# Patient Record
Sex: MALE | Race: WHITE | Employment: OTHER | URBAN - METROPOLITAN AREA
[De-identification: names, ages, dates, MRNs, and addresses within clinical notes are randomized per-mention and may not be internally consistent; named-entity substitution may affect disease eponyms.]

---

## 2017-01-23 RX ORDER — PEN NEEDLE, DIABETIC 31 GX3/16"
1 NEEDLE, DISPOSABLE MISCELLANEOUS DAILY
Qty: 100 PEN NEEDLE | Refills: 3 | Status: SHIPPED | OUTPATIENT
Start: 2017-01-23 | End: 2017-05-18 | Stop reason: SDUPTHER

## 2017-01-24 ENCOUNTER — TELEPHONE (OUTPATIENT)
Dept: ENDOCRINOLOGY | Age: 82
End: 2017-01-24

## 2017-01-24 RX ORDER — WARFARIN SODIUM 4 MG/1
4 TABLET ORAL 2 TIMES DAILY
Qty: 180 TAB | Refills: 0 | Status: SHIPPED | OUTPATIENT
Start: 2017-01-24 | End: 2017-03-01 | Stop reason: SDUPTHER

## 2017-01-24 NOTE — TELEPHONE ENCOUNTER
----- Message from Nayana Workman MD sent at 1/24/2017  4:52 PM EST -----  Regarding: RE: Dr. Claribel Sin / Telephone  Contact: [phone]  This is fine - 90 day supply of branded Coumadin from [facility]. Thank you  ----- Message -----     From: Neel Lozano LPN     Sent: 2/87/8892   4:44 PM       To: Nayana Workman MD  Subject: RE: Dr. Claribel Sin / Telephone                         I called patient back and spoke to him and his wife. Current Coumadin dosing is 4 mg x 2 daily (8 mg total per day). Patient stated it is very important he receive the brand Coumadin and not warfarin. He would really like to fill this through [address] mail order for a 90 day supply, is this ok? Patient states he has been on 8 mg Coumadin for a long time and his INR has been very stable. He will set up appointment with a new PCP as soon as possible.   ----- Message -----     From: Nayana Workman MD     Sent: 1/24/2017   4:27 PM       To: Neel Lozano LPN  Subject: RE: Dr. Claribel Sin / Telephone                         I will be happy to help the [facility] as best as I can, but I have not monitored INR levels or adjusted coumadin in about 10 years, so I would not feel comfortable doing that. I recommend finding a new PCP to help with this. If his INR levels have been stable on current dose of coumadin, then I could send over a 30 day supply with one refill. Please confirm current dosing with them.      ----- Message -----     From: Neel Lozano LPN     Sent: 9/90/2956   3:04 PM       To: Nayana Workman MD  Subject: FW: Dr. Claribel Sin / Telephone                          I returned Mrs. Paiz's call. Patient is changing insurance and his current PCP does not take his new insurance. His PCP has been following his INR every 6-8 weeks and adjusting his coumadin dose accordingly. Patient's wife stated patient would really like for Dr. Claribel Sin to [facility] over\" his coumadin and INR. Currently he is taking coumadin 4 mg x 2 daily.  He is due for an INR on 2/28/16. At the very least, patient would like a one time prescription for coumadin called in to last until he can establish care with a new PCP. Patient's wife states she understands that \"everyone needs a PCP\", but patient wanted her to ask if Dr. Prema Dorsey could take over as much of his care as possible.    ----- Message -----     From: Sandria Moritz     Sent: 1/23/2017   1:59 PM       To: Chai Goff LPN  Subject: FW: Dr. Prema Dorsey / Telephone                         1/23/2017  1:59 PM      I spoke with Mrs. Aura Williamson regarding insurance, please see message below. Thanks  ----- Message -----     From: Lilly Ramos     Sent: 1/23/2017  12:56 PM       To: Tanner Torres [facility]  Subject: Dr. Prema Dorsey / Telephone                             Pt's wife called practice to inform Staff that their insurance has changed as of January 1st.  Pt's wife would like to know if Dr. Prema Dorsey accepts \"[employer] Medicare Advantage PPO\". Please contact Pt. Pt also needs to speak with the nurse regarding his medicine.   Thank you

## 2017-03-01 ENCOUNTER — OFFICE VISIT (OUTPATIENT)
Dept: FAMILY MEDICINE CLINIC | Age: 82
End: 2017-03-01

## 2017-03-01 VITALS
SYSTOLIC BLOOD PRESSURE: 138 MMHG | HEIGHT: 69 IN | DIASTOLIC BLOOD PRESSURE: 68 MMHG | OXYGEN SATURATION: 97 % | WEIGHT: 280 LBS | TEMPERATURE: 96.5 F | HEART RATE: 86 BPM | RESPIRATION RATE: 18 BRPM | BODY MASS INDEX: 41.47 KG/M2

## 2017-03-01 DIAGNOSIS — I10 ESSENTIAL HYPERTENSION: ICD-10-CM

## 2017-03-01 DIAGNOSIS — M17.12 PRIMARY OSTEOARTHRITIS OF LEFT KNEE: ICD-10-CM

## 2017-03-01 DIAGNOSIS — N40.1 BENIGN PROSTATIC HYPERPLASIA WITH LOWER URINARY TRACT SYMPTOMS, UNSPECIFIED MORPHOLOGY: ICD-10-CM

## 2017-03-01 DIAGNOSIS — Z79.01 ANTICOAGULANT LONG-TERM USE: ICD-10-CM

## 2017-03-01 DIAGNOSIS — E11.9 TYPE 2 DIABETES MELLITUS WITHOUT COMPLICATION, WITH LONG-TERM CURRENT USE OF INSULIN (HCC): Primary | ICD-10-CM

## 2017-03-01 DIAGNOSIS — K21.9 GASTROESOPHAGEAL REFLUX DISEASE, ESOPHAGITIS PRESENCE NOT SPECIFIED: ICD-10-CM

## 2017-03-01 DIAGNOSIS — Z86.718 HISTORY OF RECURRENT DEEP VEIN THROMBOSIS (DVT): ICD-10-CM

## 2017-03-01 DIAGNOSIS — Z79.4 TYPE 2 DIABETES MELLITUS WITHOUT COMPLICATION, WITH LONG-TERM CURRENT USE OF INSULIN (HCC): Primary | ICD-10-CM

## 2017-03-01 LAB
INR BLD: 2.4
PT POC: NORMAL SEC
VALID INTERNAL CONTROL?: YES

## 2017-03-01 RX ORDER — TELMISARTAN AND AMLODIPINE 5; 80 MG/1; MG/1
TABLET ORAL
Qty: 90 TAB | Refills: 1 | Status: SHIPPED | OUTPATIENT
Start: 2017-03-01 | End: 2017-06-20 | Stop reason: SDUPTHER

## 2017-03-01 RX ORDER — CHOLECALCIFEROL TAB 125 MCG (5000 UNIT) 125 MCG
1000 TAB ORAL DAILY
COMMUNITY

## 2017-03-01 RX ORDER — WARFARIN SODIUM 4 MG/1
8 TABLET ORAL DAILY
Qty: 180 TAB | Refills: 0 | Status: SHIPPED | OUTPATIENT
Start: 2017-03-01 | End: 2017-06-20 | Stop reason: SDUPTHER

## 2017-03-01 RX ORDER — MELOXICAM 15 MG/1
15 TABLET ORAL
Qty: 30 TAB | Refills: 0 | Status: SHIPPED | OUTPATIENT
Start: 2017-03-01 | End: 2017-03-20 | Stop reason: ALTCHOICE

## 2017-03-01 RX ORDER — ZINC GLUCONATE 50 MG
1000 TABLET ORAL DAILY
COMMUNITY

## 2017-03-01 RX ORDER — FINASTERIDE 5 MG/1
5 TABLET, FILM COATED ORAL DAILY
Qty: 30 TAB | Refills: 5 | Status: SHIPPED | OUTPATIENT
Start: 2017-03-01 | End: 2017-08-20 | Stop reason: SDUPTHER

## 2017-03-01 RX ORDER — CALC/MAG/B COMPLEX/D3/HERB 61
15 TABLET ORAL
Qty: 90 CAP | Refills: 1 | Status: SHIPPED | OUTPATIENT
Start: 2017-03-01 | End: 2017-05-18 | Stop reason: SDUPTHER

## 2017-03-01 RX ORDER — METFORMIN HYDROCHLORIDE 1000 MG/1
1000 TABLET ORAL 2 TIMES DAILY
Qty: 180 TAB | Refills: 1 | Status: SHIPPED | OUTPATIENT
Start: 2017-03-01 | End: 2017-06-20 | Stop reason: SDUPTHER

## 2017-03-01 NOTE — PROGRESS NOTES
Chief Complaint   Patient presents with   [address] patient   Needs refill of pain medication   Regulate Coumadin  Cough since [place]  Room 2

## 2017-03-01 NOTE — MR AVS SNAPSHOT
Visit Information Date & Time Provider Department Dept. Phone Encounter #  
 3/1/2017  3:20 PM Zoe Knox MD [facility] at [address] [id number] Follow-up Instructions Return in about 4 weeks (around 3/29/2017), or if symptoms worsen or fail to improve. Your Appointments 3/15/2017  1:30 PM  
Follow Up with Nellie Rogel MD  
[facility]) Appt Note: f/u              dm                  4 month  
 [address] [facility] [address] [id number] [address]  
  
    
 3/20/2017  2:00 PM  
COMPLETE PHYSICAL with Zoe Knox MD  
[facility] Appt Note: [address] [address]  
[address] [facility] Upcoming Health Maintenance Date Due  
 EYE EXAM RETINAL OR DILATED Q1 8/15/1944 DTaP/Tdap/Td series (1 - Tdap) 8/15/1955 ZOSTER VACCINE AGE 60> 8/15/1994 GLAUCOMA SCREENING Q2Y 8/15/1999 Pneumococcal 65+ Low/Medium Risk (1 of 2 - PCV13) 8/15/1999 MEDICARE YEARLY EXAM 8/15/1999 INFLUENZA AGE 9 TO ADULT 8/1/2016 HEMOGLOBIN A1C Q6M 5/16/2017 FOOT EXAM Q1 7/13/2017 MICROALBUMIN Q1 11/16/2017 LIPID PANEL Q1 11/16/2017 Allergies as of 3/1/2017  Review Complete On: 3/1/2017 By: Zoe Knox MD  
  
 Severity Noted Reaction Type Reactions Statins-hmg-coa Reductase Inhibitors  03/23/2016   Side Effect Myalgia Tried several statins. Current Immunizations  Never Reviewed No immunizations on file. Not reviewed this visit You Were Diagnosed With   
  
 Codes Comments Anticoagulant long-term use    -  Primary ICD-10-CM: Z79.01 
ICD-9-CM: V58.61  History of recurrent deep vein thrombosis (DVT)     ICD-10-CM: E69.453 
 ICD-9-CM: V12.51 Type 2 diabetes mellitus without complication, with long-term current use of insulin ([facility])     ICD-10-CM: E11.9, Z79.4 ICD-9-CM: 250.00, V58.67 Essential hypertension     ICD-10-CM: I10 
ICD-9-CM: 401.9 Gastroesophageal reflux disease, esophagitis presence not specified     ICD-10-CM: K21.9 ICD-9-CM: 530.81 Primary osteoarthritis of left knee     ICD-10-CM: M17.12 
ICD-9-CM: 715.16 Benign prostatic hyperplasia with lower urinary tract symptoms, unspecified morphology     ICD-10-CM: N40.1 ICD-9-CM: 600.01 Vitals BP  
  
  
  
  
  
 138/68 (BP 1 Location: Left arm, BP Patient Position: Sitting) Vitals History BMI and BSA Data Body Mass Index Body Surface Area 41.95 kg/m 2 2.48 m 2 Preferred Pharmacy Pharmacy Name Phone [address], [facility] [phone] Your Updated Medication List  
  
   
This list is accurate as of: 3/1/17  4:34 PM.  Always use your most recent med list.  
  
  
  
  
 COUMADIN 4 mg tablet Generic drug:  warfarin Take 2 Tabs by mouth daily. BRAND MEDICALLY NECESSARY exenatide microspheres 2 mg/0.65 mL Pnij Commonly known as:  BYDUREON  
2 mg by SubCUTAneous route every seven (7) days. For diabetes  
  
 finasteride 5 mg tablet Commonly known as:  PROSCAR Take 1 Tab by mouth daily. Indications: SYMPTOMATIC BENIGN PROSTATIC HYPERPLASIA FLEXERIL 10 mg tablet Generic drug:  cyclobenzaprine Take  by mouth three (3) times daily as needed for Muscle Spasm(s). insulin glargine 100 unit/mL (3 mL) pen Commonly known as:  LANTUS SOLOSTAR  
55 Units by SubCUTAneous route daily. Insulin Needles (Disposable) 31 gauge x 3/16\" Ndle Commonly known as:  BD INSULIN PEN NEEDLE UF MINI  
1 each by SubCUTAneous route daily. Insulin Needles (Disposable) 32 gauge x 5/32\" Ndle Commonly known as:  Linda Pen Needle 1 Each by SubCUTAneous route daily. lansoprazole 15 mg capsule Commonly known as:  PREVACID Take 1 Cap by mouth Daily (before breakfast). Lower dose  
  
 magnesium oxide 400 mg tablet Commonly known as:  MAG-OX Take 1 Tab by mouth four (4) times daily. For low magnesium  
  
 meloxicam 15 mg tablet Commonly known as:  MOBIC Take 1 Tab by mouth daily (after breakfast). Take for 3 days during flares  
  
 metFORMIN 1,000 mg tablet Commonly known as:  GLUCOPHAGE Take 1 Tab by mouth two (2) times a day. Omega-3 Fatty Acids 60- mg Cpdr  
Take  by mouth. Gummies OTHER Take 150 mg by mouth two (2) times a day. Vitacost Benfotiamine-Take 2 tab BID  
  
 quiNINE 324 mg capsule Take 324 mg by mouth nightly as needed. tadalafil 5 mg tablet Commonly known as:  CIALIS  
TAKE 1 TABLET DAILY Telmisartan-amLODIPine 80-5 mg Tab TAKE 1 TABLET NIGHTLY FOR BLOOD PRESSURE  
  
 VITAMIN B-12 1,000 mcg tablet Generic drug:  cyanocobalamin Take 1,000 mcg by mouth daily. Nature Made VITAMIN D3 5,000 unit Tab tablet Generic drug:  cholecalciferol (VITAMIN D3) Take  by mouth daily. ImageTag Made Prescriptions Sent to Pharmacy Refills COUMADIN 4 mg tablet 0 Sig: Take 2 Tabs by mouth daily. BRAND MEDICALLY NECESSARY Class: Normal  
 Pharmacy: [address], [address] Ph #: [phone] Route: Oral  
 Telmisartan-amLODIPine 80-5 mg tab 1 Sig: TAKE 1 TABLET NIGHTLY FOR BLOOD PRESSURE Class: Normal  
 Pharmacy: [address], [address] Ph #: [phone]  
 lansoprazole (PREVACID) 15 mg capsule 1 Sig: Take 1 Cap by mouth Daily (before breakfast). Lower dose Class: Normal  
 Pharmacy: [address], [address] Ph #: [phone]  Route: Oral  
 metFORMIN (GLUCOPHAGE) 1,000 mg tablet 1  
 Sig: Take 1 Tab by mouth two (2) times a day. Class: Normal  
 Pharmacy: [address], [address] Ph #: [phone] Route: Oral  
 meloxicam (MOBIC) 15 mg tablet 0 Sig: Take 1 Tab by mouth daily (after breakfast). Take for 3 days during flares Class: Normal  
 Pharmacy: [address], [address] Ph #: [phone] Route: Oral  
 finasteride (PROSCAR) 5 mg tablet 5 Sig: Take 1 Tab by mouth daily. Indications: SYMPTOMATIC BENIGN PROSTATIC HYPERPLASIA Class: Normal  
 Pharmacy: [address], [address] Ph #: [phone] Route: Oral  
  
We Performed the Following AMB POC PT/INR [64528 CPT(R)] Follow-up Instructions Return in about 4 weeks (around 3/29/2017), or if symptoms worsen or fail to improve. Introducing [facility]! Karin Koenig introduces Zubican patient portal. Now you can access parts of your medical record, email your doctor's office, and request medication refills online. 1. In your internet browser, go to [email or web address] 2. Click on the First Time User? Click Here link in the Sign In box. You will see the New Member Sign Up page. 3. Enter your Zubican Access Code exactly as it appears below. You will not need to use this code after youve completed the sign-up process. If you do not sign up before the expiration date, you must request a new code. · Zubican Access Code: [email or web address] Expires: 5/30/2017  4:34 PM 
 
4. Enter the last four digits of your Social Security Number (xxxx) and Date of Birth (mm/dd/yyyy) as indicated and click Submit. You will be taken to the next sign-up page. 5. Create a Money Dashboardt ID. This will be your Zubican login ID and cannot be changed, so think of one that is secure and easy to remember. 6. Create a Echologics password. You can change your password at any time. 7. Enter your Password Reset Question and Answer. This can be used at a later time if you forget your password. 8. Enter your e-mail address. You will receive e-mail notification when new information is available in [address] 9. Click Sign Up. You can now view and download portions of your medical record. 10. Click the Download Summary menu link to download a portable copy of your medical information. If you have questions, please visit the Frequently Asked Questions section of the Echologics website. Remember, Echologics is NOT to be used for urgent needs. For medical emergencies, dial 911. Now available from your iPhone and Android! Please provide this summary of care documentation to your next provider. Your primary care clinician is listed as Estefany Aceves. If you have any questions after today's visit, please call [phone].

## 2017-03-01 NOTE — PROGRESS NOTES
Subjective:     Chief Complaint   Patient presents with   [address] [facility] patient      He  is a 80 y.o. male who presents for evaluation of:  New pt to est care. Prev seeing Dr. Yris Bañuelos. Pmhx sig for DM2 (seeing Dr. Lou Shine), VTE x 2 on chronic coumadin, GERD, and chronic pain syndrome from back issue many yrs ago. Rarely uses pain meds. Has been out for > 6 months. Reports having cough x 3 months. Slightly productive. Ct to improve. No longer smoking. Quit about 50 yrs ago. Has about a 10 yr pack hx. Exercises with walking, jennifer shooting, gardening. ROS  Gen - no fever/chills  Resp - no dyspnea or cough  CV - no chest pain or DIAZ  Msk - L knee giving way occ, had steroid injection for this in past and seemed to help. Rest per HPI    Past Medical History:   Diagnosis Date    Back injury 1994    Diabetes ([address]     Muscle cramps     Reflux     [facility]romboembol[address] ([facility] [address]      Past Surgical History:   Procedure Laterality Date    HX ORTHOPAEDIC Left     Crushed/left index finger amputee    HX OTHER SURGICAL      hand surgery, heal spur     Current Outpatient Prescriptions on File Prior to Visit   Medication Sig Dispense Refill    Insulin Needles, Disposable, (ALTAGRACIA PEN NEEDLE) 32 gauge x 5/32\" ndle 1 Each by SubCUTAneous route daily. 100 Pen Needle 3    tadalafil (CIALIS) 5 mg tablet TAKE 1 TABLET DAILY 90 Tab 3    exenatide microspheres (BYDUREON) 2 mg/0.65 mL pnij 2 mg by SubCUTAneous route every seven (7) days. For diabetes 8 mL 3    magnesium oxide (MAG-OX) 400 mg tablet Take 1 Tab by mouth four (4) times daily. For low magnesium (Patient taking differently: Take 1,600 mg by mouth daily. For low magnesium) 360 Tab 3    insulin glargine (LANTUS SOLOSTAR) 100 unit/mL (3 mL) pen 55 Units by SubCUTAneous route daily.  (Patient taking differently: 50 Units by SubCUTAneous route daily.) 4 Package 4    Insulin Needles, Disposable, (BD INSULIN PEN NEEDLE UF MINI) 31 x 3/16 \" ndle 1 each by SubCUTAneous route daily. 100 each 3    OTHER Take 150 mg by mouth two (2) times a day. Vitacost Benfotiamine-Take 2 tab BID      cyclobenzaprine (FLEXERIL) 10 mg tablet Take  by mouth three (3) times daily as needed for Muscle Spasm(s).  quiNINE 324 mg capsule Take 324 mg by mouth nightly as needed. No current facility-administered medications on file prior to visit. Objective:     Vitals:    03/01/17 1528   BP: 138/68   Pulse: 86   Resp: 18   Temp: 96.5 °F (35.8 °C)   TempSrc: Oral   SpO2: 97%   Weight: 280 lb (127 kg)   Height: 5' 8.5\" (1.74 m)     Physical Examination:  General appearance - alert, well appearing, and in no distress  Eyes -sclera anicteric  Neck - supple, no significant adenopathy, no thyromegaly, no bruits  Chest - clear to auscultation, no wheezes, rales or rhonchi, symmetric air entry  Heart - normal rate, regular rhythm, normal S1, S2, no murmurs, rubs, clicks or gallops  Neurological - alert, oriented, no focal findings or movement disorder noted  Extr - trace edema    Assessment/ Plan:   Hattie Croft was seen today for establish care. Diagnoses and all orders for this visit:    Type 2 diabetes mellitus without complication, with long-term current use of insulin ([address] - controlled, seeing Dr. Carie Blackburn  -     metFORMIN (GLUCOPHAGE) 1,000 mg tablet; Take 1 Tab by mouth two (2) times a day. Essential hypertension - controlled, meds are affordable  -     Telmisartan-amLODIPine 80-5 mg tab; TAKE 1 TABLET NIGHTLY FOR BLOOD PRESSURE    Anticoagulant long-term use  History of recurrent deep vein thrombosis (DVT)  -     AMB POC PT/INR  -     COUMADIN 4 mg tablet; Take 2 Tabs by mouth daily. BRAND MEDICALLY NECESSARY    Gastroesophageal reflux disease, esophagitis presence not specified - ct current meds as stable  -     lansoprazole (PREVACID) 15 mg capsule; Take 1 Cap by mouth Daily (before breakfast).  Lower dose    Primary osteoarthritis of left knee - will use NSAIDs very sparingly for acute flares, will likely switch to low dose narcotics at next visit. May be able to use steroid injections and possibly compounding cream to help.  -     meloxicam (MOBIC) 15 mg tablet; Take 1 Tab by mouth daily (after breakfast). Take for 3 days during flares    Benign prostatic hyperplasia with lower urinary tract symptoms, unspecified morphology - adding on Proscar. 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 Indications: SYMPTOMATIC BENIGN PROSTATIC HYPERPLASIA    I have discussed the diagnosis with the patient and the intended plan as seen in the above orders. The patient has received an after-visit summary and questions were answered concerning future plans. I have discussed medication side effects and warnings with the patient as well. The patient verbalizes understanding and agreement with the plan. Follow-up Disposition:  Return in about 4 weeks (around 3/29/2017), or if symptoms worsen or fail to improve.

## 2017-03-15 ENCOUNTER — OFFICE VISIT (OUTPATIENT)
Dept: ENDOCRINOLOGY | Age: 82
End: 2017-03-15

## 2017-03-15 VITALS
HEIGHT: 69 IN | DIASTOLIC BLOOD PRESSURE: 78 MMHG | WEIGHT: 278 LBS | SYSTOLIC BLOOD PRESSURE: 174 MMHG | BODY MASS INDEX: 41.18 KG/M2 | HEART RATE: 56 BPM

## 2017-03-15 DIAGNOSIS — Z79.899 CURRENT USE OF PROTON PUMP INHIBITOR: ICD-10-CM

## 2017-03-15 DIAGNOSIS — E83.42 HYPOMAGNESEMIA: ICD-10-CM

## 2017-03-15 DIAGNOSIS — I10 HTN (HYPERTENSION), BENIGN: ICD-10-CM

## 2017-03-15 DIAGNOSIS — E78.5 DYSLIPIDEMIA: ICD-10-CM

## 2017-03-15 DIAGNOSIS — Z79.4 TYPE 2 DIABETES MELLITUS WITHOUT COMPLICATION, WITH LONG-TERM CURRENT USE OF INSULIN (HCC): Primary | ICD-10-CM

## 2017-03-15 DIAGNOSIS — E11.9 TYPE 2 DIABETES MELLITUS WITHOUT COMPLICATION, WITH LONG-TERM CURRENT USE OF INSULIN (HCC): Primary | ICD-10-CM

## 2017-03-15 NOTE — MR AVS SNAPSHOT
Visit Information Date & Time Provider Department Dept. Phone Encounter #  
 3/15/2017  1:30 PM Matthias Workman, [address] [facility] [id number] Follow-up Instructions Return in about 4 months (around 7/15/2017). Your Appointments 3/20/2017  2:00 PM  
COMPLETE PHYSICAL with Salbador Urban MD  
[facility] [address] [facility]) Appt Note: [address] [address]  
[facility] [address] Upcoming Health Maintenance Date Due  
 EYE EXAM RETINAL OR DILATED Q1 8/15/1944 DTaP/Tdap/Td series (1 - Tdap) 8/15/1955 ZOSTER VACCINE AGE 60> 8/15/1994 GLAUCOMA SCREENING Q2Y 8/15/1999 Pneumococcal 65+ Low/Medium Risk (1 of 2 - PCV13) 8/15/1999 MEDICARE YEARLY EXAM 8/15/1999 INFLUENZA AGE 9 TO ADULT 8/1/2016 HEMOGLOBIN A1C Q6M 5/16/2017 FOOT EXAM Q1 7/13/2017 MICROALBUMIN Q1 11/16/2017 LIPID PANEL Q1 11/16/2017 Allergies as of 3/15/2017  Review Complete On: 3/15/2017 By: Matthias Workman MD  
  
 Severity Noted Reaction Type Reactions Statins-hmg-coa Reductase Inhibitors  03/23/2016   Side Effect Myalgia Tried several statins. Current Immunizations  Never Reviewed No immunizations on file. Not reviewed this visit You Were Diagnosed With   
  
 Codes Comments Type 2 diabetes mellitus without complication, with long-term current use of insulin ([facility])    -  Primary ICD-10-CM: E11.9, Z79.4 ICD-9-CM: 250.00, V58.67 HTN (hypertension), benign     ICD-10-CM: I10 
ICD-9-CM: 401.1 Dyslipidemia     ICD-10-CM: E78.5 ICD-9-CM: 272.4 Hypomagnesemia     ICD-10-CM: D81.51 
ICD-9-CM: 275.2 Current use of proton pump inhibitor     ICD-10-CM: N30.676 ICD-9-CM: V58.69 Vitals BP Pulse Height(growth percentile) Weight(growth percentile) BMI Smoking Status 174/78 (!) 56 5' 8.5\" (1.74 m) 278 lb (126.1 kg) 41.65 kg/m2 Former Smoker Vitals History BMI and BSA Data Body Mass Index Body Surface Area  
 41.65 kg/m 2 2.47 m 2 Preferred Pharmacy Pharmacy Name Phone Latrice Lozano [phone] Your Updated Medication List  
  
   
This list is accurate as of: 3/15/17  2:03 PM.  Always use your most recent med list.  
  
  
  
  
 COUMADIN 4 mg tablet Generic drug:  warfarin Take 2 Tabs by mouth daily. BRAND MEDICALLY NECESSARY exenatide microspheres 2 mg/0.65 mL Pnij Commonly known as:  BYDUREON  
2 mg by SubCUTAneous route every seven (7) days. For diabetes  
  
 finasteride 5 mg tablet Commonly known as:  PROSCAR Take 1 Tab by mouth daily. Indications: SYMPTOMATIC BENIGN PROSTATIC HYPERPLASIA FLEXERIL 10 mg tablet Generic drug:  cyclobenzaprine Take  by mouth three (3) times daily as needed for Muscle Spasm(s). insulin glargine 100 unit/mL (3 mL) pen Commonly known as:  LANTUS SOLOSTAR  
55 Units by SubCUTAneous route daily. Insulin Needles (Disposable) 31 gauge x 3/16\" Ndle Commonly known as:  BD INSULIN PEN NEEDLE UF MINI  
1 each by SubCUTAneous route daily. Insulin Needles (Disposable) 32 gauge x 5/32\" Ndle Commonly known as:  Linda Pen Needle 1 Each by SubCUTAneous route daily. lansoprazole 15 mg capsule Commonly known as:  PREVACID Take 1 Cap by mouth Daily (before breakfast). Lower dose  
  
 magnesium oxide 400 mg tablet Commonly known as:  MAG-OX Take 1 Tab by mouth four (4) times daily. For low magnesium  
  
 meloxicam 15 mg tablet Commonly known as:  MOBIC Take 1 Tab by mouth daily (after breakfast). Take for 3 days during flares  
  
 metFORMIN 1,000 mg tablet Commonly known as:  GLUCOPHAGE Take 1 Tab by mouth two (2) times a day.   
  
 Omega-3 Fatty Acids 60- mg Cpdr  
 Take  by mouth. Gummies OTHER Take 150 mg by mouth two (2) times a day. Vitacost Benfotiamine-Take 2 tab BID  
  
 quiNINE 324 mg capsule Take 324 mg by mouth nightly as needed. tadalafil 5 mg tablet Commonly known as:  CIALIS  
TAKE 1 TABLET DAILY Telmisartan-amLODIPine 80-5 mg Tab TAKE 1 TABLET NIGHTLY FOR BLOOD PRESSURE  
  
 VITAMIN B-12 1,000 mcg tablet Generic drug:  cyanocobalamin Take 1,000 mcg by mouth daily. Nature Made VITAMIN D3 5,000 unit Tab tablet Generic drug:  cholecalciferol (VITAMIN D3) Take  by mouth daily. Nature Made We Performed the Following HEMOGLOBIN A1C WITH EAG [72149 CPT(R)]  DIABETES FOOT EXAM [HM7 Custom] MAGNESIUM [id number] CPT(R)] METABOLIC PANEL, BASIC [47898 CPT(R)] VA COLLECTION VENOUS BLOOD,VENIPUNCTURE [id number] CPT(R)] VA HANDLG&/OR CONVEY OF SPEC FOR TR OFFICE TO LAB [30414 CPT(R)] Follow-up Instructions Return in about 4 months (around 7/15/2017). Patient Instructions Diabetes type II. Continue metformin Continue Bydureon 2 mg weekly Continue Latnus 55 units Continue dietary efforts. Blood pressure: 
Continue amlodipine + telmisartan - > take at bedtime We will follow blood presure. May be higher due to cough/cold medications. Low magnesium: 
Will repeat Continue taking 800 mg twice daily Prevacid use 
 
- recommend skipping Wednesdays and Sundays for 2 weeks, then take every other day for 2 weeks, then every 2 day for 2 weeks, then stop. - Can take famotidine 10-20 mg one to two times daily as you are tapering off Prevacid. This is easier to stop. Introducing [address]! Stanton Hoover introduces SiConnect patient portal. Now you can access parts of your medical record, email your doctor's office, and request medication refills online. 1. In your internet browser, go to [email or web address] 2. Click on the First Time User? Click Here link in the Sign In box. You will see the New Member Sign Up page. 3. Enter your [email or web address] Access Code exactly as it appears below. You will not need to use this code after youve completed the sign-up process. If you do not sign up before the expiration date, you must request a new code. · [email or web address] Access Code: [email or web address] Expires: 5/30/2017  5:34 PM 
 
4. Enter the last four digits of your Social Security Number (xxxx) and Date of Birth (mm/dd/yyyy) as indicated and click Submit. You will be taken to the next sign-up page. 5. Create a Kasidie.[email or web address] ID. This will be your [email or web address] login ID and cannot be changed, so think of one that is secure and easy to remember. 6. Create a [email or web address].com password. You can change your password at any time. 7. Enter your Password Reset Question and Answer. This can be used at a later time if you forget your password. 8. Enter your e-mail address. You will receive e-mail notification when new information is available in [address] 9. Click Sign Up. You can now view and download portions of your medical record. 10. Click the Download Summary menu link to download a portable copy of your medical information. If you have questions, please visit the Frequently Asked Questions section of the [email or web address] website. Remember, [email or web address] is NOT to be used for urgent needs. For medical emergencies, dial 911. Now available from your iPhone and Android! Please provide this summary of care documentation to your next provider. Your primary care clinician is listed as Celena Lombardo. If you have any questions after today's visit, please call [phone].

## 2017-03-15 NOTE — PROGRESS NOTES
History of Present Illness: Jose Webb is a 80 y.o. male presents for follow-up of diabetes. He has had diabetes for 20 + years. Diabetes related complications:  Neuropathy: + mild sx of neuropathy. No other known complications. Current diabetes regimen:  Lantus 55 units (50 was too little)  Metformin 1 g BID  Bydureon 2 mg weekly - added 11/2014. Tolerating well. Glucoses:  Forgot meter. Was monitoring on and off.  116 yesterday 122 today. Highest was 140s. Weight: stable to down slightly. Maintained wt loss of about 13 lbs from several years ago. Lipids - tried several statins, had very bothersome muscle cramps. Unwilling to try again    HTN : amlodipine/telmisartan - taking cough/cold medications. Better at other Dr Natalio Price office 2 weeks ago. Hypomagnesemia - 800 mg twice daily. He wonders why this remains low. Prevacid - remains on 15 mg tolerating lower dose. Was not successful tapering off this in past. Willing to try again, especially if he can decrease magnesium supplements. Past Medical History:   Diagnosis Date    Back injury 1994    Diabetes ([facility] [address]     Muscle cramps     Reflux     Thromboembolus (HCC)      Current Outpatient Prescriptions   Medication Sig    cholecalciferol, VITAMIN D3, (VITAMIN D3) 5,000 unit tab tablet Take  by mouth daily. Nature Made    cyanocobalamin (VITAMIN B-12) 1,000 mcg tablet Take 1,000 mcg by mouth daily. Nature Made    COUMADIN 4 mg tablet Take 2 Tabs by mouth daily. BRAND MEDICALLY NECESSARY    Telmisartan-amLODIPine 80-5 mg tab TAKE 1 TABLET NIGHTLY FOR BLOOD PRESSURE    lansoprazole (PREVACID) 15 mg capsule Take 1 Cap by mouth Daily (before breakfast). Lower dose    metFORMIN (GLUCOPHAGE) 1,000 mg tablet Take 1 Tab by mouth two (2) times a day.  Insulin Needles, Disposable, (ALTAGRACIA PEN NEEDLE) 32 gauge x 5/32\" ndle 1 Each by SubCUTAneous route daily.     tadalafil (CIALIS) 5 mg tablet TAKE 1 TABLET DAILY    exenatide microspheres (BYDUREON) 2 mg/0.65 mL pnij 2 mg by SubCUTAneous route every seven (7) days. For diabetes    magnesium oxide (MAG-OX) 400 mg tablet Take 1 Tab by mouth four (4) times daily. For low magnesium (Patient taking differently: Take 1,600 mg by mouth daily. For low magnesium)    insulin glargine (LANTUS SOLOSTAR) 100 unit/mL (3 mL) pen 55 Units by SubCUTAneous route daily.  Insulin Needles, Disposable, (BD INSULIN PEN NEEDLE UF MINI) 31 x 3/16 \" ndle 1 each by SubCUTAneous route daily.  OTHER Take 150 mg by mouth two (2) times a day. Vitacost Benfotiamine-Take 2 tab BID    cyclobenzaprine (FLEXERIL) 10 mg tablet Take  by mouth three (3) times daily as needed for Muscle Spasm(s).  quiNINE 324 mg capsule Take 324 mg by mouth nightly as needed.  Omega-3 Fatty Acids 60- mg cpDR Take  by mouth. Gummies    meloxicam (MOBIC) 15 mg tablet Take 1 Tab by mouth daily (after breakfast). Take for 3 days during flares    finasteride (PROSCAR) 5 mg tablet Take 1 Tab by mouth daily. Indications: SYMPTOMATIC BENIGN PROSTATIC HYPERPLASIA     No current facility-administered medications for this visit. Allergies   Allergen Reactions    Statins-Hmg-Coa Reductase Inhibitors Myalgia     Tried several statins. Review of Systems:  - Eyes: no blurry vision or double vision  - Cardiovascular: no chest pain  - Respiratory: no shortness of breath  - Musculoskeletal: has trouble bending down toes on right foot.    - Neurological: no numbness/tingling in extremities    Physical Examination:  Visit Vitals    /78    Pulse (!) 56    Ht 5' 8.5\" (1.74 m)    Wt 278 lb (126.1 kg)    BMI 41.65 kg/m2   -   - General: pleasant, no distress, normal gait   HEENT: hearing intact, EOMI, clear sclera without icterus  - Cardiovascular: regular, normal rate   - Respiratory: normal effort  - Integumentary: no edema   Diabetic foot exam:     Left: RFilament test normal sensation with micro filament   Pulse DP: 2+ (normal)   Deformities: None    - Psychiatric: normal mood and affect    Data Reviewed:   Component      Latest Ref Rng & Units 11/16/2016 11/16/2016 11/16/2016 11/16/2016           2:12 PM  2:12 PM  2:12 PM  2:12 PM   Glucose      65 - 99 mg/dL       BUN      8 - 27 mg/dL       Creatinine      0.76 - 1.27 mg/dL       GFR est non-AA      >59 mL/min/1.73       GFR est AA      >59 mL/min/1.73       BUN/Creatinine ratio      10 - 22       Sodium      136 - 144 mmol/L       Potassium      3.5 - 5.2 mmol/L       Chloride      97 - 106 mmol/L       CO2      18 - 29 mmol/L       Calcium      8.6 - 10.2 mg/dL       Protein, total      6.0 - 8.5 g/dL       Albumin      3.5 - 4.7 g/dL       GLOBULIN, TOTAL      1.5 - 4.5 g/dL       A-G Ratio      1.1 - 2.5       Bilirubin, total      0.0 - 1.2 mg/dL       Alk. phosphatase      39 - 117 IU/L       AST      0 - 40 IU/L       ALT      0 - 44 IU/L       Cholesterol, total      100 - 199 mg/dL    198   Triglyceride      0 - 149 mg/dL    152 (H)   HDL Cholesterol      >39 mg/dL    43   VLDL, calculated      5 - 40 mg/dL    30   LDL, calculated      0 - 99 mg/dL    125 (H)   Creatinine, urine      Not Estab. mg/dL  70.5     Microalbumin, urine      Not Estab. ug/mL  21.9     Microalbumin/Creat.  Ratio      0.0 - 30.0 mg/g creat  31.1 (H)     Hemoglobin A1c, (calculated)      4.8 - 5.6 %   6.1 (H)    Estimated average glucose      mg/dL   128    TSH      0.450 - 4.500 uIU/mL       T4, Free      0.82 - 1.77 ng/dL       Magnesium      1.6 - 2.3 mg/dL 1.5 (L)        Component      Latest Ref Rng & Units 11/16/2016 11/16/2016 11/16/2016           2:12 PM  2:12 PM  2:12 PM   Glucose      65 - 99 mg/dL 107 (H)     BUN      8 - 27 mg/dL 12     Creatinine      0.76 - 1.27 mg/dL 1.11     GFR est non-AA      >59 mL/min/1.73 62     GFR est AA      >59 mL/min/1.73 71     BUN/Creatinine ratio      10 - 22 11     Sodium      136 - 144 mmol/L 137     Potassium      3.5 - 5.2 mmol/L 4.5 Chloride      97 - 106 mmol/L 99     CO2      18 - 29 mmol/L 23     Calcium      8.6 - 10.2 mg/dL 9.4     Protein, total      6.0 - 8.5 g/dL 7.0     Albumin      3.5 - 4.7 g/dL 4.2     GLOBULIN, TOTAL      1.5 - 4.5 g/dL 2.8     A-G Ratio      1.1 - 2.5 1.5     Bilirubin, total      0.0 - 1.2 mg/dL 0.6     Alk. phosphatase      39 - 117 IU/L 66     AST      0 - 40 IU/L 39     ALT      0 - 44 IU/L 17     Cholesterol, total      100 - 199 mg/dL      Triglyceride      0 - 149 mg/dL      HDL Cholesterol      >39 mg/dL      VLDL, calculated      5 - 40 mg/dL      LDL, calculated      0 - 99 mg/dL      Creatinine, urine      Not Estab. mg/dL      Microalbumin, urine      Not Estab. ug/mL      Microalbumin/Creat. Ratio      0.0 - 30.0 mg/g creat      Hemoglobin A1c, (calculated)      4.8 - 5.6 %      Estimated average glucose      mg/dL      TSH      0.450 - 4.500 uIU/mL   3.400   T4, Free      0.82 - 1.77 ng/dL  1.24    Magnesium      1.6 - 2.3 mg/dL          Assessment/Plan:   1. Type 2 diabetes mellitus without complication, with long-term current use of insulin (HCC)   - glucoses sound controlled  - check A1c  Continue lantus 55 units, Bydureon, metformin. 2. HTN (hypertension), benign   - higher today. May be due to OTC cough and cold medications  - will follow. - continue amlodipine/benazepril   3. Dyslipidemia   - intolerant of statins   4. Hypomagnesemia   - suspect due to PPI  - continue mg oxide 800 mg BID. -repea   5. Current use of proton pump inhibitor   - directions given to taper off Prevacid over 6 weeks. Recommend he take famotidine while tapering if sx arise     Patient Instructions   Diabetes type II. Continue metformin   Continue Bydureon 2 mg weekly  Continue Latnus 55 units     Continue dietary efforts. Blood pressure:  Continue amlodipine + telmisartan - > take at bedtime  We will follow blood presure. May be higher due to cough/cold medications.       Low magnesium:  Will repeat  Continue taking 800 mg twice daily    Prevacid use    - recommend skipping Wednesdays and Sundays for 2 weeks, then take every other day for 2 weeks, then every 2 day for 2 weeks, then stop. - Can take famotidine 10-20 mg one to two times daily as you are tapering off Prevacid. This is easier to stop. Follow-up Disposition:  Return in about 4 months (around 7/15/2017).     Copy sent to:

## 2017-03-15 NOTE — PATIENT INSTRUCTIONS
Diabetes type II. Continue metformin   Continue Bydureon 2 mg weekly  Continue Latnus 55 units     Continue dietary efforts. Blood pressure:  Continue amlodipine + telmisartan - > take at bedtime  We will follow blood presure. May be higher due to cough/cold medications. Low magnesium:  Will repeat  Continue taking 800 mg twice daily    Prevacid use    - recommend skipping Wednesdays and Sundays for 2 weeks, then take every other day for 2 weeks, then every 2 day for 2 weeks, then stop. - Can take famotidine 10-20 mg one to two times daily as you are tapering off Prevacid. This is easier to stop.

## 2017-03-16 LAB
BUN SERPL-MCNC: 11 MG/DL (ref 8–27)
BUN/CREAT SERPL: 13 (ref 10–22)
CALCIUM SERPL-MCNC: 9.5 MG/DL (ref 8.6–10.2)
CHLORIDE SERPL-SCNC: 100 MMOL/L (ref 96–106)
CO2 SERPL-SCNC: 24 MMOL/L (ref 18–29)
CREAT SERPL-MCNC: 0.87 MG/DL (ref 0.76–1.27)
EST. AVERAGE GLUCOSE BLD GHB EST-MCNC: 131 MG/DL
GLUCOSE SERPL-MCNC: 73 MG/DL (ref 65–99)
HBA1C MFR BLD: 6.2 % (ref 4.8–5.6)
MAGNESIUM SERPL-MCNC: 1.2 MG/DL (ref 1.6–2.3)
POTASSIUM SERPL-SCNC: 5 MMOL/L (ref 3.5–5.2)
SODIUM SERPL-SCNC: 139 MMOL/L (ref 134–144)

## 2017-03-20 ENCOUNTER — OFFICE VISIT (OUTPATIENT)
Dept: FAMILY MEDICINE CLINIC | Age: 82
End: 2017-03-20

## 2017-03-20 VITALS
RESPIRATION RATE: 18 BRPM | HEIGHT: 69 IN | DIASTOLIC BLOOD PRESSURE: 68 MMHG | OXYGEN SATURATION: 98 % | BODY MASS INDEX: 41.12 KG/M2 | HEART RATE: 78 BPM | TEMPERATURE: 98 F | SYSTOLIC BLOOD PRESSURE: 139 MMHG | WEIGHT: 277.6 LBS

## 2017-03-20 DIAGNOSIS — K21.9 GASTROESOPHAGEAL REFLUX DISEASE, ESOPHAGITIS PRESENCE NOT SPECIFIED: ICD-10-CM

## 2017-03-20 DIAGNOSIS — Z13.39 SCREENING FOR ALCOHOLISM: ICD-10-CM

## 2017-03-20 DIAGNOSIS — Z51.81 ENCOUNTER FOR MONITORING COUMADIN THERAPY: ICD-10-CM

## 2017-03-20 DIAGNOSIS — Z00.00 ROUTINE GENERAL MEDICAL EXAMINATION AT A HEALTH CARE FACILITY: Primary | ICD-10-CM

## 2017-03-20 DIAGNOSIS — Z86.718 HISTORY OF VENOUS THROMBOEMBOLISM: ICD-10-CM

## 2017-03-20 DIAGNOSIS — R05.3 CHRONIC COUGH: ICD-10-CM

## 2017-03-20 DIAGNOSIS — Z79.01 ENCOUNTER FOR MONITORING COUMADIN THERAPY: ICD-10-CM

## 2017-03-20 DIAGNOSIS — L82.1 SEBORRHEIC KERATOSES: ICD-10-CM

## 2017-03-20 LAB
INR BLD: 2.1
PT POC: NORMAL SEC
VALID INTERNAL CONTROL?: YES

## 2017-03-20 RX ORDER — AZITHROMYCIN 250 MG/1
TABLET, FILM COATED ORAL
Qty: 6 TAB | Refills: 0 | Status: SHIPPED | OUTPATIENT
Start: 2017-03-20 | End: 2017-03-25

## 2017-03-20 NOTE — PROGRESS NOTES
This is a Subsequent Medicare Annual Wellness Visit providing Personalized Prevention Plan Services (PPPS) (Performed 12 months after initial AWV and PPPS )    I have reviewed the patient's medical history in detail and updated the computerized patient record. History     Pmhx sig for DM2 (seeing Dr. 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 Rarely uses pain meds. Has been out for > 6 months. Chronically on Coumadin and well controled with INR today of 2.1    Recently saw Dr. Sumit Hightower and DM well controlled on Lantus, Metformin, and Bydureon. Of note, he has ct to have issues with hypomag thought to be partly related to PPI use and is being tapered off this to an H2 blocker. Reports cough x > 3 months that seemed to start after a URI. Never has had issues with asthma or allergies in past.  Feels like he gets a cough like this every year. No f/c, dyspnea, or wheezing recently. In past years, he has done well with abx for this issue. Declines inhalers/ICS today. Doing well today with no concerns. Past Medical History:   Diagnosis Date    Back injury 1994    Diabetes ([address]     Muscle cramps     Reflux     Thromboembolus (HCC)       Past Surgical History:   Procedure Laterality Date    HX ORTHOPAEDIC Left     Crushed/left index finger amputee    HX OTHER SURGICAL      hand surgery, heal spur     Current Outpatient Prescriptions   Medication Sig Dispense Refill    azithromycin (ZITHROMAX) 250 mg tablet Take 2 tablets today, then take 1 tablet daily 6 Tab 0    cholecalciferol, VITAMIN D3, (VITAMIN D3) 5,000 unit tab tablet Take  by mouth daily. Nature Made      cyanocobalamin (VITAMIN B-12) 1,000 mcg tablet Take 1,000 mcg by mouth daily. Nature Made      COUMADIN 4 mg tablet Take 2 Tabs by mouth daily.  BRAND MEDICALLY NECESSARY 180 Tab 0    Telmisartan-amLODIPine 80-5 mg tab TAKE 1 TABLET NIGHTLY FOR BLOOD PRESSURE 90 Tab 1    lansoprazole (PREVACID) 15 mg capsule Take 1 Cap by mouth Daily (before breakfast). Lower dose (Patient taking differently: Take 15 mg by mouth Daily (before breakfast). Lower dose  Indications: Skip Wednesday and Friday) 90 Cap 1    metFORMIN (GLUCOPHAGE) 1,000 mg tablet Take 1 Tab by mouth two (2) times a day. 180 Tab 1    finasteride (PROSCAR) 5 mg tablet Take 1 Tab by mouth daily. Indications: SYMPTOMATIC BENIGN PROSTATIC HYPERPLASIA 30 Tab 5    Insulin Needles, Disposable, (ALTAGRACIA PEN NEEDLE) 32 gauge x 5/32\" ndle 1 Each by SubCUTAneous route daily. 100 Pen Needle 3    tadalafil (CIALIS) 5 mg tablet TAKE 1 TABLET DAILY 90 Tab 3    exenatide microspheres (BYDUREON) 2 mg/0.65 mL pnij 2 mg by SubCUTAneous route every seven (7) days. For diabetes 8 mL 3    magnesium oxide (MAG-OX) 400 mg tablet Take 1 Tab by mouth four (4) times daily. For low magnesium (Patient taking differently: Take 1,600 mg by mouth daily. For low magnesium) 360 Tab 3    insulin glargine (LANTUS SOLOSTAR) 100 unit/mL (3 mL) pen 55 Units by SubCUTAneous route daily. 4 Package 4    Insulin Needles, Disposable, (BD INSULIN PEN NEEDLE UF MINI) 31 x 3/16 \" ndle 1 each by SubCUTAneous route daily. 100 each 3    OTHER Take 150 mg by mouth two (2) times a day. Vitacost Benfotiamine-Take 2 tab BID      cyclobenzaprine (FLEXERIL) 10 mg tablet Take  by mouth three (3) times daily as needed for Muscle Spasm(s).  quiNINE 324 mg capsule Take 324 mg by mouth nightly as needed. Allergies   Allergen Reactions    Statins-Hmg-Coa Reductase Inhibitors Myalgia     Tried several statins.       Family History   Problem Relation Age of Onset    Heart Disease Brother     Bleeding Prob Father     Tuberculosis Sister      Social History   Substance Use Topics    Smoking status: Former Smoker     Quit date: 1/1/1975    Smokeless tobacco: Never Used    Alcohol use Yes      Comment: once in a while     Patient Active Problem List   Diagnosis Code  Diabetes mellitus, type II ([facility] [address] E11.9    Gastroesophageal reflux disease K21.9    History of venous thromboembolism Z86.718    Encounter for monitoring coumadin therapy Z51.81, Z79.01       Depression Risk Factor Screening:     PHQ 2 / 9, over the last two weeks 3/20/2017   Little interest or pleasure in doing things Not at all   Feeling down, depressed or hopeless Not at all   Total Score PHQ 2 0     Alcohol Risk Factor Screening: On any occasion during the past 3 months, have you had more than 4 drinks containing alcohol? No    Do you average more than 14 drinks per week? No      Functional Ability and Level of Safety:     Hearing Loss   normal-to-mild    Activities of Daily Living   Self-care. Requires assistance with: no ADLs    Fall Risk     Fall Risk Assessment, last 12 mths 3/20/2017   Able to walk? Yes   Fall in past 12 months? No     Abuse Screen   Patient is not abused    Review of Systems   A comprehensive review of systems was negative except for that written in the HPI.     Physical Examination     Evaluation of Cognitive Function:  Mood/affect:  neutral  Appearance: age appropriate  Family member/caregiver input: None    Physical Examination:  General appearance - alert, well appearing, and in no distress  Eyes -sclera anicteric  Neck - supple, no significant adenopathy, no thyromegaly, no bruits  Chest - clear to auscultation, no wheezes, rales or rhonchi, symmetric air entry  Heart - normal rate, regular rhythm, normal S1, S2, no murmurs, rubs, clicks or gallops  Neurological - alert, oriented, no focal findings or movement disorder noted  Extr - trace edema  Skin - SK no upper back          Patient Care Team:  Gianni Zapata MD as PCP - General (Family Practice)  Geri Ruano MD (Ophthalmology)  Krystal Knox MD as Consulting Provider (Endocrinology)  Jama Tinsley MD (Orthopedic Surgery)  Rain Pierre MD (Otolaryngology)    Advice/Referrals/Counseling   Education and counseling provided:  Are appropriate based on today's review and evaluation    Assessment/Plan       ICD-10-CM ICD-9-CM    1. Routine general medical examination at a health care facility Z00.00 V70.0    2. Screening for alcoholism Z13.89 V79.1    3. Chronic cough  - CXR to r/o masses  - will cover for atypical lilly with Z rolanda  - pt decline KERON or ICS R05 786.2 XR CHEST PA LAT      azithromycin (ZITHROMAX) 250 mg tablet   4. Seborrheic keratoses L82.1 702.19    5. Encounter for monitoring coumadin therapy Z51.81 V58.83 AMB POC PT/INR    Z79.01 V58.61    6. History of venous thromboembolism Z86.718 V12.51    7.  Gastroesophageal reflux disease, esophagitis presence not specified K21.9 530.81

## 2017-03-20 NOTE — PROGRESS NOTES
Chief Complaint   Patient presents with   [employer] Annual Wellness Visit     Medicare   Discuss Coumadin check every 6 weeks  Room 2

## 2017-03-20 NOTE — MR AVS SNAPSHOT
Visit Information Date & Time Provider Department Dept. Phone Encounter #  
 3/20/2017  2:00 PM Sherly Gómez MD [facility] at [address] [id number] Follow-up Instructions Return in about 3 months (around 6/20/2017), or if symptoms worsen or fail to improve. Your Appointments 7/12/2017 11:30 AM  
Follow Up with MD Everardo Morin Diabetes and Endocrinology [facility] Appt Note: f/u       dm        4 month  
 [address] [facility] [address] [phone] [address] [facility] Upcoming Health Maintenance Date Due  
 EYE EXAM RETINAL OR DILATED Q1 8/15/1944 DTaP/Tdap/Td series (1 - Tdap) 8/15/1955 ZOSTER VACCINE AGE 60> 8/15/1994 GLAUCOMA SCREENING Q2Y 8/15/1999 Pneumococcal 65+ Low/Medium Risk (1 of 2 - PCV13) 8/15/1999 MEDICARE YEARLY EXAM 8/15/1999 INFLUENZA AGE 9 TO ADULT 8/1/2016 HEMOGLOBIN A1C Q6M 9/15/2017 MICROALBUMIN Q1 11/16/2017 LIPID PANEL Q1 11/16/2017 FOOT EXAM Q1 3/15/2018 Allergies as of 3/20/2017  Review Complete On: 3/20/2017 By: Sherly Gómez MD  
  
 Severity Noted Reaction Type Reactions Statins-hmg-coa Reductase Inhibitors  03/23/2016   Side Effect Myalgia Tried several statins. Current Immunizations  Never Reviewed No immunizations on file. Not reviewed this visit You Were Diagnosed With   
  
 Codes Comments Routine general medical examination at a health care facility    -  Primary ICD-10-CM: Z00.00 ICD-9-CM: V70.0 Screening for alcoholism     ICD-10-CM: Z13.89 ICD-9-CM: V79.1 Chronic cough     ICD-10-CM: R05 ICD-9-CM: 445. 2 Vitals BP Pulse Temp Resp Height(growth percentile) Weight(growth percentile) 139/68 (BP 1 Location: Left arm, BP Patient Position: Sitting) 78 98 °F (36.7 °C) (Oral) 18 5' 8.5\" (1.74 m) 277 lb 9.6 oz (125.9 kg) SpO2 BMI Smoking Status 98% 41.59 kg/m2 Former Smoker Vitals History BMI and BSA Data Body Mass Index Body Surface Area 41.59 kg/m 2 2.47 m 2 Preferred Pharmacy Pharmacy Name Phone [id number] [address] [phone] Your Updated Medication List  
  
   
This list is accurate as of: 3/20/17  3:04 PM.  Always use your most recent med list.  
  
  
  
  
 azithromycin 250 mg tablet Commonly known as:  Ashley Felipe Take 2 tablets today, then take 1 tablet daily COUMADIN 4 mg tablet Generic drug:  warfarin Take 2 Tabs by mouth daily. BRAND MEDICALLY NECESSARY exenatide microspheres 2 mg/0.65 mL Pnij Commonly known as:  BYDUREON  
2 mg by SubCUTAneous route every seven (7) days. For diabetes  
  
 finasteride 5 mg tablet Commonly known as:  PROSCAR Take 1 Tab by mouth daily. Indications: SYMPTOMATIC BENIGN PROSTATIC HYPERPLASIA FLEXERIL 10 mg tablet Generic drug:  cyclobenzaprine Take  by mouth three (3) times daily as needed for Muscle Spasm(s). insulin glargine 100 unit/mL (3 mL) pen Commonly known as:  LANTUS SOLOSTAR  
55 Units by SubCUTAneous route daily. Insulin Needles (Disposable) 31 gauge x 3/16\" Ndle Commonly known as:  BD INSULIN PEN NEEDLE UF MINI  
1 each by SubCUTAneous route daily. Insulin Needles (Disposable) 32 gauge x 5/32\" Ndle Commonly known as:  Linda Pen Needle 1 Each by SubCUTAneous route daily. lansoprazole 15 mg capsule Commonly known as:  PREVACID Take 1 Cap by mouth Daily (before breakfast). Lower dose  
  
 magnesium oxide 400 mg tablet Commonly known as:  MAG-OX Take 1 Tab by mouth four (4) times daily. For low magnesium  
  
 metFORMIN 1,000 mg tablet Commonly known as:  GLUCOPHAGE Take 1 Tab by mouth two (2) times a day. OTHER Take 150 mg by mouth two (2) times a day.  Vitacost Benfotiamine-Take 2 tab BID  
 quiNINE 324 mg capsule Take 324 mg by mouth nightly as needed. tadalafil 5 mg tablet Commonly known as:  CIALIS  
TAKE 1 TABLET DAILY Telmisartan-amLODIPine 80-5 mg Tab TAKE 1 TABLET NIGHTLY FOR BLOOD PRESSURE  
  
 VITAMIN B-12 1,000 mcg tablet Generic drug:  cyanocobalamin Take 1,000 mcg by mouth daily. Nature Made VITAMIN D3 5,000 unit Tab tablet Generic drug:  cholecalciferol (VITAMIN D3) Take  by mouth daily. Nature Made Prescriptions Sent to Pharmacy Refills  
 azithromycin (ZITHROMAX) 250 mg tablet 0 Sig: Take 2 tablets today, then take 1 tablet daily Class: Normal  
 Pharmacy: [facility] [address] #: [phone] Follow-up Instructions Return in about 3 months (around 6/20/2017), or if symptoms worsen or fail to improve. To-Do List   
 03/20/2017 Imaging:  XR CHEST PA LAT Introducing \[facility]\"" & HEALTH SERVICES! [facility] introduces [employer] patient portal. Now you can access parts of your medical record, email your doctor's office, and request medication refills online. 1. In your internet browser, go to [email or web address] 2. Click on the First Time User? Click Here link in the Sign In box. You will see the New Member Sign Up page. 3. Enter your Specific [employer] Access Code exactly as it appears below. You will not need to use this code after youve completed the sign-up process. If you do not sign up before the expiration date, you must request a new code. · Specific [employer] Access Code: [email or web address] Expires: 5/30/2017  5:34 PM 
 
4. Enter the last four digits of your Social Security Number (xxxx) and Date of Birth (mm/dd/yyyy) as indicated and click Submit. You will be taken to the next sign-up page. 5. Create a [employer] ID. This will be your [employer] login ID and cannot be changed, so think of one that is secure and easy to remember. 6. Create a [employer] password. You can change your password at any time. 7. Enter your Password Reset Question and Answer. This can be used at a later time if you forget your password. 8. Enter your e-mail address. You will receive e-mail notification when new information is available in [address] 9. Click Sign Up. You can now view and download portions of your medical record. 10. Click the Download Summary menu link to download a portable copy of your medical information. If you have questions, please visit the Frequently Asked Questions section of the [employer] website. Remember, [employer] is NOT to be used for urgent needs. For medical emergencies, dial 911. Now available from your iPhone and Android! Please provide this summary of care documentation to your next provider. Your primary care clinician is listed as Jeannie Gonzalez. If you have any questions after today's visit, please call [phone].

## 2017-03-21 NOTE — ACP (ADVANCE CARE PLANNING)
Advance Care Planning    Advance Care Planning (ACP) Provider Conversation Snapshot    Date of ACP Conversation: 03/20/17  Persons included in Conversation:  patient  Length of ACP Conversation in minutes:  <16 minutes (Non-Billable)    Authorized Decision Maker (if patient is incapable of making informed decisions): This person is:   Healthcare Agent/Medical Power of  under Advance Directive          For Patients with Decision Making Capacity:   Values/Goals: Exploration of values, goals, and preferences if recovery is not expected, even with continued medical treatment in the event of:  Imminent death  Severe, permanent brain injury    Conversation Outcomes / Follow-Up Plan:   Recommended completion of Advance Directive form after review of ACP materials and conversation with prospective healthcare agent      Reviewed ACP info.

## 2017-04-05 ENCOUNTER — HOSPITAL ENCOUNTER (OUTPATIENT)
Dept: GENERAL RADIOLOGY | Age: 82
Discharge: HOME | End: 2017-04-05
Payer: MEDICARE

## 2017-04-05 DIAGNOSIS — R05.3 CHRONIC COUGH: ICD-10-CM

## 2017-04-05 PROCEDURE — 71020 XR CHEST PA LAT: CPT

## 2017-04-20 ENCOUNTER — OFFICE VISIT (OUTPATIENT)
Dept: FAMILY MEDICINE CLINIC | Age: 82
End: 2017-04-20

## 2017-04-20 VITALS
OXYGEN SATURATION: 96 % | HEART RATE: 82 BPM | RESPIRATION RATE: 18 BRPM | SYSTOLIC BLOOD PRESSURE: 133 MMHG | HEIGHT: 69 IN | BODY MASS INDEX: 41.56 KG/M2 | DIASTOLIC BLOOD PRESSURE: 69 MMHG | WEIGHT: 280.6 LBS | TEMPERATURE: 96.8 F

## 2017-04-20 DIAGNOSIS — D68.59 HYPERCOAGULABLE STATE (HCC): Primary | ICD-10-CM

## 2017-04-20 DIAGNOSIS — K21.9 GASTROESOPHAGEAL REFLUX DISEASE, ESOPHAGITIS PRESENCE NOT SPECIFIED: ICD-10-CM

## 2017-04-20 DIAGNOSIS — Z51.81 ENCOUNTER FOR MONITORING COUMADIN THERAPY: ICD-10-CM

## 2017-04-20 DIAGNOSIS — Z79.01 ENCOUNTER FOR MONITORING COUMADIN THERAPY: ICD-10-CM

## 2017-04-20 DIAGNOSIS — Z86.718 HISTORY OF VENOUS THROMBOEMBOLISM: ICD-10-CM

## 2017-04-20 LAB
INR BLD: 2.2
PT POC: NORMAL SEC
VALID INTERNAL CONTROL?: YES

## 2017-04-20 RX ORDER — MOMETASONE FUROATE 1 MG/G
OINTMENT TOPICAL DAILY
COMMUNITY
End: 2017-05-18 | Stop reason: ALTCHOICE

## 2017-04-20 NOTE — MR AVS SNAPSHOT
Visit Information Date & Time Provider Department Dept. Phone Encounter #  
 4/20/2017 10:10 AM Cecilia Delatorre MD [address] at [address] [place] [address] [id number] Follow-up Instructions Return in about 5 weeks (around 5/25/2017), or if symptoms worsen or fail to improve. Your Appointments 6/20/2017  9:50 AM  
ROUTINE CARE with Cecilia Delatorre MD  
[address] at [facility] [address]) Appt Note: 3mo fu  
 [facility] [address] [address]  
[facility]  
  
    
 7/12/2017 11:30 AM  
Follow Up with Maribel Milton MD  
[facility] [address]) Appt Note: f/u       dm        4 month  
 [address] [facility] [address] [id number] [address] [facility] Upcoming Health Maintenance Date Due  
 EYE EXAM RETINAL OR DILATED Q1 8/15/1944 DTaP/Tdap/Td series (1 - Tdap) 8/15/1955 ZOSTER VACCINE AGE 60> 8/15/1994 GLAUCOMA SCREENING Q2Y 8/15/1999 Pneumococcal 65+ Low/Medium Risk (1 of 2 - PCV13) 8/15/1999 INFLUENZA AGE 9 TO ADULT 8/1/2016 HEMOGLOBIN A1C Q6M 9/15/2017 MICROALBUMIN Q1 11/16/2017 LIPID PANEL Q1 11/16/2017 FOOT EXAM Q1 3/15/2018 MEDICARE YEARLY EXAM 3/21/2018 Allergies as of 4/20/2017  Review Complete On: 4/20/2017 By: Cecilia Delatorre MD  
  
 Severity Noted Reaction Type Reactions Statins-hmg-coa Reductase Inhibitors  03/23/2016   Side Effect Myalgia Tried several statins. Current Immunizations  Never Reviewed No immunizations on file. Not reviewed this visit You Were Diagnosed With   
  
 Codes Comments Hypercoagulable state ([facility]    -  Primary ICD-10-CM: V16.74 
ICD-9-CM: 289.81 History of venous thromboembolism     ICD-10-CM: Z86.718 ICD-9-CM: V12.51   
 Encounter for monitoring coumadin therapy     ICD-10-CM: Z51.81, Z79.01 
ICD-9-CM: V58.83, V58.61 Gastroesophageal reflux disease, esophagitis presence not specified     ICD-10-CM: K21.9 ICD-9-CM: 530.81 Vitals BP Pulse Temp Resp Height(growth percentile) Weight(growth percentile) 133/69 (BP 1 Location: Left arm, BP Patient Position: Sitting) 82 96.8 °F (36 °C) (Oral) 18 5' 8.5\" (1.74 m) 280 lb 9.6 oz (127.3 kg) SpO2 BMI Smoking Status 96% 42.04 kg/m2 Former Smoker Vitals History BMI and BSA Data Body Mass Index Body Surface Area 42.04 kg/m 2 2.48 m 2 Preferred Pharmacy Pharmacy Name Phone [id number] [address] [phone] Your Updated Medication List  
  
   
This list is accurate as of: 4/20/17 10:50 AM.  Always use your most recent med list.  
  
  
  
  
 COUMADIN 4 mg tablet Generic drug:  warfarin Take 2 Tabs by mouth daily. BRAND MEDICALLY NECESSARY exenatide microspheres 2 mg/0.65 mL Pnij Commonly known as:  BYDUREON  
2 mg by SubCUTAneous route every seven (7) days. For diabetes  
  
 finasteride 5 mg tablet Commonly known as:  PROSCAR Take 1 Tab by mouth daily. Indications: SYMPTOMATIC BENIGN PROSTATIC HYPERPLASIA FLEXERIL 10 mg tablet Generic drug:  cyclobenzaprine Take  by mouth three (3) times daily as needed for Muscle Spasm(s). insulin glargine 100 unit/mL (3 mL) pen Commonly known as:  LANTUS SOLOSTAR  
55 Units by SubCUTAneous route daily. Insulin Needles (Disposable) 31 gauge x 3/16\" Ndle Commonly known as:  BD INSULIN PEN NEEDLE UF MINI  
1 each by SubCUTAneous route daily. Insulin Needles (Disposable) 32 gauge x 5/32\" Ndle Commonly known as:  Linda Pen Needle 1 Each by SubCUTAneous route daily. lansoprazole 15 mg capsule Commonly known as:  PREVACID Take 1 Cap by mouth Daily (before breakfast). Lower dose magnesium oxide 400 mg tablet Commonly known as:  MAG-OX Take 1 Tab by mouth four (4) times daily. For low magnesium  
  
 metFORMIN 1,000 mg tablet Commonly known as:  GLUCOPHAGE Take 1 Tab by mouth two (2) times a day. mometasone 0.1 % ointment Commonly known as:  Margdeirdre [place] Apply  to affected area daily. OTHER Take 150 mg by mouth two (2) times a day. Vitacost Benfotiamine-Take 2 tab BID  
  
 quiNINE 324 mg capsule Take 324 mg by mouth nightly as needed. tadalafil 5 mg tablet Commonly known as:  CIALIS  
TAKE 1 TABLET DAILY Telmisartan-amLODIPine 80-5 mg Tab TAKE 1 TABLET NIGHTLY FOR BLOOD PRESSURE  
  
 VITAMIN B-12 1,000 mcg tablet Generic drug:  cyanocobalamin Take 1,000 mcg by mouth daily. Nature Made VITAMIN D3 5,000 unit Tab tablet Generic drug:  cholecalciferol (VITAMIN D3) Take  by mouth daily. Nature Made April 2017 Details Sun Mon Tue Wed Thu Fri Sat  
        1  
  
  
  
  
  2  
  
  
  
   3  
  
  
  
   4  
  
  
  
   5  
  
  
  
   6  
  
  
  
   7  
  
  
  
   8  
  
  
  
  
  9  
  
  
  
   10  
  
  
  
   11  
  
  
  
   12  
  
  
  
   13  
  
  
  
   14  
  
  
  
   15  
  
  
  
  
  16  
  
  
  
   17  
  
  
  
   18  
  
  
  
   19  
  
  
  
   20 2.2  
8 mg See details 21  
  
8 mg  
  
   22  
  
8 mg  
  
  
  23  
  
8 mg  
  
   24  
  
8 mg  
  
   25  
  
8 mg  
  
   26  
  
8 mg  
  
   27  
  
8 mg  
  
   28  
  
8 mg  
  
   29  
  
8 mg  
  
  
  30  
  
8 mg Date Details 04/20 This INR check INR: 2.2 How to take your warfarin dose To take:  8 mg Take 2 of the 4 mg tablets. May 2017 Details Blayne Ames Tue Wed Thu Fri Sat 1  
  
8 mg 2  
  
8 mg 3  
  
8 mg 4  
  
8 mg 5  
  
8 mg 6  
  
8 mg 7  
  
8 mg 8  
  
8 mg    9  
  
8 mg  
  
   10  
  
8 mg  
  
   11  
  
 8 mg  
  
   12  
  
8 mg  
  
   13  
  
8 mg  
  
  
  14  
  
8 mg  
  
   15  
  
8 mg  
  
   16  
  
8 mg  
  
   17  
  
8 mg  
  
   18  
  
8 mg  
  
   19  
  
8 mg  
  
   20  
  
8 mg  
  
  
  21  
  
8 mg  
  
   22  
  
8 mg  
  
   23  
  
8 mg  
  
   24  
  
8 mg  
  
   25  
  
8 mg  
  
   26  
  
  
  
   27  
  
  
  
  
  28  
  
  
  
   29  
  
  
  
   30  
  
  
  
   31  
  
  
  
     
 Date Details No additional details Date of next INR:  5/25/2017 How to take your warfarin dose To take:  8 mg Take 2 of the 4 mg tablets. We Performed the Following AMB POC PT/INR [88456 CPT(R)] Follow-up Instructions Return in about 5 weeks (around 5/25/2017), or if symptoms worsen or fail to improve. Introducing [facility]! Mile Marques introduces [employer] patient portal. Now you can access parts of your medical record, email your doctor's office, and request medication refills online. 1. In your internet browser, go to [email or web address] 2. Click on the First Time User? Click Here link in the Sign In box. You will see the New Member Sign Up page. 3. Enter your [employer] Access Code exactly as it appears below. You will not need to use this code after youve completed the sign-up process. If you do not sign up before the expiration date, you must request a new code. · [employer] Access Code: [email or web address] Expires: 5/30/2017  5:34 PM 
 
4. Enter the last four digits of your Social Security Number (xxxx) and Date of Birth (mm/dd/yyyy) as indicated and click Submit. You will be taken to the next sign-up page. 5. Create a [employer]t ID. This will be your [employer] login ID and cannot be changed, so think of one that is secure and easy to remember. 6. Create a [employer] password. You can change your password at any time. 7. Enter your Password Reset Question and Answer. This can be used at a later time if you forget your password. 8. Enter your e-mail address. You will receive e-mail notification when new information is available in [address] 9. Click Sign Up. You can now view and download portions of your medical record. 10. Click the Download Summary menu link to download a portable copy of your medical information. If you have questions, please visit the Frequently Asked Questions section of the InCorta website. Remember, InCorta is NOT to be used for urgent needs. For medical emergencies, dial 911. Now available from your iPhone and Android! Please provide this summary of care documentation to your next provider. Your primary care clinician is listed as Jaylan Cool. If you have any questions after today's visit, please call [phone].

## 2017-04-20 NOTE — PROGRESS NOTES
Chief Complaint   Patient presents with    Follow-up     PT/INR   Discuss new medication for GERD had to stop Lansoprazole  Room 2

## 2017-04-20 NOTE — PROGRESS NOTES
Subjective:     Chief Complaint   Patient presents with    Follow-up     PT/INR      He  is a 80 y.o. male who presents for evaluation of:  Here for INR check. 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 See anti-coag tab for history and dosing with plan. Pmhx sig for DM2 (seeing Dr. 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 Rarely uses pain meds. Has been out for > 6 months. ROS  Gen - no fever/chills  Resp - no dyspnea or cough  CV - no chest pain or DIAZ  Rest per HPI    Past Medical History:   Diagnosis Date    Back injury 1994    Diabetes ([address]     Muscle cramps     Reflux     Thromboembolus ([address]      Past Surgical History:   Procedure Laterality Date    HX ORTHOPAEDIC Left     Crushed/left index finger amputee    HX OTHER SURGICAL      hand surgery, heal spur     Current Outpatient Prescriptions on File Prior to Visit   Medication Sig Dispense Refill    cholecalciferol, VITAMIN D3, (VITAMIN D3) 5,000 unit tab tablet Take  by mouth daily. Nature Made      cyanocobalamin (VITAMIN B-12) 1,000 mcg tablet Take 1,000 mcg by mouth daily. Nature Made      COUMADIN 4 mg tablet Take 2 Tabs by mouth daily. BRAND MEDICALLY NECESSARY 180 Tab 0    Telmisartan-amLODIPine 80-5 mg tab TAKE 1 TABLET NIGHTLY FOR BLOOD PRESSURE 90 Tab 1    lansoprazole (PREVACID) 15 mg capsule Take 1 Cap by mouth Daily (before breakfast). Lower dose (Patient taking differently: Take 15 mg by mouth Daily (before breakfast). Lower dose  Indications: Skip Wednesday and Friday) 90 Cap 1    metFORMIN (GLUCOPHAGE) 1,000 mg tablet Take 1 Tab by mouth two (2) times a day. 180 Tab 1    finasteride (PROSCAR) 5 mg tablet Take 1 Tab by mouth daily. Indications: SYMPTOMATIC BENIGN PROSTATIC HYPERPLASIA 30 Tab 5    Insulin Needles, Disposable, (ALTAGRACIA PEN NEEDLE) 32 gauge x 5/32\" ndle 1 Each by SubCUTAneous route daily.  100 Pen Needle 3    tadalafil (CIALIS) 5 mg tablet TAKE 1 TABLET DAILY 90 Tab 3    exenatide microspheres (BYDUREON) 2 mg/0.65 mL pnij 2 mg by SubCUTAneous route every seven (7) days. For diabetes 8 mL 3    magnesium oxide (MAG-OX) 400 mg tablet Take 1 Tab by mouth four (4) times daily. For low magnesium (Patient taking differently: Take 1,600 mg by mouth daily. For low magnesium) 360 Tab 3    insulin glargine (LANTUS SOLOSTAR) 100 unit/mL (3 mL) pen 55 Units by SubCUTAneous route daily. 4 Package 4    Insulin Needles, Disposable, (BD INSULIN PEN NEEDLE UF MINI) 31 x 3/16 \" ndle 1 each by SubCUTAneous route daily. 100 each 3    OTHER Take 150 mg by mouth two (2) times a day. Vitacost Benfotiamine-Take 2 tab BID      cyclobenzaprine (FLEXERIL) 10 mg tablet Take  by mouth three (3) times daily as needed for Muscle Spasm(s).  quiNINE 324 mg capsule Take 324 mg by mouth nightly as needed. No current facility-administered medications on file prior to visit. Objective:     Vitals:    04/20/17 1024   BP: 133/69   Pulse: 82   Resp: 18   Temp: 96.8 °F (36 °C)   TempSrc: Oral   SpO2: 96%   Weight: 280 lb 9.6 oz (127.3 kg)   Height: 5' 8.5\" (1.74 m)     Physical Examination:  General appearance - alert, well appearing, and in no distress  Eyes -sclera anicteric  Neck - supple, no significant adenopathy, no thyromegaly, no bruits  Chest - clear to auscultation, no wheezes, rales or rhonchi, symmetric air entry  Heart - normal rate, regular rhythm, normal S1, S2, no murmurs, rubs, clicks or gallops  Neurological - alert, oriented, no focal findings or movement disorder noted  Extr - trace edema    Assessment/ Plan:   Wilmer Grove was seen today for follow-up.     Diagnoses and all orders for this visit:    Hypercoagulable state ([address]  History of venous thromboembolism  Encounter for monitoring coumadin therapy  -     AMB POC PT/INR    Gastroesophageal reflux disease, esophagitis presence not specified - being taken off Prevacid and has questions about other options. Discussed CKD risks and ct use of Prevacid/Zantac/Tums as needed for GERD sx instead of daily. Major focus on avoiding triggers and losing weight. I have discussed the diagnosis with the patient and the intended plan as seen in the above orders. The patient has received an after-visit summary and questions were answered concerning future plans. I have discussed medication side effects and warnings with the patient as well. The patient verbalizes understanding and agreement with the plan. Follow-up Disposition:  Return in about 5 weeks (around 5/25/2017), or if symptoms worsen or fail to improve.

## 2017-05-05 NOTE — PROGRESS NOTES
Luís Raymond,. Please mail lab letter. Labs are stable and appears to have been reviewed already with PCP, Dr Simone Cleveland.  I do not think you need to call him

## 2017-05-18 ENCOUNTER — OFFICE VISIT (OUTPATIENT)
Dept: FAMILY MEDICINE CLINIC | Age: 82
End: 2017-05-18

## 2017-05-18 VITALS
TEMPERATURE: 96.4 F | HEIGHT: 69 IN | HEART RATE: 71 BPM | WEIGHT: 279 LBS | RESPIRATION RATE: 20 BRPM | SYSTOLIC BLOOD PRESSURE: 128 MMHG | DIASTOLIC BLOOD PRESSURE: 63 MMHG | BODY MASS INDEX: 41.32 KG/M2 | OXYGEN SATURATION: 98 %

## 2017-05-18 DIAGNOSIS — Z51.81 ENCOUNTER FOR MONITORING COUMADIN THERAPY: ICD-10-CM

## 2017-05-18 DIAGNOSIS — K21.9 GASTROESOPHAGEAL REFLUX DISEASE, ESOPHAGITIS PRESENCE NOT SPECIFIED: ICD-10-CM

## 2017-05-18 DIAGNOSIS — L30.9 DERMATITIS: ICD-10-CM

## 2017-05-18 DIAGNOSIS — Z86.718 HISTORY OF VENOUS THROMBOEMBOLISM: ICD-10-CM

## 2017-05-18 DIAGNOSIS — E83.42 HYPOMAGNESEMIA: ICD-10-CM

## 2017-05-18 DIAGNOSIS — Z79.01 ENCOUNTER FOR MONITORING COUMADIN THERAPY: ICD-10-CM

## 2017-05-18 DIAGNOSIS — D68.59 HYPERCOAGULABLE STATE (HCC): Primary | ICD-10-CM

## 2017-05-18 LAB
INR BLD: 2.2
PT POC: NORMAL SECONDS
VALID INTERNAL CONTROL?: YES

## 2017-05-18 RX ORDER — CALC/MAG/B COMPLEX/D3/HERB 61
15 TABLET ORAL
Qty: 90 CAP | Refills: 0 | Status: SHIPPED | OUTPATIENT
Start: 2017-05-18 | End: 2017-07-12 | Stop reason: ALTCHOICE

## 2017-05-18 RX ORDER — TRIAMCINOLONE ACETONIDE 5 MG/G
OINTMENT TOPICAL 2 TIMES DAILY
Qty: 30 G | Refills: 0 | Status: SHIPPED | OUTPATIENT
Start: 2017-05-18 | End: 2017-10-05 | Stop reason: ALTCHOICE

## 2017-05-18 NOTE — Clinical Note
Amisha Simon, just wanted let you know this pt looks like he is failing to taper off the Prevacid with sig sx so I restarted it. I asked him to check in with you about this since it was related to his mag & K levels. Appreciate your help.   Thanks, Presley Lujan

## 2017-05-18 NOTE — PROGRESS NOTES
Chief Complaint   Patient presents with    Follow-up     PT/INR   Discuss GERD endocrinologist stopped Lansoprazole  Has tried Mylanta and Tums with out relief  Burping constantly and diarrhea   Unable to sleep last night  Room 4

## 2017-05-18 NOTE — MR AVS SNAPSHOT
Visit Information Date & Time Provider Department Dept. Phone Encounter #  
 5/18/2017 10:10 AM Daija Alexander MD [facility] at [address] [id number] Follow-up Instructions Return in about 6 weeks (around 6/29/2017), or if symptoms worsen or fail to improve. Your Appointments 6/20/2017  9:50 AM  
ROUTINE CARE with Daija Alexander MD  
[facility] at [address] [facility]) Appt Note: 3mo fu  
 [address] [address]  
[facility]  
  
    
 7/12/2017 11:30 AM  
Follow Up with MD [facility]ardo [facility]) Appt Note: f/u       dm        4 month  
 [address] [facility] [address] [phone] [address] [place] [address] Upcoming Health Maintenance Date Due  
 EYE EXAM RETINAL OR DILATED Q1 8/15/1944 GLAUCOMA SCREENING Q2Y 8/15/1999 INFLUENZA AGE 9 TO ADULT 8/1/2017 HEMOGLOBIN A1C Q6M 9/15/2017 MICROALBUMIN Q1 11/16/2017 LIPID PANEL Q1 11/16/2017 FOOT EXAM Q1 3/15/2018 MEDICARE YEARLY EXAM 3/21/2018 Pneumococcal 65+ Low/Medium Risk (2 of 2 - PPSV23) 5/18/2018 DTaP/Tdap/Td series (2 - Td) 5/18/2027 Allergies as of 5/18/2017  Review Complete On: 5/18/2017 By: Jonathan Escobar LPN Severity Noted Reaction Type Reactions Statins-hmg-coa Reductase Inhibitors  03/23/2016   Side Effect Myalgia Tried several statins. Current Immunizations  Never Reviewed No immunizations on file. Not reviewed this visit You Were Diagnosed With   
  
 Codes Comments Hypercoagulable state ([facility]    -  Primary ICD-10-CM: P29.29 
ICD-9-CM: 289.81 History of venous thromboembolism     ICD-10-CM: Z86.718 ICD-9-CM: V12.51   
 Encounter for monitoring coumadin therapy     ICD-10-CM: Z51.81, Z79.01 
ICD-9-CM: V58.83, V58.61 Gastroesophageal reflux disease, esophagitis presence not specified     ICD-10-CM: K21.9 ICD-9-CM: 530.81 Hypomagnesemia     ICD-10-CM: P74.85 
ICD-9-CM: 275.2 Dermatitis     ICD-10-CM: L30.9 ICD-9-CM: 692.9 Vitals BP Pulse Temp Resp Height(growth percentile) Weight(growth percentile) 128/63 (BP 1 Location: Left arm, BP Patient Position: Sitting) 71 96.4 °F (35.8 °C) (Oral) 20 5' 8.5\" (1.74 m) 279 lb (126.6 kg) SpO2 BMI Smoking Status 98% 41.8 kg/m2 Former Smoker BMI and BSA Data Body Mass Index Body Surface Area  
 41.8 kg/m 2 2.47 m 2 Preferred Pharmacy Pharmacy Name Phone [id number] [address] [phone] Your Updated Medication List  
  
   
This list is accurate as of: 5/18/17 11:13 AM.  Always use your most recent med list.  
  
  
  
  
 COUMADIN 4 mg tablet Generic drug:  warfarin Take 2 Tabs by mouth daily. BRAND MEDICALLY NECESSARY exenatide microspheres 2 mg/0.65 mL Pnij Commonly known as:  BYDUREON  
2 mg by SubCUTAneous route every seven (7) days. For diabetes  
  
 finasteride 5 mg tablet Commonly known as:  PROSCAR Take 1 Tab by mouth daily. Indications: SYMPTOMATIC BENIGN PROSTATIC HYPERPLASIA FLEXERIL 10 mg tablet Generic drug:  cyclobenzaprine Take  by mouth three (3) times daily as needed for Muscle Spasm(s). insulin glargine 100 unit/mL (3 mL) pen Commonly known as:  LANTUS SOLOSTAR  
55 Units by SubCUTAneous route daily. Insulin Needles (Disposable) 31 gauge x 3/16\" Ndle Commonly known as:  BD INSULIN PEN NEEDLE UF MINI  
1 each by SubCUTAneous route daily. lansoprazole 15 mg capsule Commonly known as:  PREVACID Take 1 Cap by mouth Daily (before breakfast). Lower dose  
  
 magnesium oxide 400 mg tablet Commonly known as:  MAG-OX Take 1 Tab by mouth four (4) times daily. For low magnesium  
  
 metFORMIN 1,000 mg tablet Commonly known as:  GLUCOPHAGE Take 1 Tab by mouth two (2) times a day. OTHER Take 150 mg by mouth two (2) times a day. Vitacost Benfotiamine-Take 2 tab BID  
  
 quiNINE 324 mg capsule Take 324 mg by mouth nightly as needed. tadalafil 5 mg tablet Commonly known as:  CIALIS  
TAKE 1 TABLET DAILY Telmisartan-amLODIPine 80-5 mg Tab TAKE 1 TABLET NIGHTLY FOR BLOOD PRESSURE  
  
 triamcinolone acetonide 0.5 % ointment Commonly known as:  KENALOG Apply  to affected area two (2) times a day. use thin layer VITAMIN B-12 1,000 mcg tablet Generic drug:  cyanocobalamin Take 1,000 mcg by mouth daily. Nature Made VITAMIN D3 5,000 unit Tab tablet Generic drug:  cholecalciferol (VITAMIN D3) Take  by mouth daily. PastBook Made Prescriptions Sent to Pharmacy Refills  
 lansoprazole (PREVACID) 15 mg capsule 0 Sig: Take 1 Cap by mouth Daily (before breakfast). Lower dose Class: Normal  
 Pharmacy: [address], [address] [facility] Ph #: [phone] Route: Oral  
 triamcinolone acetonide (KENALOG) 0.5 % ointment 0 Sig: Apply  to affected area two (2) times a day. use thin layer Class: Normal  
 Pharmacy: [id number] [address], [address] [place] [address] Ph #: [phone] Route: Topical  
  
May 2017 Details Sun Mon Tue Wed Thu Fri Sat  
   1  
  
  
  
   2  
  
  
  
   3  
  
  
  
   4  
  
  
  
   5  
  
  
  
   6  
  
  
  
  
  7  
  
  
  
   8  
  
  
  
   9  
  
  
  
   10  
  
  
  
   11  
  
  
  
   12  
  
  
  
   13  
  
  
  
  
  14  
  
  
  
   15  
  
  
  
   16  
  
  
  
   17  
  
  
  
   18  
  
8 mg See details 19  
  
8 mg  
  
   20  
  
8 mg  
  
  
  21  
  
8 mg  
  
   22  
  
8 mg  
  
   23  
  
8 mg 24 8 mg  
  
   25  
  
8 mg  
  
   26  
  
8 mg  
  
   27  
  
8 mg  
  
  
  28  
  
8 mg  
  
   29  
  
8 mg  
  
   30  
  
8 mg  
  
   31  
  
8 mg Date Details 05/18 This INR check INR: 2.2 How to take your warfarin dose To take:  8 mg Take 2 of the 4 mg tablets. June 2017 Details Jyoti Goncalves Wed Thu Fri Sat 1  
  
8 mg 2  
  
8 mg 3  
  
8 mg 4  
  
8 mg 5  
  
8 mg 6  
  
8 mg 7  
  
8 mg 8  
  
8 mg 9  
  
8 mg  
  
   10  
  
8 mg  
  
  
  11  
  
8 mg  
  
   12  
  
8 mg  
  
   13  
  
8 mg  
  
   14  
  
8 mg  
  
   15  
  
8 mg  
  
   16  
  
8 mg  
  
   17  
  
8 mg  
  
  
  18  
  
8 mg  
  
   19  
  
8 mg  
  
   20  
  
8 mg  
  
   21  
  
8 mg  
  
   22  
  
8 mg  
  
   23  
  
8 mg  
  
   24  
  
8 mg  
  
  
  25  
  
8 mg  
  
   26  
  
8 mg  
  
   27  
  
8 mg  
  
   28  
  
8 mg  
  
   29  
  
8 mg  
  
   30 Date Details No additional details Date of next INR:  6/29/2017 How to take your warfarin dose To take:  8 mg Take 2 of the 4 mg tablets. We Performed the Following AMB POC PT/INR [20380 CPT(R)] Follow-up Instructions Return in about 6 weeks (around 6/29/2017), or if symptoms worsen or fail to improve. Introducing [facility]! Joseluis Pimentel introduces [employer] patient portal. Now you can access parts of your medical record, email your doctor's office, and request medication refills online. 1. In your internet browser, go to [email or web address] 2. Click on the First Time User? Click Here link in the Sign In box. You will see the New Member Sign Up page. 3. Enter your [employer] Access Code exactly as it appears below. You will not need to use this code after youve completed the sign-up process.  If you do not sign up before the expiration date, you must request a new code. 
 
· boo-box Access Code: [email or web address] Expires: 5/30/2017  5:34 PM 
 
4. Enter the last four digits of your Social Security Number (xxxx) and Date of Birth (mm/dd/yyyy) as indicated and click Submit. You will be taken to the next sign-up page. 5. Create a MoveinBluet ID. This will be your boo-box login ID and cannot be changed, so think of one that is secure and easy to remember. 6. Create a boo-box password. You can change your password at any time. 7. Enter your Password Reset Question and Answer. This can be used at a later time if you forget your password. 8. Enter your e-mail address. You will receive e-mail notification when new information is available in [address] 9. Click Sign Up. You can now view and download portions of your medical record. 10. Click the Download Summary menu link to download a portable copy of your medical information. If you have questions, please visit the Frequently Asked Questions section of the boo-box website. Remember, boo-box is NOT to be used for urgent needs. For medical emergencies, dial 911. Now available from your iPhone and Android! Please provide this summary of care documentation to your next provider. Your primary care clinician is listed as Moreno Wall. If you have any questions after today's visit, please call [phone].

## 2017-05-18 NOTE — PROGRESS NOTES
Subjective:     Chief Complaint   Patient presents with    Follow-up     PT/INR      He  is a 80 y.o. male who presents for evaluation of:  Here for INR check. 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 See anti-coag tab for history and dosing with plan. Pmhx sig for DM2 (seeing Dr. 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 Rarely uses pain meds. GERD - currently being taken off Prevacid by Dr. Lisha Wright d/t hypomagnesemia. Failed this in the past.  Has not had any Prevacid x 4 weeks. Taking some Mylanta, OTC acid relief 360 supplement, and Pepcid over the past 1-2 weeks. He is having more severe GERD sx. Also having diarrhea daily. Feeling nausea but no vomiting. No hematemesis, hematochezia, or melena. Feels like he has diarrhea immediately after eating. ROS  Gen - no fever/chills  Resp - no dyspnea or cough  CV - no chest pain or DIAZ  Skin - notes rash on leg x 2 yrs after hitting it in the woods. Tried a cream in past that helped clear up most of the rash. Is itchy. No hx of psoriasis or eczema. Rest per HPI    Past Medical History:   Diagnosis Date    Back injury 1994    Diabetes ([address]     Muscle cramps     Reflux     Thromboembolus ([address]      Past Surgical History:   Procedure Laterality Date    HX ORTHOPAEDIC Left     Crushed/left index finger amputee    HX OTHER SURGICAL      hand surgery, heal spur     Current Outpatient Prescriptions on File Prior to Visit   Medication Sig Dispense Refill    cholecalciferol, VITAMIN D3, (VITAMIN D3) 5,000 unit tab tablet Take  by mouth daily. Nature Made      cyanocobalamin (VITAMIN B-12) 1,000 mcg tablet Take 1,000 mcg by mouth daily. Nature Made      COUMADIN 4 mg tablet Take 2 Tabs by mouth daily.  BRAND MEDICALLY NECESSARY 180 Tab 0    Telmisartan-amLODIPine 80-5 mg tab TAKE 1 TABLET NIGHTLY FOR BLOOD PRESSURE 90 Tab 1    metFORMIN (GLUCOPHAGE) 1,000 mg tablet Take 1 Tab by mouth two (2) times a day. 180 Tab 1    finasteride (PROSCAR) 5 mg tablet Take 1 Tab by mouth daily. Indications: SYMPTOMATIC BENIGN PROSTATIC HYPERPLASIA 30 Tab 5    tadalafil (CIALIS) 5 mg tablet TAKE 1 TABLET DAILY 90 Tab 3    exenatide microspheres (BYDUREON) 2 mg/0.65 mL pnij 2 mg by SubCUTAneous route every seven (7) days. For diabetes 8 mL 3    magnesium oxide (MAG-OX) 400 mg tablet Take 1 Tab by mouth four (4) times daily. For low magnesium (Patient taking differently: Take 1,600 mg by mouth daily. For low magnesium) 360 Tab 3    insulin glargine (LANTUS SOLOSTAR) 100 unit/mL (3 mL) pen 55 Units by SubCUTAneous route daily. 4 Package 4    Insulin Needles, Disposable, (BD INSULIN PEN NEEDLE UF MINI) 31 x 3/16 \" ndle 1 each by SubCUTAneous route daily. 100 each 3    OTHER Take 150 mg by mouth two (2) times a day. Vitacost Benfotiamine-Take 2 tab BID      cyclobenzaprine (FLEXERIL) 10 mg tablet Take  by mouth three (3) times daily as needed for Muscle Spasm(s).  quiNINE 324 mg capsule Take 324 mg by mouth nightly as needed. No current facility-administered medications on file prior to visit. Objective:     Vitals:    05/18/17 1013   BP: 128/63   Pulse: 71   Resp: 20   Temp: 96.4 °F (35.8 °C)   TempSrc: Oral   SpO2: 98%   Weight: 279 lb (126.6 kg)   Height: 5' 8.5\" (1.74 m)     Physical Examination:  General appearance - alert, well appearing, and in no distress  Eyes -sclera anicteric  Neck - supple, no significant adenopathy, no thyromegaly, no bruits  Chest - clear to auscultation, no wheezes, rales or rhonchi, symmetric air entry  Heart - normal rate, regular rhythm, normal S1, S2, no murmurs, rubs, clicks or gallops  Abd - soft, NT, ND, +BS, belching frequently  Neurological - alert, oriented, no focal findings or movement disorder noted  Extr - trace edema  Skin -          Assessment/ Plan:   Carlton Lu was seen today for follow-up.     Diagnoses and all orders for this visit:    Hypercoagulable state ([facility]  History of venous thromboembolism  Encounter for monitoring coumadin therapy  -     AMB POC PT/INR    Gastroesophageal reflux disease, esophagitis presence not specified  Hypomagnesemia  - being taken off Prevacid but has failed taper with sig sx so restarting Prevacid 30 mg daily x 1 week and then back to Prevacid 15 mg daily if improved. Discussed CKD risks. Major focus on avoiding triggers and losing weight. Asking him to check in with Dr. Mahendra Morgan about this soon. If severe sx, will consider switching to another PPI or send to GI.  -     lansoprazole (PREVACID) 15 mg capsule; Take 1 Cap by mouth Daily (before breakfast). Lower dose    Dermatitis  -     triamcinolone acetonide (KENALOG) 0.5 % ointment; Apply  to affected area two (2) times a day. use thin layer    I have discussed the diagnosis with the patient and the intended plan as seen in the above orders. The patient has received an after-visit summary and questions were answered concerning future plans. I have discussed medication side effects and warnings with the patient as well. The patient verbalizes understanding and agreement with the plan. Follow-up Disposition:  Return in about 6 weeks (around 6/29/2017), or if symptoms worsen or fail to improve.

## 2017-06-20 ENCOUNTER — OFFICE VISIT (OUTPATIENT)
Dept: FAMILY MEDICINE CLINIC | Age: 82
End: 2017-06-20

## 2017-06-20 VITALS
HEART RATE: 67 BPM | OXYGEN SATURATION: 96 % | DIASTOLIC BLOOD PRESSURE: 57 MMHG | BODY MASS INDEX: 41.77 KG/M2 | WEIGHT: 282 LBS | RESPIRATION RATE: 18 BRPM | HEIGHT: 69 IN | TEMPERATURE: 96.5 F | SYSTOLIC BLOOD PRESSURE: 130 MMHG

## 2017-06-20 DIAGNOSIS — Z86.718 HISTORY OF RECURRENT DEEP VEIN THROMBOSIS (DVT): ICD-10-CM

## 2017-06-20 DIAGNOSIS — Z51.81 ENCOUNTER FOR MONITORING COUMADIN THERAPY: ICD-10-CM

## 2017-06-20 DIAGNOSIS — Z79.4 TYPE 2 DIABETES MELLITUS WITHOUT COMPLICATION, WITH LONG-TERM CURRENT USE OF INSULIN (HCC): ICD-10-CM

## 2017-06-20 DIAGNOSIS — K21.9 GASTROESOPHAGEAL REFLUX DISEASE, ESOPHAGITIS PRESENCE NOT SPECIFIED: ICD-10-CM

## 2017-06-20 DIAGNOSIS — D68.59 HYPERCOAGULABLE STATE (HCC): Primary | ICD-10-CM

## 2017-06-20 DIAGNOSIS — Z79.01 ENCOUNTER FOR MONITORING COUMADIN THERAPY: ICD-10-CM

## 2017-06-20 DIAGNOSIS — E11.9 TYPE 2 DIABETES MELLITUS WITHOUT COMPLICATION, WITH LONG-TERM CURRENT USE OF INSULIN (HCC): ICD-10-CM

## 2017-06-20 DIAGNOSIS — I10 ESSENTIAL HYPERTENSION: ICD-10-CM

## 2017-06-20 LAB
INR BLD: 2.5
PT POC: NORMAL SECONDS
VALID INTERNAL CONTROL?: YES

## 2017-06-20 RX ORDER — METFORMIN HYDROCHLORIDE 1000 MG/1
1000 TABLET ORAL 2 TIMES DAILY
Qty: 180 TAB | Refills: 1 | Status: SHIPPED | OUTPATIENT
Start: 2017-06-20 | End: 2017-11-13 | Stop reason: SDUPTHER

## 2017-06-20 RX ORDER — PEN NEEDLE, DIABETIC 32GX 5/32"
NEEDLE, DISPOSABLE MISCELLANEOUS
COMMUNITY
Start: 2017-05-24 | End: 2018-04-18 | Stop reason: SDUPTHER

## 2017-06-20 RX ORDER — WARFARIN SODIUM 4 MG/1
8 TABLET ORAL DAILY
Qty: 180 TAB | Refills: 0 | Status: SHIPPED | OUTPATIENT
Start: 2017-06-20 | End: 2017-10-29 | Stop reason: SDUPTHER

## 2017-06-20 RX ORDER — TELMISARTAN AND AMLODIPINE 5; 80 MG/1; MG/1
TABLET ORAL
Qty: 90 TAB | Refills: 1 | Status: SHIPPED | OUTPATIENT
Start: 2017-06-20 | End: 2017-11-13 | Stop reason: SDUPTHER

## 2017-06-20 NOTE — PROGRESS NOTES
Chief Complaint   Patient presents with    Diabetes     3 month follow-up   Follow-up PT/INR  Continue to have loose stools  Room 6

## 2017-06-20 NOTE — PROGRESS NOTES
Subjective:     Chief Complaint   Patient presents with    Diabetes     3 month follow-up      He  is a 80 y.o. male who presents for evaluation of:  Here for INR check. 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 See anti-coag tab for history and dosing with plan. Pmhx sig for DM2 (seeing Dr. 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 Rarely uses pain meds. GERD - was being taken off Prevacid by Dr. Esther Horan d/t hypomagnesemia. He was unable to go off PPI d/t GERD sx flaring. He failed this in the past as well. Sx are much better back on Prevacid. ROS  Gen - no fever/chills  Resp - no dyspnea or cough  CV - no chest pain or DIAZ  Skin - notes rash on leg x 2 yrs after hitting it in the woods. Tried a cream in past that helped clear up most of the rash. Is itchy. No hx of psoriasis or eczema. Feels like this is getting better with Amlactin. Rest per HPI    Past Medical History:   Diagnosis Date    Back injury 1994    Diabetes ([address]     Muscle cramps     Reflux     Thromboembolus ([address]      Past Surgical History:   Procedure Laterality Date    HX ORTHOPAEDIC Left     Crushed/left index finger amputee    HX OTHER SURGICAL      hand surgery, heal spur     Current Outpatient Prescriptions on File Prior to Visit   Medication Sig Dispense Refill    lansoprazole (PREVACID) 15 mg capsule Take 1 Cap by mouth Daily (before breakfast). Lower dose 90 Cap 0    triamcinolone acetonide (KENALOG) 0.5 % ointment Apply  to affected area two (2) times a day. use thin layer 30 g 0    cholecalciferol, VITAMIN D3, (VITAMIN D3) 5,000 unit tab tablet Take  by mouth daily. Nature Made      cyanocobalamin (VITAMIN B-12) 1,000 mcg tablet Take 1,000 mcg by mouth daily. Nature Made      finasteride (PROSCAR) 5 mg tablet Take 1 Tab by mouth daily.  Indications: SYMPTOMATIC BENIGN PROSTATIC HYPERPLASIA 30 Tab 5    tadalafil (CIALIS) 5 mg tablet TAKE 1 TABLET DAILY 90 Tab 3    exenatide microspheres (BYDUREON) 2 mg/0.65 mL pnij 2 mg by SubCUTAneous route every seven (7) days. For diabetes 8 mL 3    magnesium oxide (MAG-OX) 400 mg tablet Take 1 Tab by mouth four (4) times daily. For low magnesium (Patient taking differently: Take 1,600 mg by mouth daily. For low magnesium) 360 Tab 3    insulin glargine (LANTUS SOLOSTAR) 100 unit/mL (3 mL) pen 55 Units by SubCUTAneous route daily. 4 Package 4    Insulin Needles, Disposable, (BD INSULIN PEN NEEDLE UF MINI) 31 x 3/16 \" ndle 1 each by SubCUTAneous route daily. 100 each 3    OTHER Take 150 mg by mouth two (2) times a day. Vitacost Benfotiamine-Take 2 tab BID      cyclobenzaprine (FLEXERIL) 10 mg tablet Take  by mouth three (3) times daily as needed for Muscle Spasm(s).  quiNINE 324 mg capsule Take 324 mg by mouth nightly as needed. No current facility-administered medications on file prior to visit. Objective:     Vitals:    06/20/17 1012   BP: 130/57   Pulse: 67   Resp: 18   Temp: 96.5 °F (35.8 °C)   TempSrc: Oral   SpO2: 96%   Weight: 282 lb (127.9 kg)   Height: 5' 8.5\" (1.74 m)     Physical Examination:  General appearance - alert, well appearing, and in no distress  Eyes -sclera anicteric  Neck - supple, no significant adenopathy, no thyromegaly, no bruits  Chest - clear to auscultation, no wheezes, rales or rhonchi, symmetric air entry  Heart - normal rate, regular rhythm, normal S1, S2, no murmurs, rubs, clicks or gallops  Abd - soft, NT, ND, +BS, belching frequently  Neurological - alert, oriented, no focal findings or movement disorder noted  Extr - trace edema  Skin - LLE rash with some surrounding erythema but improving    Assessment/ Plan:   Myles Marques was seen today for follow-up.     Diagnoses and all orders for this visit:    Hypercoagulable state ([address]  History of venous thromboembolism  Encounter for monitoring coumadin therapy  -     AMB POC PT/INR   -     COUMADIN 4 mg tablet; Take 2 Tabs by mouth daily. BRAND MEDICALLY NECESSARY    Essential hypertension - controlled  -     Telmisartan-amLODIPine 80-5 mg tab; TAKE 1 TABLET NIGHTLY FOR BLOOD PRESSURE    GERD - unable to come off Prevacid and sx resolved back on this    Type 2 diabetes mellitus without complication, with long-term current use of insulin ([address] - well controlled. Seeing Dr. Whitley Pro for this. Having some chronic diarrhea and we discussed this may be related to Metformin. He will discuss with Dr. Whitley Pro. -     metFORMIN (GLUCOPHAGE) 1,000 mg tablet; Take 1 Tab by mouth two (2) times a day. I have discussed the diagnosis with the patient and the intended plan as seen in the above orders. The patient has received an after-visit summary and questions were answered concerning future plans. I have discussed medication side effects and warnings with the patient as well. The patient verbalizes understanding and agreement with the plan. Follow-up Disposition:  Return in about 6 weeks (around 8/1/2017).

## 2017-06-20 NOTE — MR AVS SNAPSHOT
Visit Information Date & Time Provider Department Dept. Phone Encounter #  
 6/20/2017  9:50 AM Jan Cortez MD [facility] at [address] [id number] Follow-up Instructions Return in about 6 weeks (around 8/1/2017). Your Appointments 7/12/2017 11:30 AM  
Follow Up with MD Everardo Dupree [facility]) Appt Note: f/u       dm        4 month  
 [address] [id number] [address] [facility] Upcoming Health Maintenance Date Due  
 EYE EXAM RETINAL OR DILATED Q1 8/15/1944 GLAUCOMA SCREENING Q2Y 8/15/1999 INFLUENZA AGE 9 TO ADULT 8/1/2017 HEMOGLOBIN A1C Q6M 9/15/2017 MICROALBUMIN Q1 11/16/2017 LIPID PANEL Q1 11/16/2017 FOOT EXAM Q1 3/15/2018 MEDICARE YEARLY EXAM 3/21/2018 Pneumococcal 65+ Low/Medium Risk (2 of 2 - PPSV23) 5/18/2018 DTaP/Tdap/Td series (2 - Td) 5/18/2027 Allergies as of 6/20/2017  Review Complete On: 6/20/2017 By: Jan Cortez MD  
  
 Severity Noted Reaction Type Reactions Statins-hmg-coa Reductase Inhibitors  03/23/2016   Side Effect Myalgia Tried several statins. Current Immunizations  Never Reviewed No immunizations on file. Not reviewed this visit You Were Diagnosed With   
  
 Codes Comments Hypercoagulable state ([facility]    -  Primary ICD-10-CM: W55.53 
ICD-9-CM: 289.81 History of recurrent deep vein thrombosis (DVT)     ICD-10-CM: B56.005 ICD-9-CM: V12.51 Encounter for monitoring coumadin therapy     ICD-10-CM: Z51.81, Z79.01 
ICD-9-CM: V58.83, V58.61 Essential hypertension     ICD-10-CM: I10 
ICD-9-CM: 401.9 Type 2 diabetes mellitus without complication, with long-term current use of insulin (HCC)     ICD-10-CM: E11.9, Z79.4 ICD-9-CM: 250.00, V58.67 Vitals BP Pulse Temp Resp Height(growth percentile) Weight(growth percentile) 130/57 (BP 1 Location: Left arm, BP Patient Position: Sitting) 67 96.5 °F (35.8 °C) (Oral) 18 5' 8.5\" (1.74 m) 282 lb (127.9 kg) SpO2 BMI Smoking Status 96% 42.25 kg/m2 Former Smoker Vitals History BMI and BSA Data Body Mass Index Body Surface Area  
 42.25 kg/m 2 2.49 m 2 Preferred Pharmacy Pharmacy Name Phone [address] [facility] [phone] Your Updated Medication List  
  
   
This list is accurate as of: 6/20/17 11:09 AM.  Always use your most recent med list.  
  
  
  
  
 COUMADIN 4 mg tablet Generic drug:  warfarin Take 2 Tabs by mouth daily. BRAND MEDICALLY NECESSARY exenatide microspheres 2 mg/0.65 mL Pnij Commonly known as:  BYDUREON  
2 mg by SubCUTAneous route every seven (7) days. For diabetes  
  
 finasteride 5 mg tablet Commonly known as:  PROSCAR Take 1 Tab by mouth daily. Indications: SYMPTOMATIC BENIGN PROSTATIC HYPERPLASIA FLEXERIL 10 mg tablet Generic drug:  cyclobenzaprine Take  by mouth three (3) times daily as needed for Muscle Spasm(s). insulin glargine 100 unit/mL (3 mL) Inpn Commonly known as:  LANTUS SOLOSTAR  
55 Units by SubCUTAneous route daily. Insulin Needles (Disposable) 31 gauge x 3/16\" Ndle Commonly known as:  BD INSULIN PEN NEEDLE UF MINI  
1 each by SubCUTAneous route daily. Linda Pen Needle 32 gauge x 5/32\" Ndle Generic drug:  Insulin Needles (Disposable)  
  
 lansoprazole 15 mg capsule Commonly known as:  PREVACID Take 1 Cap by mouth Daily (before breakfast). Lower dose  
  
 magnesium oxide 400 mg tablet Commonly known as:  MAG-OX Take 1 Tab by mouth four (4) times daily. For low magnesium  
  
 metFORMIN 1,000 mg tablet Commonly known as:  GLUCOPHAGE Take 1 Tab by mouth two (2) times a day.   
  
 OTHER  
 Take 150 mg by mouth two (2) times a day. Vitacost Benfotiamine-Take 2 tab BID  
  
 quiNINE 324 mg capsule Take 324 mg by mouth nightly as needed. tadalafil 5 mg tablet Commonly known as:  CIALIS  
TAKE 1 TABLET DAILY Telmisartan-amLODIPine 80-5 mg Tab TAKE 1 TABLET NIGHTLY FOR BLOOD PRESSURE  
  
 triamcinolone acetonide 0.5 % ointment Commonly known as:  KENALOG Apply  to affected area two (2) times a day. use thin layer VITAMIN B-12 1,000 mcg tablet Generic drug:  cyanocobalamin Take 1,000 mcg by mouth daily. Nature Made VITAMIN D3 5,000 unit Tab tablet Generic drug:  cholecalciferol (VITAMIN D3) Take  by mouth daily. Interneer Made Prescriptions Sent to Pharmacy Refills COUMADIN 4 mg tablet 0 Sig: Take 2 Tabs by mouth daily. BRAND MEDICALLY NECESSARY Class: Normal  
 Pharmacy: [address], [address] Ph #: [phone] Route: Oral  
 Telmisartan-amLODIPine 80-5 mg tab 1 Sig: TAKE 1 TABLET NIGHTLY FOR BLOOD PRESSURE Class: Normal  
 Pharmacy: [address], [address] Ph #: [phone]  
 metFORMIN (GLUCOPHAGE) 1,000 mg tablet 1 Sig: Take 1 Tab by mouth two (2) times a day. Class: Normal  
 Pharmacy: [address], [address] Ph #: [phone] Route: Oral  
  
June 2017 Details Sun Mon Tue Wed Thu Fri Sat  
      1  
  
  
  
   2  
  
  
  
   3  
  
  
  
  
  4  
  
  
  
   5  
  
  
  
   6  
  
  
  
   7  
  
  
  
   8  
  
  
  
   9  
  
  
  
   10  
  
  
  
  
  11  
  
  
  
   12  
  
  
  
   13  
  
  
  
   14  
  
  
  
   15  
  
  
  
   16  
  
  
  
   17  
  
  
  
  
  18  
  
  
  
   19  
  
  
  
   20  
  
8 mg See details 21  
  
8 mg  
  
   22  
  
8 mg  
  
   23  
  
8 mg  
  
   24  
  
8 mg  
  
  
  25  
  
8 mg  
  
   26  
  
8 mg  
  
   27 8 mg  
  
   28  
  
8 mg  
  
   29  
  
8 mg  
  
   30  
  
8 mg Date Details 06/20 This INR check INR: 2.5 How to take your warfarin dose To take:  8 mg Take two of the 4 mg tablets. July 2017 Details Nae Records Tue Wed Thu Fri Sat 1  
  
8 mg 2  
  
8 mg 3  
  
8 mg 4  
  
8 mg 5  
  
8 mg 6  
  
8 mg 7  
  
8 mg 8  
  
8 mg 9  
  
8 mg  
  
   10  
  
8 mg  
  
   11  
  
8 mg  
  
   12  
  
8 mg  
  
   13  
  
8 mg  
  
   14  
  
8 mg  
  
   15  
  
8 mg  
  
  
  16  
  
8 mg  
  
   17  
  
8 mg  
  
   18  
  
8 mg  
  
   19  
  
8 mg  
  
   20  
  
8 mg  
  
   21  
  
8 mg  
  
   22  
  
8 mg  
  
  
  23  
  
8 mg  
  
   24  
  
8 mg  
  
   25  
  
8 mg  
  
   26  
  
8 mg  
  
   27  
  
8 mg  
  
   28  
  
8 mg  
  
   29  
  
8 mg  
  
  
  30  
  
8 mg  
  
   31  
  
8 mg Date Details No additional details How to take your warfarin dose To take:  8 mg Take two of the 4 mg tablets. August 2017 Details Nae Records Tue Wed Thu Fri Sat 1  
  
8 mg  
  
   2  
  
  
  
   3  
  
  
  
   4  
  
  
  
   5  
  
  
  
  
  6  
  
  
  
   7  
  
  
  
   8  
  
  
  
   9  
  
  
  
   10  
  
  
  
   11  
  
  
  
   12  
  
  
  
  
  13  
  
  
  
   14  
  
  
  
   15  
  
  
  
   16  
  
  
  
   17  
  
  
  
   18  
  
  
  
   19  
  
  
  
  
  20  
  
  
  
   21  
  
  
  
   22  
  
  
  
   23  
  
  
  
   24  
  
  
  
   25  
  
  
  
   26  
  
  
  
  
  27  
  
  
  
   28  
  
  
  
   29  
  
  
  
   30  
  
  
  
   31  
  
  
  
    
 Date Details No additional details Date of next INR:  8/1/2017 How to take your warfarin dose To take:  8 mg Take two of the 4 mg tablets. We Performed the Following AMB POC PT/INR [82434 CPT(R)] Follow-up Instructions Return in about 6 weeks (around 8/1/2017). Introducing [facility]! Desireekayli Osoriowillie introduces [employer] patient portal. Now you can access parts of your medical record, email your doctor's office, and request medication refills online. 1. In your internet browser, go to [email or web address] 2. Click on the First Time User? Click Here link in the Sign In box. You will see the New Member Sign Up page. 3. Enter your [employer] Access Code exactly as it appears below. You will not need to use this code after youve completed the sign-up process. If you do not sign up before the expiration date, you must request a new code. · [employer] Access Code: [email or web address] Expires: 9/18/2017 11:08 AM 
 
4. Enter the last four digits of your Social Security Number (xxxx) and Date of Birth (mm/dd/yyyy) as indicated and click Submit. You will be taken to the next sign-up page. 5. Create a D[employer]Link ID. This will be your [employer] login ID and cannot be changed, so think of one that is secure and easy to remember. 6. Create a [employer] password. You can change your password at any time. 7. Enter your Password Reset Question and Answer. This can be used at a later time if you forget your password. 8. Enter your e-mail address. You will receive e-mail notification when new information is available in [address] 9. Click Sign Up. You can now view and download portions of your medical record. 10. Click the Download Summary menu link to download a portable copy of your medical information. If you have questions, please visit the Frequently Asked Questions section of the [employer] website. Remember, [employer]Link is NOT to be used for urgent needs. For medical emergencies, dial 911. Now available from your iPhone and Android! Please provide this summary of care documentation to your next provider. Your primary care clinician is listed as Jan Cortez.  If you have any questions after today's visit, please call [phone].

## 2017-07-12 ENCOUNTER — OFFICE VISIT (OUTPATIENT)
Dept: ENDOCRINOLOGY | Age: 82
End: 2017-07-12

## 2017-07-12 VITALS
HEART RATE: 72 BPM | SYSTOLIC BLOOD PRESSURE: 132 MMHG | WEIGHT: 278.6 LBS | DIASTOLIC BLOOD PRESSURE: 65 MMHG | BODY MASS INDEX: 41.26 KG/M2 | HEIGHT: 69 IN

## 2017-07-12 DIAGNOSIS — I10 ESSENTIAL HYPERTENSION: ICD-10-CM

## 2017-07-12 DIAGNOSIS — Z79.4 TYPE 2 DIABETES MELLITUS WITHOUT COMPLICATION, WITH LONG-TERM CURRENT USE OF INSULIN (HCC): Primary | ICD-10-CM

## 2017-07-12 DIAGNOSIS — E11.9 TYPE 2 DIABETES MELLITUS WITHOUT COMPLICATION, WITH LONG-TERM CURRENT USE OF INSULIN (HCC): Primary | ICD-10-CM

## 2017-07-12 DIAGNOSIS — R35.1 NOCTURIA: ICD-10-CM

## 2017-07-12 DIAGNOSIS — K21.9 GASTROESOPHAGEAL REFLUX DISEASE, ESOPHAGITIS PRESENCE NOT SPECIFIED: ICD-10-CM

## 2017-07-12 DIAGNOSIS — E83.42 HYPOMAGNESEMIA: ICD-10-CM

## 2017-07-12 RX ORDER — FAMOTIDINE 20 MG/1
20 TABLET, FILM COATED ORAL 2 TIMES DAILY
Qty: 180 TAB | Refills: 3 | Status: SHIPPED | OUTPATIENT
Start: 2017-07-12 | End: 2017-11-13 | Stop reason: SDUPTHER

## 2017-07-12 NOTE — PROGRESS NOTES
History of Present Illness: Shimon Prieto is a 80 y.o. male presents for follow-up of diabetes. He has had diabetes for 20 + years. Diabetes related complications:  Neuropathy: + mild sx of neuropathy. No other known complications. A1c 6.2 after visit in March    Current diabetes regimen:  Lantus 55 units   Metformin 1 g BID  Bydureon 2 mg weekly - added 11/2014. Glucoses: No log  or meter to review  120 yesterday AM. Some as low as 110 +/-. Average in 120s. He denies having hypoglycemia symptoms    Weight:  stable. Maintained wt loss of about 13 lbs from several years ago. Lipids - tried several statins, had very bothersome muscle cramps. Unwilling to try again    HTN : amlodipine/telmisartan. Blood pressure well controlled    Hypomagnesemia - 800 mg twice daily. Magnesium has remained low. Prevacid - remains on 15 mg tolerating lower dose. Had bad indigestion/reflux pain last time he tried to taper off. He is willing to try again. Nocturia remains a problem - 5-6 times/night. Taking Cialis 5 mg daily without any noted improvements. He has not seen urology  Past Medical History:   Diagnosis Date    Back injury 1994    Diabetes ([address]     Muscle cramps     Reflux     Thromboembolus (HCC)      Current Outpatient Prescriptions   Medication Sig    ALTAGRACIA PEN NEEDLE 32 gauge x 5/32\" ndle     COUMADIN 4 mg tablet Take 2 Tabs by mouth daily. BRAND MEDICALLY NECESSARY    Telmisartan-amLODIPine 80-5 mg tab TAKE 1 TABLET NIGHTLY FOR BLOOD PRESSURE    metFORMIN (GLUCOPHAGE) 1,000 mg tablet Take 1 Tab by mouth two (2) times a day.  lansoprazole (PREVACID) 15 mg capsule Take 1 Cap by mouth Daily (before breakfast). Lower dose    cholecalciferol, VITAMIN D3, (VITAMIN D3) 5,000 unit tab tablet Take  by mouth daily. Nature Made    cyanocobalamin (VITAMIN B-12) 1,000 mcg tablet Take 1,000 mcg by mouth daily. Nature Made    finasteride (PROSCAR) 5 mg tablet Take 1 Tab by mouth daily. Indications: SYMPTOMATIC BENIGN PROSTATIC HYPERPLASIA    tadalafil (CIALIS) 5 mg tablet TAKE 1 TABLET DAILY    exenatide microspheres (BYDUREON) 2 mg/0.65 mL pnij 2 mg by SubCUTAneous route every seven (7) days. For diabetes    magnesium oxide (MAG-OX) 400 mg tablet Take 1 Tab by mouth four (4) times daily. For low magnesium (Patient taking differently: Take 1,600 mg by mouth daily. For low magnesium)    insulin glargine (LANTUS SOLOSTAR) 100 unit/mL (3 mL) pen 55 Units by SubCUTAneous route daily.  Insulin Needles, Disposable, (BD INSULIN PEN NEEDLE UF MINI) 31 x 3/16 \" ndle 1 each by SubCUTAneous route daily.  OTHER Take 150 mg by mouth two (2) times a day. Vitacost Benfotiamine-Take 2 tab BID    cyclobenzaprine (FLEXERIL) 10 mg tablet Take  by mouth three (3) times daily as needed for Muscle Spasm(s).  triamcinolone acetonide (KENALOG) 0.5 % ointment Apply  to affected area two (2) times a day. use thin layer    quiNINE 324 mg capsule Take 324 mg by mouth nightly as needed. No current facility-administered medications for this visit. Allergies   Allergen Reactions    Statins-Hmg-Coa Reductase Inhibitors Myalgia     Tried several statins.         Review of Systems:  - Eyes: no blurry vision or double vision  - Cardiovascular: no chest pain  - Respiratory: no shortness of breath  - Musculoskeletal: + Muscle cramps  - Neurological: no numbness/tingling in extremities    Physical Examination:  Visit Vitals    /65 (BP 1 Location: Left arm, BP Patient Position: Sitting)    Pulse 72    Ht 5' 8.5\" (1.74 m)    Wt 278 lb 9.6 oz (126.4 kg)    BMI 41.74 kg/m2   -   - General: pleasant, no distress, normal gait   HEENT: hearing intact, EOMI, clear sclera without icterus  - Cardiovascular: regular, normal rate   - Respiratory: normal effort  - Integumentary: no edema  - Psychiatric: normal mood and affect    Data Reviewed:      Component      Latest Ref Rng & Units 3/15/2017 3/15/2017 3/15/2017 11/16/2016           2:04 PM  2:04 PM  2:04 PM  2:12 PM   Glucose      65 - 99 mg/dL  73     BUN      8 - 27 mg/dL  11     Creatinine      0.76 - 1.27 mg/dL  0.87     GFR est non-AA      >59 mL/min/1.73  80     GFR est AA      >59 mL/min/1.73  93     BUN/Creatinine ratio      10 - 22  13     Sodium      134 - 144 mmol/L  139     Potassium      3.5 - 5.2 mmol/L  5.0     Chloride      96 - 106 mmol/L  100     CO2      18 - 29 mmol/L  24     Calcium      8.6 - 10.2 mg/dL  9.5     Cholesterol, total      100 - 199 mg/dL       Triglyceride      0 - 149 mg/dL       HDL Cholesterol      >39 mg/dL       VLDL, calculated      5 - 40 mg/dL       LDL, calculated      0 - 99 mg/dL       Creatinine, urine      Not Estab. mg/dL    70.5   Microalbumin, urine      Not Estab. ug/mL    21.9   Microalbumin/Creat. Ratio      0.0 - 30.0 mg/g creat    31.1 (H)   Hemoglobin A1c, (calculated)      4.8 - 5.6 %   6.2 (H)    Estimated average glucose      mg/dL   131    Magnesium      1.6 - 2.3 mg/dL 1.2 (L)        Component      Latest Ref Rng & Units 11/16/2016           2:12 PM   Glucose      65 - 99 mg/dL    BUN      8 - 27 mg/dL    Creatinine      0.76 - 1.27 mg/dL    GFR est non-AA      >59 mL/min/1.73    GFR est AA      >59 mL/min/1.73    BUN/Creatinine ratio      10 - 22    Sodium      134 - 144 mmol/L    Potassium      3.5 - 5.2 mmol/L    Chloride      96 - 106 mmol/L    CO2      18 - 29 mmol/L    Calcium      8.6 - 10.2 mg/dL    Cholesterol, total      100 - 199 mg/dL 198   Triglyceride      0 - 149 mg/dL 152 (H)   HDL Cholesterol      >39 mg/dL 43   VLDL, calculated      5 - 40 mg/dL 30   LDL, calculated      0 - 99 mg/dL 125 (H)       Assessment/Plan:   1. Type 2 diabetes mellitus without complication, with long-term current use of insulin ([address]   Appears well-controlled.   Continue Levemir 55 units, Bydureon, metformin   As hemoglobin A1c was rather low last time and he is taking insulin, recommended he be very vigilant about checking glucoses especially if he has any symptoms out of the ordinary. 2. Gastroesophageal reflux disease, esophagitis presence not specified   Taking Prevacid 15 mg daily  Recommend famotidine 20 mg twice daily, then very gradual taper of Prevacid over several weeks. See directions below    3. Hypomagnesemia   Suspect this is primarily due to PPI use  See below. Hopefully he can taper off of the Prevacid  Continue magnesium supplementation    4. Nocturia   He has not responded well to finasteride or Cialis. He continues to have problematic symptoms-awakening 5-6 times per night to use the bathroom. He is very tired during the day on account of this. Recommend he see urology for further evaluation and treatment. .     5. Essential hypertension   Continue amlodipine/olmesartan. Well-controlled      Patient Instructions   Diabetes type II. Continue metformin   Continue Bydureon 2 mg weekly  Continue Latnus 55 units     Continue dietary efforts. **check glucose when you feel very tired. Blood pressure:  Continue amlodipine + telmisartan - > take at bedtime    Low magnesium:  Will repeat  Continue taking 800 mg twice daily    Prevacid use  Start famotidine 20 mg twice daily  After taking famotidine for 1 week, then start tapering the Prevacid:  - recommend skipping Wednesdays and Sundays for 2 weeks, then take every other day for 2 weeks, then every 2 day for 2 weeks, then stop. Frequent urination  - recommend seeing urology    Follow-up Disposition:  Return in about 4 months (around 11/12/2017).     Copy sent to:

## 2017-07-12 NOTE — MR AVS SNAPSHOT
Visit Information Date & Time Provider Department Dept. Phone Encounter #  
 7/12/2017 11:30 AM Jayme Viking, [address] [facility]ology [phone] Follow-up Instructions Return in about 4 months (around 11/12/2017). Your Appointments 8/8/2017  8:30 AM  
ROUTINE CARE with Denzel Singh MD  
[facility] at [address] [facility]) Appt Note: [address] [facility] [phone]  
[facility] Upcoming Health Maintenance Date Due  
 EYE EXAM RETINAL OR DILATED Q1 8/15/1944 GLAUCOMA SCREENING Q2Y 8/15/1999 INFLUENZA AGE 9 TO ADULT 8/1/2017 HEMOGLOBIN A1C Q6M 9/15/2017 MICROALBUMIN Q1 11/16/2017 LIPID PANEL Q1 11/16/2017 FOOT EXAM Q1 3/15/2018 MEDICARE YEARLY EXAM 3/21/2018 Pneumococcal 65+ Low/Medium Risk (2 of 2 - PPSV23) 5/18/2018 DTaP/Tdap/Td series (2 - Td) 5/18/2027 Allergies as of 7/12/2017  Review Complete On: 7/12/2017 By: Jamel Amador LPN Severity Noted Reaction Type Reactions Statins-hmg-coa Reductase Inhibitors  03/23/2016   Side Effect Myalgia Tried several statins. Current Immunizations  Never Reviewed No immunizations on file. Not reviewed this visit You Were Diagnosed With   
  
 Codes Comments Type 2 diabetes mellitus without complication, with long-term current use of insulin ([facility])    -  Primary ICD-10-CM: E11.9, Z79.4 ICD-9-CM: 250.00, V58.67 Gastroesophageal reflux disease, esophagitis presence not specified     ICD-10-CM: K21.9 ICD-9-CM: 530.81 Hypomagnesemia     ICD-10-CM: N36.37 
ICD-9-CM: 275.2 Nocturia     ICD-10-CM: R35.1 ICD-9-CM: 788.43 Vitals BP Pulse Height(growth percentile) Weight(growth percentile) BMI Smoking Status  132/65 (BP 1 Location: Left arm, BP Patient Position: Sitting) 72 5' 8.5\" (1.74 m) 278 lb 9.6 oz (126.4 kg) 41.74 kg/m2 Former Smoker Vitals History BMI and BSA Data Body Mass Index Body Surface Area 41.74 kg/m 2 2.47 m 2 Preferred Pharmacy Pharmacy Name Phone [address] [phone] Your Updated Medication List  
  
   
This list is accurate as of: 7/12/17 12:45 PM.  Always use your most recent med list.  
  
  
  
  
 COUMADIN 4 mg tablet Generic drug:  warfarin Take 2 Tabs by mouth daily. BRAND MEDICALLY NECESSARY exenatide microspheres 2 mg/0.65 mL Pnij Commonly known as:  BYDUREON  
2 mg by SubCUTAneous route every seven (7) days. For diabetes  
  
 famotidine 20 mg tablet Commonly known as:  PEPCID Take 1 Tab by mouth two (2) times a day. For stomach acid. finasteride 5 mg tablet Commonly known as:  PROSCAR Take 1 Tab by mouth daily. Indications: SYMPTOMATIC BENIGN PROSTATIC HYPERPLASIA FLEXERIL 10 mg tablet Generic drug:  cyclobenzaprine Take  by mouth three (3) times daily as needed for Muscle Spasm(s). insulin glargine 100 unit/mL (3 mL) Inpn Commonly known as:  LANTUS SOLOSTAR  
55 Units by SubCUTAneous route daily. Insulin Needles (Disposable) 31 gauge x 3/16\" Ndle Commonly known as:  BD INSULIN PEN NEEDLE UF MINI  
1 each by SubCUTAneous route daily. Linda Pen Needle 32 gauge x 5/32\" Ndle Generic drug:  Insulin Needles (Disposable)  
  
 magnesium oxide 400 mg tablet Commonly known as:  MAG-OX Take 1 Tab by mouth four (4) times daily. For low magnesium  
  
 metFORMIN 1,000 mg tablet Commonly known as:  GLUCOPHAGE Take 1 Tab by mouth two (2) times a day. OTHER Take 150 mg by mouth two (2) times a day. Vitacost Benfotiamine-Take 2 tab BID  
  
 quiNINE 324 mg capsule Take 324 mg by mouth nightly as needed. tadalafil 5 mg tablet Commonly known as:  CIALIS  
TAKE 1 TABLET DAILY Telmisartan-amLODIPine 80-5 mg Tab TAKE 1 TABLET NIGHTLY FOR BLOOD PRESSURE  
  
 triamcinolone acetonide 0.5 % ointment Commonly known as:  KENALOG Apply  to affected area two (2) times a day. use thin layer VITAMIN B-12 1,000 mcg tablet Generic drug:  cyanocobalamin Take 1,000 mcg by mouth daily. Nature Made VITAMIN D3 5,000 unit Tab tablet Generic drug:  cholecalciferol (VITAMIN D3) Take  by mouth daily. Nature Made Prescriptions Sent to Pharmacy Refills  
 famotidine (PEPCID) 20 mg tablet 3 Sig: Take 1 Tab by mouth two (2) times a day. For stomach acid. Class: Normal  
 Pharmacy: [address], [address] [place] [address] #: [phone] Route: Oral  
  
We Performed the Following HEMOGLOBIN A1C WITH EAG [51709 CPT(R)] MAGNESIUM [id number] CPT(R)] METABOLIC PANEL, BASIC [47184 CPT(R)] MA COLLECTION VENOUS BLOOD,VENIPUNCTURE [id number] CPT(R)] MA HANDLG&/OR CONVEY OF SPEC FOR TR OFFICE TO LAB [38145 CPT(R)] Follow-up Instructions Return in about 4 months (around 11/12/2017). Patient Instructions Diabetes type II. Continue metformin Continue Bydureon 2 mg weekly Continue Latnus 55 units Continue dietary efforts. **check glucose when you feel very tired. Blood pressure: 
Continue amlodipine + telmisartan - > take at bedtime Low magnesium: 
Will repeat Continue taking 800 mg twice daily Prevacid use Start famotidine 20 mg twice daily After taking famotidine for 1 week, then start tapering the Prevacid: 
- recommend skipping Wednesdays and Sundays for 2 weeks, then take every other day for 2 weeks, then every 2 day for 2 weeks, then stop. Frequent urination 
- recommend seeing urology Introducing [facility]!    
 Eleno Ortiz introduces [facility] patient portal. Now you can access parts of your medical record, email your doctor's office, and request medication refills online. 1. In your internet browser, go to [email or web address] 2. Click on the First Time User? Click Here link in the Sign In box. You will see the New Member Sign Up page. 3. Enter your [employer] Access Code exactly as it appears below. You will not need to use this code after youve completed the sign-up process. If you do not sign up before the expiration date, you must request a new code. · [employer] Access Code: [email or web address] Expires: 9/18/2017 11:08 AM 
 
4. Enter the last four digits of your Social Security Number (xxxx) and Date of Birth (mm/dd/yyyy) as indicated and click Submit. You will be taken to the next sign-up page. 5. Create a [employer] ID. This will be your [employer] login ID and cannot be changed, so think of one that is secure and easy to remember. 6. Create a [employer] password. You can change your password at any time. 7. Enter your Password Reset Question and Answer. This can be used at a later time if you forget your password. 8. Enter your e-mail address. You will receive e-mail notification when new information is available in [address] 9. Click Sign Up. You can now view and download portions of your medical record. 10. Click the Download Summary menu link to download a portable copy of your medical information. If you have questions, please visit the Frequently Asked Questions section of the [employer] website. Remember, [employer] is NOT to be used for urgent needs. For medical emergencies, dial 911. Now available from your iPhone and Android! Please provide this summary of care documentation to your next provider. Your primary care clinician is listed as Barbara Spicer. If you have any questions after today's visit, please call [phone].

## 2017-07-12 NOTE — PATIENT INSTRUCTIONS
Diabetes type II. Continue metformin   Continue Bydureon 2 mg weekly  Continue Latnus 55 units     Continue dietary efforts. **check glucose when you feel very tired. Blood pressure:  Continue amlodipine + telmisartan - > take at bedtime    Low magnesium:  Will repeat  Continue taking 800 mg twice daily    Prevacid use  Start famotidine 20 mg twice daily  After taking famotidine for 1 week, then start tapering the Prevacid:  - recommend skipping Wednesdays and Sundays for 2 weeks, then take every other day for 2 weeks, then every 2 day for 2 weeks, then stop.     Frequent urination  - recommend seeing urology

## 2017-07-13 LAB
BUN SERPL-MCNC: 15 MG/DL (ref 8–27)
BUN/CREAT SERPL: 16 (ref 10–24)
CALCIUM SERPL-MCNC: 9.2 MG/DL (ref 8.6–10.2)
CHLORIDE SERPL-SCNC: 101 MMOL/L (ref 96–106)
CO2 SERPL-SCNC: 22 MMOL/L (ref 18–29)
CREAT SERPL-MCNC: 0.95 MG/DL (ref 0.76–1.27)
EST. AVERAGE GLUCOSE BLD GHB EST-MCNC: 123 MG/DL
GLUCOSE SERPL-MCNC: 84 MG/DL (ref 65–99)
HBA1C MFR BLD: 5.9 % (ref 4.8–5.6)
MAGNESIUM SERPL-MCNC: 1.5 MG/DL (ref 1.6–2.3)
POTASSIUM SERPL-SCNC: 4.8 MMOL/L (ref 3.5–5.2)
SODIUM SERPL-SCNC: 136 MMOL/L (ref 134–144)

## 2017-08-08 ENCOUNTER — OFFICE VISIT (OUTPATIENT)
Dept: FAMILY MEDICINE CLINIC | Age: 82
End: 2017-08-08

## 2017-08-08 VITALS
RESPIRATION RATE: 18 BRPM | HEIGHT: 69 IN | WEIGHT: 280.2 LBS | OXYGEN SATURATION: 97 % | DIASTOLIC BLOOD PRESSURE: 64 MMHG | BODY MASS INDEX: 41.5 KG/M2 | TEMPERATURE: 97.2 F | HEART RATE: 80 BPM | SYSTOLIC BLOOD PRESSURE: 132 MMHG

## 2017-08-08 DIAGNOSIS — E11.9 TYPE 2 DIABETES MELLITUS WITHOUT COMPLICATION, WITH LONG-TERM CURRENT USE OF INSULIN (HCC): ICD-10-CM

## 2017-08-08 DIAGNOSIS — Z51.81 ENCOUNTER FOR MONITORING COUMADIN THERAPY: ICD-10-CM

## 2017-08-08 DIAGNOSIS — Z86.718 HISTORY OF VENOUS THROMBOEMBOLISM: ICD-10-CM

## 2017-08-08 DIAGNOSIS — Z79.4 TYPE 2 DIABETES MELLITUS WITHOUT COMPLICATION, WITH LONG-TERM CURRENT USE OF INSULIN (HCC): ICD-10-CM

## 2017-08-08 DIAGNOSIS — R35.1 NOCTURIA: ICD-10-CM

## 2017-08-08 DIAGNOSIS — K21.9 GASTROESOPHAGEAL REFLUX DISEASE, ESOPHAGITIS PRESENCE NOT SPECIFIED: ICD-10-CM

## 2017-08-08 DIAGNOSIS — J30.1 SEASONAL ALLERGIC RHINITIS DUE TO POLLEN: ICD-10-CM

## 2017-08-08 DIAGNOSIS — D68.59 HYPERCOAGULABLE STATE (HCC): Primary | ICD-10-CM

## 2017-08-08 DIAGNOSIS — Z79.01 ENCOUNTER FOR MONITORING COUMADIN THERAPY: ICD-10-CM

## 2017-08-08 LAB
INR BLD: 2.8
PT POC: NORMAL SECONDS
VALID INTERNAL CONTROL?: YES

## 2017-08-08 RX ORDER — CETIRIZINE HCL 10 MG
10 TABLET ORAL DAILY
Qty: 30 TAB | Refills: 5
Start: 2017-08-08 | End: 2017-11-13 | Stop reason: ALTCHOICE

## 2017-08-08 RX ORDER — CALC/MAG/B COMPLEX/D3/HERB 61
TABLET ORAL
COMMUNITY
Start: 2017-06-30 | End: 2017-11-13 | Stop reason: ALTCHOICE

## 2017-08-08 NOTE — PROGRESS NOTES
Subjective:     Chief Complaint   Patient presents with    Anticoagulation      He  is a 80 y.o. male who presents for evaluation of:  Here for INR check. 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 See anti-coag tab for history and dosing with plan. Notes reviewed from Dr. Alesha Davis and nocturia sx remain an issue even on Cialis and Proscar - does not have sig daytime sx. At night, he wakes up 4-5 x to urinate. He is already set up with Urology for an appt tomorrow. Pmhx sig for DM2 (seeing Dr. 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 Diabetes has been well controlled with Lantus, Metformin, and Bydureon. GERD was controlled on Prevacid but he is trying to go off of this again and sx are flaring but tolerable. Rarely uses pain meds. ROS  Gen - no fever/chills  Resp - coughing up some phlegm recently, no dyspnea, chest pain, or infectious sx. CV - no chest pain or DIAZ  Rest per HPI    Past Medical History:   Diagnosis Date    Back injury 1994    Diabetes ([address]     Muscle cramps     Reflux     Thromboembolus ([address]      Past Surgical History:   Procedure Laterality Date    HX ORTHOPAEDIC Left     Crushed/left index finger amputee    HX OTHER SURGICAL      hand surgery, heal spur     Current Outpatient Prescriptions on File Prior to Visit   Medication Sig Dispense Refill    famotidine (PEPCID) 20 mg tablet Take 1 Tab by mouth two (2) times a day. For stomach acid. 180 Tab 3    ALTAGRACIA PEN NEEDLE 32 gauge x 5/32\" ndle       COUMADIN 4 mg tablet Take 2 Tabs by mouth daily. BRAND MEDICALLY NECESSARY 180 Tab 0    Telmisartan-amLODIPine 80-5 mg tab TAKE 1 TABLET NIGHTLY FOR BLOOD PRESSURE 90 Tab 1    metFORMIN (GLUCOPHAGE) 1,000 mg tablet Take 1 Tab by mouth two (2) times a day. 180 Tab 1    triamcinolone acetonide (KENALOG) 0.5 % ointment Apply  to affected area two (2) times a day.  use thin layer 30 g 0    cholecalciferol, VITAMIN D3, (VITAMIN D3) 5,000 unit tab tablet Take  by mouth daily. Nature Made      cyanocobalamin (VITAMIN B-12) 1,000 mcg tablet Take 1,000 mcg by mouth daily. Nature Made      finasteride (PROSCAR) 5 mg tablet Take 1 Tab by mouth daily. Indications: SYMPTOMATIC BENIGN PROSTATIC HYPERPLASIA 30 Tab 5    tadalafil (CIALIS) 5 mg tablet TAKE 1 TABLET DAILY 90 Tab 3    exenatide microspheres (BYDUREON) 2 mg/0.65 mL pnij 2 mg by SubCUTAneous route every seven (7) days. For diabetes 8 mL 3    magnesium oxide (MAG-OX) 400 mg tablet Take 1 Tab by mouth four (4) times daily. For low magnesium (Patient taking differently: Take 1,600 mg by mouth daily. For low magnesium) 360 Tab 3    insulin glargine (LANTUS SOLOSTAR) 100 unit/mL (3 mL) pen 55 Units by SubCUTAneous route daily. 4 Package 4    Insulin Needles, Disposable, (BD INSULIN PEN NEEDLE UF MINI) 31 x 3/16 \" ndle 1 each by SubCUTAneous route daily. 100 each 3    OTHER Take 150 mg by mouth two (2) times a day. Vitacost Benfotiamine-Take 2 tab BID      cyclobenzaprine (FLEXERIL) 10 mg tablet Take  by mouth three (3) times daily as needed for Muscle Spasm(s).  quiNINE 324 mg capsule Take 324 mg by mouth nightly as needed. No current facility-administered medications on file prior to visit.          Objective:     Vitals:    08/08/17 0829   BP: 132/64   Pulse: 80   Resp: 18   Temp: 97.2 °F (36.2 °C)   TempSrc: Oral   SpO2: 97%   Weight: 280 lb 3.2 oz (127.1 kg)   Height: 5' 8.5\" (1.74 m)     Physical Examination:  General appearance - alert, well appearing, and in no distress  Eyes -sclera anicteric  Nose - mild turbinate swelling  Mouth - nml oropharynx  Neck - supple, no significant adenopathy, no thyromegaly  Chest - clear to auscultation, no wheezes, rales or rhonchi, symmetric air entry  Heart - normal rate, regular rhythm, normal S1, S2, no murmurs, rubs, clicks or gallops  Neurological - alert, oriented, no focal findings or movement disorder noted  Extr - trace edema  Skin - LLE rash nearly resolved    Assessment/ Plan:   Diagnoses and all orders for this visit:    1. Hypercoagulable state ([address]  2. History of venous thromboembolism  3. Encounter for monitoring coumadin therapy  - INR therapeutic and has been on checks every 6 weeks for years so will ct this. -     AMB POC PT/INR     4. Nocturia - to see Urology tomorrow    5. Gastroesophageal reflux disease, esophagitis presence not specified - controlled, stable off Prevacid so far    6. Type 2 diabetes mellitus without complication, with long-term current use of insulin (HCC) - controlled, has some neuropathy    7. Seasonal allergic rhinitis due to pollen - trial of Zyrtec, no infectious sx at this point. -     cetirizine (ZYRTEC) 10 mg tablet; Take 1 Tab by mouth daily. I have discussed the diagnosis with the patient and the intended plan as seen in the above orders. The patient has received an after-visit summary and questions were answered concerning future plans. I have discussed medication side effects and warnings with the patient as well. The patient verbalizes understanding and agreement with the plan. Follow-up Disposition:  Return in about 6 weeks (around 9/19/2017).

## 2017-08-08 NOTE — MR AVS SNAPSHOT
Visit Information Date & Time Provider Department Dept. Phone Encounter #  
 8/8/2017  8:30 AM Herb Iraheta MD [facility] at [address] [id number] Follow-up Instructions Return in about 6 weeks (around 9/19/2017). Your Appointments 11/15/2017 11:30 AM  
Follow Up with MD Everardo [facility] [address] [facility]) Appt Note: 4 month f/u   Diabetes [address] [phone] [facility] Upcoming Health Maintenance Date Due INFLUENZA AGE 9 TO ADULT 8/1/2017 MICROALBUMIN Q1 11/16/2017 LIPID PANEL Q1 11/16/2017 HEMOGLOBIN A1C Q6M 1/12/2018 FOOT EXAM Q1 3/15/2018 MEDICARE YEARLY EXAM 3/21/2018 Pneumococcal 65+ Low/Medium Risk (2 of 2 - PPSV23) 5/18/2018 EYE EXAM RETINAL OR DILATED Q1 8/3/2018 GLAUCOMA SCREENING Q2Y 8/3/2019 DTaP/Tdap/Td series (2 - Td) 5/18/2027 Allergies as of 8/8/2017  Review Complete On: 8/8/2017 By: Zita Huerta LPN Severity Noted Reaction Type Reactions Statins-hmg-coa Reductase Inhibitors  03/23/2016   Side Effect Myalgia Tried several statins. Current Immunizations  Never Reviewed No immunizations on file. Not reviewed this visit You Were Diagnosed With   
  
 Codes Comments [facility]coagulable state [facility]    -  Primary ICD-10-CM: P61.12 
ICD-9-CM: 289.81 Encounter for monitoring coumadin therapy     ICD-10-CM: Z51.81, Z79.01 
ICD-9-CM: V58.83, V58.61 History of venous thromboembolism     ICD-10-CM: Z86.718 ICD-9-CM: V12.51 Nocturia     ICD-10-CM: R35.1 ICD-9-CM: 788.43 Gastroesophageal reflux disease, esophagitis presence not specified     ICD-10-CM: K21.9 ICD-9-CM: 530.81 Type 2 diabetes mellitus without complication, with long-term current use of insulin (HCC)     ICD-10-CM: E11.9, Z79.4 ICD-9-CM: 250.00, V58.67 Vitals BP Pulse Temp Resp Height(growth percentile) Weight(growth percentile) 132/64 (BP 1 Location: Left arm, BP Patient Position: Sitting) 80 97.2 °F (36.2 °C) (Oral) 18 5' 8.5\" (1.74 m) 280 lb 3.2 oz (127.1 kg) SpO2 BMI Smoking Status 97% 41.98 kg/m2 Former Smoker BMI and BSA Data Body Mass Index Body Surface Area 41.98 kg/m 2 2.48 m 2 Preferred Pharmacy Pharmacy Name Phone [address] [facility] [phone] Your Updated Medication List  
  
   
This list is accurate as of: 8/8/17  8:56 AM.  Always use your most recent med list.  
  
  
  
  
 COUMADIN 4 mg tablet Generic drug:  warfarin Take 2 Tabs by mouth daily. BRAND MEDICALLY NECESSARY exenatide microspheres 2 mg/0.65 mL Pnij Commonly known as:  BYDUREON  
2 mg by SubCUTAneous route every seven (7) days. For diabetes  
  
 famotidine 20 mg tablet Commonly known as:  PEPCID Take 1 Tab by mouth two (2) times a day. For stomach acid. finasteride 5 mg tablet Commonly known as:  PROSCAR Take 1 Tab by mouth daily. Indications: SYMPTOMATIC BENIGN PROSTATIC HYPERPLASIA FLEXERIL 10 mg tablet Generic drug:  cyclobenzaprine Take  by mouth three (3) times daily as needed for Muscle Spasm(s). insulin glargine 100 unit/mL (3 mL) Inpn Commonly known as:  LANTUS SOLOSTAR  
55 Units by SubCUTAneous route daily. Insulin Needles (Disposable) 31 gauge x 3/16\" Ndle Commonly known as:  BD INSULIN PEN NEEDLE UF MINI  
1 each by SubCUTAneous route daily. Linda Pen Needle 32 gauge x 5/32\" Ndle Generic drug:  Insulin Needles (Disposable)  
  
 lansoprazole 15 mg capsule Commonly known as:  PREVACID  
  
 magnesium oxide 400 mg tablet Commonly known as:  MAG-OX Take 1 Tab by mouth four (4) times daily. For low magnesium  
  
 metFORMIN 1,000 mg tablet Commonly known as:  GLUCOPHAGE Take 1 Tab by mouth two (2) times a day. OTHER Take 150 mg by mouth two (2) times a day. Vitacost Benfotiamine-Take 2 tab BID  
  
 quiNINE 324 mg capsule Take 324 mg by mouth nightly as needed. tadalafil 5 mg tablet Commonly known as:  CIALIS  
TAKE 1 TABLET DAILY Telmisartan-amLODIPine 80-5 mg Tab TAKE 1 TABLET NIGHTLY FOR BLOOD PRESSURE  
  
 triamcinolone acetonide 0.5 % ointment Commonly known as:  KENALOG Apply  to affected area two (2) times a day. use thin layer VITAMIN B-12 1,000 mcg tablet Generic drug:  cyanocobalamin Take 1,000 mcg by mouth daily. Nature Made VITAMIN D3 5,000 unit Tab tablet Generic drug:  cholecalciferol (VITAMIN D3) Take  by mouth daily. Nature Made August 2017 Details Sun Mon Tue Wed Thu Fri Sat  
    1  
  
  
  
   2  
  
  
  
   3  
  
  
  
   4  
  
  
  
   5  
  
  
  
  
  6  
  
  
  
   7  
  
  
  
   8  
  
8 mg See details 9  
  
8 mg  
  
   10  
  
8 mg  
  
   11  
  
8 mg  
  
   12  
  
8 mg  
  
  
  13  
  
8 mg  
  
   14  
  
8 mg  
  
   15  
  
8 mg  
  
   16  
  
8 mg  
  
   17  
  
8 mg  
  
   18  
  
8 mg  
  
   19  
  
8 mg  
  
  
  20  
  
8 mg  
  
   21  
  
8 mg  
  
   22  
  
8 mg  
  
   23  
  
8 mg  
  
   24  
  
8 mg  
  
   25  
  
8 mg  
  
   26  
  
8 mg  
  
  
  27  
  
8 mg  
  
   28  
  
8 mg  
  
   29  
  
8 mg  
  
   30  
  
8 mg  
  
   31  
  
8 mg Date Details 08/08 This INR check INR: 2.8 How to take your warfarin dose To take:  8 mg Take two of the 4 mg tablets. September 2017 [facility]  
  
8 mg 2  
  
8 mg 3  
  
8 mg 4  
  
8 mg 5  
  
8 mg 6  
  
8 mg 7  
  
8 mg 8  
  
8 mg 9  
  
8 mg  
  
  
  10  
  
8 mg  
  
   11  
  
8 mg  
  
   12  
  
8 mg  
  
   13 8 mg  
  
   14  
  
8 mg  
  
   15  
  
8 mg  
  
   16  
  
8 mg  
  
  
  17  
  
8 mg  
  
   18  
  
8 mg  
  
   19  
  
8 mg  
  
   20  
  
  
  
   21  
  
  
  
   22  
  
  
  
   23  
  
  
  
  
  24  
  
  
  
   25  
  
  
  
   26  
  
  
  
   27  
  
  
  
   28  
  
  
  
   29  
  
  
  
   30  
  
  
  
  
 Date Details No additional details Date of next INR:  9/19/2017 How to take your warfarin dose To take:  8 mg Take two of the 4 mg tablets. We Performed the Following AMB POC PT/INR [07409 CPT(R)] Follow-up Instructions Return in about 6 weeks (around 9/19/2017). Introducing [facility]! Delmy Graham introduces [employer] patient portal. Now you can access parts of your medical record, email your doctor's office, and request medication refills online. 1. In your internet browser, go to [email or web address] 2. Click on the First Time User? Click Here link in the Sign In box. You will see the New Member Sign Up page. 3. Enter your [employer] Access Code exactly as it appears below. You will not need to use this code after youve completed the sign-up process. If you do not sign up before the expiration date, you must request a new code. · [employer] Access Code: [email or web address] Expires: 9/18/2017 11:08 AM 
 
4. Enter the last four digits of your Social Security Number (xxxx) and Date of Birth (mm/dd/yyyy) as indicated and click Submit. You will be taken to the next sign-up page. 5. Create a [employer] ID. This will be your [employer] login ID and cannot be changed, so think of one that is secure and easy to remember. 6. Create a [employer] password. You can change your password at any time. 7. Enter your Password Reset Question and Answer. This can be used at a later time if you forget your password. 8. Enter your e-mail address. You will receive e-mail notification when new information is available in [address] 9. Click Sign Up. You can now view and download portions of your medical record. 10. Click the Download Summary menu link to download a portable copy of your medical information. If you have questions, please visit the Frequently Asked Questions section of the [employer] website. Remember, [employer] is NOT to be used for urgent needs. For medical emergencies, dial 911. Now available from your iPhone and Android! Please provide this summary of care documentation to your next provider. Your primary care clinician is listed as Herb Iraheta. If you have any questions after today's visit, please call [phone].

## 2017-08-20 DIAGNOSIS — N40.1 BENIGN PROSTATIC HYPERPLASIA WITH LOWER URINARY TRACT SYMPTOMS, UNSPECIFIED MORPHOLOGY: ICD-10-CM

## 2017-08-21 RX ORDER — FINASTERIDE 5 MG/1
TABLET, FILM COATED ORAL
Qty: 30 TAB | Refills: 4 | Status: SHIPPED | OUTPATIENT
Start: 2017-08-21 | End: 2018-01-23 | Stop reason: SDUPTHER

## 2017-10-05 ENCOUNTER — OFFICE VISIT (OUTPATIENT)
Dept: FAMILY MEDICINE CLINIC | Age: 82
End: 2017-10-05

## 2017-10-05 VITALS
WEIGHT: 279 LBS | BODY MASS INDEX: 41.32 KG/M2 | HEIGHT: 69 IN | SYSTOLIC BLOOD PRESSURE: 136 MMHG | RESPIRATION RATE: 18 BRPM | OXYGEN SATURATION: 97 % | HEART RATE: 85 BPM | DIASTOLIC BLOOD PRESSURE: 68 MMHG | TEMPERATURE: 97.3 F

## 2017-10-05 DIAGNOSIS — Z51.81 ENCOUNTER FOR MONITORING COUMADIN THERAPY: ICD-10-CM

## 2017-10-05 DIAGNOSIS — Z23 ENCOUNTER FOR IMMUNIZATION: ICD-10-CM

## 2017-10-05 DIAGNOSIS — D68.59 HYPERCOAGULABLE STATE (HCC): Primary | ICD-10-CM

## 2017-10-05 DIAGNOSIS — Z79.01 ENCOUNTER FOR MONITORING COUMADIN THERAPY: ICD-10-CM

## 2017-10-05 DIAGNOSIS — Z86.718 HISTORY OF VENOUS THROMBOEMBOLISM: ICD-10-CM

## 2017-10-05 LAB
INR BLD: 3.3
PT POC: NORMAL SECONDS
VALID INTERNAL CONTROL?: YES

## 2017-10-05 NOTE — PROGRESS NOTES
Subjective:     Chief Complaint   Patient presents with    Follow-up     PT/INR      He  is a 80 y.o. male who presents for evaluation of:  Here for INR check. 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 See anti-coag tab for history and dosing with plan. Pmhx sig for DM2 (seeing Dr. 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 Diabetes has been well controlled with Lantus, Metformin, and Bydureon. GERD was controlled on Prevacid but he is trying to go off of this again and sx are flaring but tolerable. Rarely uses pain meds. ROS per HPI    Past Medical History:   Diagnosis Date    Back injury 1994    Diabetes ([address]     Muscle cramps     Reflux     Thromboembolus (HCC)      Past Surgical History:   Procedure Laterality Date    HX ORTHOPAEDIC Left     Crushed/left index finger amputee    HX OTHER SURGICAL      hand surgery, heal spur     Current Outpatient Prescriptions on File Prior to Visit   Medication Sig Dispense Refill    finasteride (PROSCAR) 5 mg tablet TAKE 1 TABLET DAILY FOR SYMPTOMATIC BENIGN PROSTATIC HYPERPLASIA 30 Tab 4    ALTAGRACIA PEN NEEDLE 32 gauge x 5/32\" ndle       COUMADIN 4 mg tablet Take 2 Tabs by mouth daily. BRAND MEDICALLY NECESSARY 180 Tab 0    Telmisartan-amLODIPine 80-5 mg tab TAKE 1 TABLET NIGHTLY FOR BLOOD PRESSURE 90 Tab 1    metFORMIN (GLUCOPHAGE) 1,000 mg tablet Take 1 Tab by mouth two (2) times a day. 180 Tab 1    cholecalciferol, VITAMIN D3, (VITAMIN D3) 5,000 unit tab tablet Take  by mouth daily. Nature Made      cyanocobalamin (VITAMIN B-12) 1,000 mcg tablet Take 1,000 mcg by mouth daily. Nature Made      exenatide microspheres (BYDUREON) 2 mg/0.65 mL pnij 2 mg by SubCUTAneous route every seven (7) days. For diabetes 8 mL 3    magnesium oxide (MAG-OX) 400 mg tablet Take 1 Tab by mouth four (4) times daily. For low magnesium (Patient taking differently: Take 1,600 mg by mouth daily.  For low magnesium) 360 Tab 3    insulin glargine (LANTUS SOLOSTAR) 100 unit/mL (3 mL) pen 55 Units by SubCUTAneous route daily. 4 Package 4    Insulin Needles, Disposable, (BD INSULIN PEN NEEDLE UF MINI) 31 x 3/16 \" ndle 1 each by SubCUTAneous route daily. 100 each 3    cyclobenzaprine (FLEXERIL) 10 mg tablet Take  by mouth three (3) times daily as needed for Muscle Spasm(s).  lansoprazole (PREVACID) 15 mg capsule       cetirizine (ZYRTEC) 10 mg tablet Take 1 Tab by mouth daily. 30 Tab 5    famotidine (PEPCID) 20 mg tablet Take 1 Tab by mouth two (2) times a day. For stomach acid. 180 Tab 3    tadalafil (CIALIS) 5 mg tablet TAKE 1 TABLET DAILY 90 Tab 3    quiNINE 324 mg capsule Take 324 mg by mouth nightly as needed. No current facility-administered medications on file prior to visit. Objective:     Vitals:    10/05/17 1109 10/05/17 1126   BP: 148/61 136/68   Pulse: 85    Resp: 18    Temp: 97.3 °F (36.3 °C)    TempSrc: Oral    SpO2: 97%    Weight: 279 lb (126.6 kg)    Height: 5' 8.5\" (1.74 m)      Physical Examination:  General appearance - alert, well appearing, and in no distress  Eyes -sclera anicteric  Neck - supple, no significant adenopathy, no thyromegaly  Chest - clear to auscultation, no wheezes, rales or rhonchi, symmetric air entry  Heart - normal rate, regular rhythm, normal S1, S2, no murmurs, rubs, clicks or gallops  Neurological - alert, oriented, no focal findings or movement disorder noted  Extr - trace edema    Assessment/ Plan:   Diagnoses and all orders for this visit:    1. Hypercoagulable state ([address]  2. History of venous thromboembolism  3. Encounter for monitoring coumadin therapy  - INR slightly supratherapeutic and has been on checks every 6 weeks for years. Bringing back in 4 weeks to ensure this does not escalate. -     AMB POC PT/INR     I have discussed the diagnosis with the patient and the intended plan as seen in the above orders.   The patient has received an after-visit summary and questions were answered concerning future plans. I have discussed medication side effects and warnings with the patient as well. The patient verbalizes understanding and agreement with the plan. Follow-up Disposition:  Return in about 4 weeks (around 11/2/2017), or if symptoms worsen or fail to improve.

## 2017-10-05 NOTE — MR AVS SNAPSHOT
Visit Information Date & Time Provider Department Dept. Phone Encounter #  
 10/5/2017 10:30 AM Rodrigo Olivia MD [facility] at [address] [id number] Follow-up Instructions Return in about 4 weeks (around 11/2/2017), or if symptoms worsen or fail to improve. Your Appointments 11/15/2017 11:30 AM  
Follow Up with Betito Martínez MD  
[place] [facility]) Appt Note: 4 month f/u   Diabetes [address] [phone] [address] [facility] Upcoming Health Maintenance Date Due INFLUENZA AGE 9 TO ADULT 8/1/2017 MICROALBUMIN Q1 11/16/2017 LIPID PANEL Q1 11/16/2017 HEMOGLOBIN A1C Q6M 1/12/2018 FOOT EXAM Q1 3/15/2018 MEDICARE YEARLY EXAM 3/21/2018 Pneumococcal 65+ Low/Medium Risk (2 of 2 - PPSV23) 5/18/2018 EYE EXAM RETINAL OR DILATED Q1 8/3/2018 GLAUCOMA SCREENING Q2Y 8/3/2019 DTaP/Tdap/Td series (2 - Td) 5/18/2027 Allergies as of 10/5/2017  Review Complete On: 10/5/2017 By: Rodrigo Olivia MD  
  
 Severity Noted Reaction Type Reactions Statins-hmg-coa Reductase Inhibitors  03/23/2016   Side Effect Myalgia Tried several statins. Current Immunizations  Never Reviewed No immunizations on file. Not reviewed this visit You Were Diagnosed With   
  
 Codes Comments [facility]    -  Primary ICD-10-CM: R64.15 
ICD-9-CM: 289.81 Encounter for monitoring coumadin therapy     ICD-10-CM: Z51.81, Z79.01 
ICD-9-CM: V58.83, V58.61 History of venous thromboembolism     ICD-10-CM: Z86.718 ICD-9-CM: V12.51 Vitals BP Pulse Temp Resp Height(growth percentile) Weight(growth percentile) 136/68 85 97.3 °F (36.3 °C) (Oral) 18 5' 8.5\" (1.74 m) 279 lb (126.6 kg) SpO2 BMI Smoking Status 97% 41.8 kg/m2 Former Smoker Vitals History BMI and BSA Data Body Mass Index Body Surface Area  
 41.8 kg/m 2 2.47 m 2 Preferred Pharmacy Pharmacy Name Phone [address] [facility] [phone] Your Updated Medication List  
  
   
This list is accurate as of: 10/5/17 11:43 AM.  Always use your most recent med list.  
  
  
  
  
 cetirizine 10 mg tablet Commonly known as:  ZYRTEC Take 1 Tab by mouth daily. COUMADIN 4 mg tablet Generic drug:  warfarin Take 2 Tabs by mouth daily. BRAND MEDICALLY NECESSARY exenatide microspheres 2 mg/0.65 mL Pnij Commonly known as:  BYDUREON  
2 mg by SubCUTAneous route every seven (7) days. For diabetes  
  
 famotidine 20 mg tablet Commonly known as:  PEPCID Take 1 Tab by mouth two (2) times a day. For stomach acid. finasteride 5 mg tablet Commonly known as:  PROSCAR  
TAKE 1 TABLET DAILY FOR SYMPTOMATIC BENIGN PROSTATIC HYPERPLASIA FLEXERIL 10 mg tablet Generic drug:  cyclobenzaprine Take  by mouth three (3) times daily as needed for Muscle Spasm(s). insulin glargine 100 unit/mL (3 mL) Inpn Commonly known as:  LANTUS SOLOSTAR  
55 Units by SubCUTAneous route daily. Insulin Needles (Disposable) 31 gauge x 3/16\" Ndle Commonly known as:  BD INSULIN PEN NEEDLE UF MINI  
1 each by SubCUTAneous route daily. Linda Pen Needle 32 gauge x 5/32\" Ndle Generic drug:  Insulin Needles (Disposable)  
  
 lansoprazole 15 mg capsule Commonly known as:  PREVACID  
  
 magnesium oxide 400 mg tablet Commonly known as:  MAG-OX Take 1 Tab by mouth four (4) times daily. For low magnesium  
  
 metFORMIN 1,000 mg tablet Commonly known as:  GLUCOPHAGE Take 1 Tab by mouth two (2) times a day. quiNINE 324 mg capsule Take 324 mg by mouth nightly as needed. tadalafil 5 mg tablet Commonly known as:  CIALIS  
TAKE 1 TABLET DAILY Telmisartan-amLODIPine 80-5 mg Tab TAKE 1 TABLET NIGHTLY FOR BLOOD PRESSURE  
  
 VITAMIN B-12 1,000 mcg tablet Generic drug:  cyanocobalamin Take 1,000 mcg by mouth daily. Nature Made VITAMIN D3 5,000 unit Tab tablet Generic drug:  cholecalciferol (VITAMIN D3) Take  by mouth daily. Nature Made October 2017 Details Amilcar Thakur Tue Wed Thu Fri Sat  
  1  
  
  
  
   2  
  
  
  
   3  
  
  
  
   4  
  
  
  
   5  
  
8 mg See details 6  
  
8 mg 7  
  
8 mg 8  
  
8 mg 9  
  
8 mg  
  
   10  
  
8 mg  
  
   11  
  
8 mg  
  
   12  
  
8 mg  
  
   13  
  
8 mg  
  
   14  
  
8 mg  
  
  
  15  
  
8 mg  
  
   16  
  
8 mg  
  
   17  
  
8 mg  
  
   18  
  
8 mg  
  
   19  
  
8 mg  
  
   20  
  
8 mg  
  
   21  
  
8 mg  
  
  
  22  
  
8 mg  
  
   23  
  
8 mg  
  
   24  
  
8 mg  
  
   25  
  
8 mg  
  
   26  
  
8 mg  
  
   27  
  
8 mg  
  
   28  
  
8 mg  
  
  
  29  
  
8 mg  
  
   30  
  
8 mg  
  
   31  
  
8 mg Date Details 10/05 This INR check INR: 3.3 How to take your warfarin dose To take:  8 mg Take two of the 4 mg tablets. November 2017 Details Amilcar Thakur Tue Wed Thu Fri Sat 1  
  
8 mg 2  
  
8 mg  
  
   3  
  
  
  
   4  
  
  
  
  
  5  
  
  
  
   6  
  
  
  
   7  
  
  
  
   8  
  
  
  
   9  
  
  
  
   10  
  
  
  
   11  
  
  
  
  
  12  
  
  
  
   13  
  
  
  
   14  
  
  
  
   15  
  
  
  
   16  
  
  
  
   17  
  
  
  
   18  
  
  
  
  
  19  
  
  
  
   20  
  
  
  
   21  
  
  
  
   22  
  
  
  
   23  
  
  
  
   24  
  
  
  
   25  
  
  
  
  
  26  
  
  
  
   27  
  
  
  
   28  
  
  
  
   29  
  
  
  
   30  
  
  
  
    
 Date Details No additional details Date of next INR:  11/2/2017 How to take your warfarin dose To take:  8 mg Take two of the 4 mg tablets. We Performed the Following AMB POC PT/INR [53364 CPT(R)] Follow-up Instructions Return in about 4 weeks (around 11/2/2017), or if symptoms worsen or fail to improve. Introducing [facility]! Swapnil Wheeler introduces [employer] patient portal. Now you can access parts of your medical record, email your doctor's office, and request medication refills online. 1. In your internet browser, go to [email or web address] 2. Click on the First Time User? Click Here link in the Sign In box. You will see the New Member Sign Up page. 3. Enter your [employer] Access Code exactly as it appears below. You will not need to use this code after youve completed the sign-up process. If you do not sign up before the expiration date, you must request a new code. · [employer] Access Code: [email or web address] Expires: 1/3/2018 11:43 AM 
 
4. Enter the last four digits of your Social Security Number (xxxx) and Date of Birth (mm/dd/yyyy) as indicated and click Submit. You will be taken to the next sign-up page. 5. Create a [employer] ID. This will be your [employer] login ID and cannot be changed, so think of one that is secure and easy to remember. 6. Create a [employer] password. You can change your password at any time. 7. Enter your Password Reset Question and Answer. This can be used at a later time if you forget your password. 8. Enter your e-mail address. You will receive e-mail notification when new information is available in [address] 9. Click Sign Up. You can now view and download portions of your medical record. 10. Click the Download Summary menu link to download a portable copy of your medical information. If you have questions, please visit the Frequently Asked Questions section of the [employer] website. Remember, [employer] is NOT to be used for urgent needs. For medical emergencies, dial 911. Now available from your iPhone and Android! Please provide this summary of care documentation to your next provider. Your primary care clinician is listed as Choa Orosco. If you have any questions after today's visit, please call [phone].

## 2017-10-05 NOTE — PROGRESS NOTES
Chief Complaint   Patient presents with    Follow-up     PT/INR   Discuss Flu vaccine  Continues to have cough  Room 4  Flu vaccine given  Nabil Parents is a 80 y.o. male  He denies any symptoms , reactions or allergies that would exclude them from being immunized today. Risks and adverse reactions were discussed and the VIS was given to them. All questions were addressed. He was observed for 15 min post injection. There were no reactions observed.     Cody Lara LPN

## 2017-10-12 ENCOUNTER — TELEPHONE (OUTPATIENT)
Dept: ENDOCRINOLOGY | Age: 82
End: 2017-10-12

## 2017-10-12 ENCOUNTER — TELEPHONE (OUTPATIENT)
Dept: FAMILY MEDICINE CLINIC | Age: 82
End: 2017-10-12

## 2017-10-12 NOTE — TELEPHONE ENCOUNTER
Pls call patient in ref to:  10/31/17 botox for Bladder     Best number to reach him is [phone] or [phone]

## 2017-10-12 NOTE — TELEPHONE ENCOUNTER
Received fax from [facility] requesting clearance related to coumadin therapy for upcoming cystoscopy. Per Dr. Rosa Lopez, Dr. Sol Carmona is managing and prescribing coumadin. Contacted Fernanda Haddad with [facility] and made her aware of this (contact # [phone]).

## 2017-10-13 NOTE — TELEPHONE ENCOUNTER
Patient advised received paperwork from Urologist. Dr Kaylah Staley will review and notify their office.

## 2017-10-19 ENCOUNTER — TELEPHONE (OUTPATIENT)
Dept: FAMILY MEDICINE CLINIC | Age: 82
End: 2017-10-19

## 2017-10-19 NOTE — TELEPHONE ENCOUNTER
[facility] calling for surgery clearance for patient     Pls call Cortney Buenrostro @ [phone]  Fax [phone]

## 2017-10-29 DIAGNOSIS — Z86.718 HISTORY OF RECURRENT DEEP VEIN THROMBOSIS (DVT): ICD-10-CM

## 2017-10-30 RX ORDER — WARFARIN SODIUM 4 MG/1
TABLET ORAL
Qty: 180 TAB | Refills: 0 | Status: SHIPPED | OUTPATIENT
Start: 2017-10-30 | End: 2017-11-13 | Stop reason: SDUPTHER

## 2017-11-13 ENCOUNTER — OFFICE VISIT (OUTPATIENT)
Dept: FAMILY MEDICINE CLINIC | Age: 82
End: 2017-11-13

## 2017-11-13 VITALS
TEMPERATURE: 97.7 F | RESPIRATION RATE: 16 BRPM | HEIGHT: 69 IN | SYSTOLIC BLOOD PRESSURE: 123 MMHG | WEIGHT: 276.6 LBS | HEART RATE: 85 BPM | BODY MASS INDEX: 40.97 KG/M2 | DIASTOLIC BLOOD PRESSURE: 63 MMHG | OXYGEN SATURATION: 96 %

## 2017-11-13 DIAGNOSIS — Z79.01 ENCOUNTER FOR MONITORING COUMADIN THERAPY: ICD-10-CM

## 2017-11-13 DIAGNOSIS — D68.59 HYPERCOAGULABLE STATE (HCC): Primary | ICD-10-CM

## 2017-11-13 DIAGNOSIS — R35.1 BENIGN PROSTATIC HYPERPLASIA WITH NOCTURIA: ICD-10-CM

## 2017-11-13 DIAGNOSIS — Z79.4 TYPE 2 DIABETES MELLITUS WITHOUT COMPLICATION, WITH LONG-TERM CURRENT USE OF INSULIN (HCC): ICD-10-CM

## 2017-11-13 DIAGNOSIS — M77.8 LEFT WRIST TENDONITIS: ICD-10-CM

## 2017-11-13 DIAGNOSIS — E11.9 TYPE 2 DIABETES MELLITUS WITHOUT COMPLICATION, WITH LONG-TERM CURRENT USE OF INSULIN (HCC): ICD-10-CM

## 2017-11-13 DIAGNOSIS — I10 ESSENTIAL HYPERTENSION: ICD-10-CM

## 2017-11-13 DIAGNOSIS — Z86.718 HISTORY OF RECURRENT DEEP VEIN THROMBOSIS (DVT): ICD-10-CM

## 2017-11-13 DIAGNOSIS — N40.1 BENIGN PROSTATIC HYPERPLASIA WITH NOCTURIA: ICD-10-CM

## 2017-11-13 DIAGNOSIS — Z51.81 ENCOUNTER FOR MONITORING COUMADIN THERAPY: ICD-10-CM

## 2017-11-13 LAB
INR BLD: 4.3
PT POC: NORMAL SECONDS
VALID INTERNAL CONTROL?: YES

## 2017-11-13 RX ORDER — WARFARIN SODIUM 4 MG/1
TABLET ORAL
Qty: 180 TAB | Refills: 2 | Status: SHIPPED | OUTPATIENT
Start: 2017-11-13 | End: 2018-08-16 | Stop reason: DRUGHIGH

## 2017-11-13 RX ORDER — DICLOFENAC SODIUM 10 MG/G
GEL TOPICAL 4 TIMES DAILY
Qty: 100 G | Refills: 0 | Status: ON HOLD | OUTPATIENT
Start: 2017-11-13 | End: 2018-06-15 | Stop reason: CLARIF

## 2017-11-13 RX ORDER — TELMISARTAN AND AMLODIPINE 5; 80 MG/1; MG/1
TABLET ORAL
Qty: 90 TAB | Refills: 1 | Status: SHIPPED | OUTPATIENT
Start: 2017-11-13 | End: 2018-05-27 | Stop reason: SDUPTHER

## 2017-11-13 RX ORDER — METFORMIN HYDROCHLORIDE 1000 MG/1
1000 TABLET ORAL 2 TIMES DAILY
Qty: 180 TAB | Refills: 1 | Status: ON HOLD | OUTPATIENT
Start: 2017-11-13 | End: 2018-06-14 | Stop reason: SDUPTHER

## 2017-11-13 RX ORDER — FAMOTIDINE 20 MG/1
20 TABLET, FILM COATED ORAL 2 TIMES DAILY
Qty: 180 TAB | Refills: 3 | Status: SHIPPED | OUTPATIENT
Start: 2017-11-13 | End: 2018-04-18

## 2017-11-13 RX ORDER — INSULIN GLARGINE 100 [IU]/ML
55 INJECTION, SOLUTION SUBCUTANEOUS DAILY
Qty: 15 PEN | Refills: 3 | Status: SHIPPED | OUTPATIENT
Start: 2017-11-13 | End: 2019-02-11 | Stop reason: SDUPTHER

## 2017-11-13 NOTE — PROGRESS NOTES
Chief Complaint   Patient presents with    Follow-up     PT/INR   1. Have you been to the ER, urgent care clinic since your last visit? Hospitalized since your last visit? No    2. Have you seen or consulted any other health care providers outside of the [address] [place] [address] since your last visit? Include any pap smears or colon screening.  Yes Where: Urologist   Continues to have problems with burping and gas  Left arm pain fell in bathtub 2-3 weeks ago  Continues to have cough and drainage  Thinks he took double dose a few days ago  Room 9

## 2017-11-13 NOTE — PROGRESS NOTES
Subjective:     Chief Complaint   Patient presents with    Follow-up     PT/INR      He  is a 80 y.o. male who presents for evaluation of:  Here for INR check. 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 See anti-coag tab for history and dosing with plan. Fell 2 weeks ago and landed on bilat arms. Had sig bruising and pain on left arm and slowly getting better. Pain is from elbow down to wrist.  Declines getting x-rays. Pmhx sig for DM2 (seeing Dr. 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 Diabetes has been well controlled with Lantus, Metformin, and Bydureon. ROS per HPI    Past Medical History:   Diagnosis Date    Back injury 1994    Diabetes ([address]     Muscle cramps     Reflux     Thromboembolus ([address]      Past Surgical History:   Procedure Laterality Date    HX ORTHOPAEDIC Left     Crushed/left index finger amputee    HX OTHER SURGICAL      hand surgery, heal spur     Current Outpatient Prescriptions on File Prior to Visit   Medication Sig Dispense Refill    finasteride (PROSCAR) 5 mg tablet TAKE 1 TABLET DAILY FOR SYMPTOMATIC BENIGN PROSTATIC HYPERPLASIA 30 Tab 4    ALTAGRACIA PEN NEEDLE 32 gauge x 5/32\" ndle       cholecalciferol, VITAMIN D3, (VITAMIN D3) 5,000 unit tab tablet Take  by mouth daily. Nature Made      cyanocobalamin (VITAMIN B-12) 1,000 mcg tablet Take 1,000 mcg by mouth daily. Nature Made      magnesium oxide (MAG-OX) 400 mg tablet Take 1 Tab by mouth four (4) times daily. For low magnesium (Patient taking differently: Take 1,600 mg by mouth daily. For low magnesium) 360 Tab 3    Insulin Needles, Disposable, (BD INSULIN PEN NEEDLE UF MINI) 31 x 3/16 \" ndle 1 each by SubCUTAneous route daily. 100 each 3    cyclobenzaprine (FLEXERIL) 10 mg tablet Take  by mouth three (3) times daily as needed for Muscle Spasm(s). No current facility-administered medications on file prior to visit.          Objective: Vitals:    11/13/17 1042   BP: 123/63   Pulse: 85   Resp: 16   Temp: 97.7 °F (36.5 °C)   TempSrc: Oral   SpO2: 96%   Weight: 276 lb 9.6 oz (125.5 kg)   Height: 5' 8.5\" (1.74 m)     Physical Examination:  General appearance - alert, well appearing, and in no distress  Eyes -sclera anicteric  Neck - supple, no significant adenopathy, no thyromegaly  Chest - clear to auscultation, no wheezes, rales or rhonchi, symmetric air entry  Heart - normal rate, regular rhythm, normal S1, S2, no murmurs, rubs, clicks or gallops  Neurological - alert, oriented, no focal findings or movement disorder noted  Extr - trace edema, LLE with poorly healing 1 cm lesion, tender to palpation over left wrist and elbow with small hematoma noted just proximal to elbow    Assessment/ Plan:   Diagnoses and all orders for this visit:    1. Hypercoagulable state ([address]  2. History of recurrent deep vein thrombosis (DVT)  3. Encounter for monitoring coumadin therapy  INR supratherapeutic today, will hold Coumadin ×2 days and follow-up in 1 week. Patient is interested in having left lower extremity lesion biopsied so may hold Coumadin for another 2 days prior to biopsy. -     AMB POC PT/INR  -     COUMADIN 4 mg tablet; TAKE 2 TABLETS DAILY    4. Essential hypertensioncontrolled  -     Telmisartan-amLODIPine 80-5 mg tab; TAKE 1 TABLET NIGHTLY FOR BLOOD PRESSURE    5. Type 2 diabetes mellitus without complication, with long-term current use of insulin (HCC)controlled  -     metFORMIN (GLUCOPHAGE) 1,000 mg tablet; Take 1 Tab by mouth two (2) times a day. -     exenatide microspheres (BYDUREON) 2 mg/0.65 mL pnij; 2 mg by SubCUTAneous route every seven (7) days. For diabetes  -     insulin glargine (LANTUS SOLOSTAR) 100 unit/mL (3 mL) inpn; 55 Units by SubCUTAneous route daily. 6. Left wrist tendonitis we will plan to use Voltaren gel acutely and have patient rest and ice this area  -     diclofenac (VOLTAREN) 1 % gel;  Apply  to affected area four (4) times daily. 7. Benign prostatic hyperplasia with nocturia    Other orders  -     famotidine (PEPCID) 20 mg tablet; Take 1 Tab by mouth two (2) times a day. For stomach acid. I have discussed the diagnosis with the patient and the intended plan as seen in the above orders. The patient has received an after-visit summary and questions were answered concerning future plans. I have discussed medication side effects and warnings with the patient as well. The patient verbalizes understanding and agreement with the plan. Follow-up Disposition:  Return in about 1 week (around 11/20/2017), or if symptoms worsen or fail to improve.

## 2017-11-13 NOTE — MR AVS SNAPSHOT
Visit Information Date & Time Provider Department Dept. Phone Encounter #  
 11/13/2017 10:10 AM Ray Morales MD [facility] at [address] [id number] Follow-up Instructions Return in about 1 week (around 11/20/2017), or if symptoms worsen or fail to improve. Your Appointments 11/15/2017 11:30 AM  
Follow Up with Edie Lucio MD  
[place] [facility] Appt Note: 4 month f/u   Diabetes [address] [id number] [address] [facility] Upcoming Health Maintenance Date Due MICROALBUMIN Q1 11/16/2017 LIPID PANEL Q1 11/16/2017 HEMOGLOBIN A1C Q6M 1/12/2018 FOOT EXAM Q1 3/15/2018 MEDICARE YEARLY EXAM 3/21/2018 Pneumococcal 65+ Low/Medium Risk (2 of 2 - PPSV23) 5/18/2018 EYE EXAM RETINAL OR DILATED Q1 8/3/2018 GLAUCOMA SCREENING Q2Y 8/3/2019 DTaP/Tdap/Td series (2 - Td) 5/18/2027 Allergies as of 11/13/2017  Review Complete On: 11/13/2017 By: Ray Morales MD  
  
 Severity Noted Reaction Type Reactions Statins-hmg-coa Reductase Inhibitors  03/23/2016   Side Effect Myalgia Tried several statins. Current Immunizations  Never Reviewed Name Date Influenza High Dose Vaccine PF 10/5/2017 Not reviewed this visit You Were Diagnosed With   
  
 Codes Comments Hypercoagulable state ([facility]    -  Primary ICD-10-CM: S18.41 
ICD-9-CM: 289.81 History of recurrent deep vein thrombosis (DVT)     ICD-10-CM: O91.070 ICD-9-CM: V12.51 Encounter for monitoring coumadin therapy     ICD-10-CM: Z51.81, Z79.01 
ICD-9-CM: V58.83, V58.61 Essential hypertension     ICD-10-CM: I10 
ICD-9-CM: 401.9 Type 2 diabetes mellitus without complication, with long-term current use of insulin (HCC)     ICD-10-CM: E11.9, Z79.4 ICD-9-CM: 250.00, V58.67   
 Left wrist tendonitis     ICD-10-CM: M77.8 ICD-9-CM: 727.05 Benign prostatic hyperplasia with nocturia     ICD-10-CM: N40.1, R35.1 ICD-9-CM: 600.01, 788.43 Vitals BP Pulse Temp Resp Height(growth percentile) Weight(growth percentile) 123/63 (BP 1 Location: Left arm, BP Patient Position: Sitting) 85 97.7 °F (36.5 °C) (Oral) 16 5' 8.5\" (1.74 m) 276 lb 9.6 oz (125.5 kg) SpO2 BMI Smoking Status 96% 41.45 kg/m2 Former Smoker Vitals History BMI and BSA Data Body Mass Index Body Surface Area  
 41.45 kg/m 2 2.46 m 2 Preferred Pharmacy Pharmacy Name Phone [facility]is[address] [address] [place] [address] [phone] Your Updated Medication List  
  
   
This list is accurate as of: 11/13/17 11:29 AM.  Always use your most recent med list.  
  
  
  
  
 COUMADIN 4 mg tablet Generic drug:  warfarin TAKE 2 TABLETS DAILY  
  
 diclofenac 1 % Gel Commonly known as:  VOLTAREN Apply  to affected area four (4) times daily. exenatide microspheres 2 mg/0.65 mL Pnij Commonly known as:  BYDUREON  
2 mg by SubCUTAneous route every seven (7) days. For diabetes  
  
 famotidine 20 mg tablet Commonly known as:  PEPCID Take 1 Tab by mouth two (2) times a day. For stomach acid. finasteride 5 mg tablet Commonly known as:  PROSCAR  
TAKE 1 TABLET DAILY FOR SYMPTOMATIC BENIGN PROSTATIC HYPERPLASIA FLEXERIL 10 mg tablet Generic drug:  cyclobenzaprine Take  by mouth three (3) times daily as needed for Muscle Spasm(s). insulin glargine 100 unit/mL (3 mL) Inpn Commonly known as:  LANTUS SOLOSTAR  
55 Units by SubCUTAneous route daily. Insulin Needles (Disposable) 31 gauge x 3/16\" Ndle Commonly known as:  BD INSULIN PEN NEEDLE UF MINI  
1 each by SubCUTAneous route daily. Linda Pen Needle 32 gauge x 5/32\" Ndle Generic drug:  Insulin Needles (Disposable)  
  
 magnesium oxide 400 mg tablet Commonly known as:  MAG-OX Take 1 Tab by mouth four (4) times daily. For low magnesium  
  
 metFORMIN 1,000 mg tablet Commonly known as:  GLUCOPHAGE Take 1 Tab by mouth two (2) times a day. Telmisartan-amLODIPine 80-5 mg Tab TAKE 1 TABLET NIGHTLY FOR BLOOD PRESSURE  
  
 VITAMIN B-12 1,000 mcg tablet Generic drug:  cyanocobalamin Take 1,000 mcg by mouth daily. Nature Made VITAMIN D3 5,000 unit Tab tablet Generic drug:  cholecalciferol (VITAMIN D3) Take  by mouth daily. [facility] Made Prescriptions Sent to Pharmacy Refills  
 exenatide microspheres (BYDUREON) 2 mg/0.65 mL pnij 3 Si mg by SubCUTAneous route every seven (7) days. For diabetes Class: Normal  
 Pharmacy: [address], [address] Ph #: [phone] Route: SubCUTAneous  
 famotidine (PEPCID) 20 mg tablet 3 Sig: Take 1 Tab by mouth two (2) times a day. For stomach acid. Class: Normal  
 Pharmacy: [address], [address] Ph #: [phone] Route: Oral  
 Telmisartan-amLODIPine 80-5 mg tab 1 Sig: TAKE 1 TABLET NIGHTLY FOR BLOOD PRESSURE Class: Normal  
 Pharmacy: [address], [address] Ph #: [phone]  
 metFORMIN (GLUCOPHAGE) 1,000 mg tablet 1 Sig: Take 1 Tab by mouth two (2) times a day. Class: Normal  
 Pharmacy: [address], [address] Ph #: [phone] Route: Oral  
 insulin glargine (LANTUS SOLOSTAR) 100 unit/mL (3 mL) inpn 3 Si Units by SubCUTAneous route daily. Class: Normal  
 Pharmacy: [address], [address] Ph #: [phone] Route: SubCUTAneous COUMADIN 4 mg tablet 2 Sig: TAKE 2 TABLETS DAILY Class: Normal  
 Pharmacy: [address], [address] Ph #: [phone] diclofenac (VOLTAREN) 1 % gel 0 Sig: Apply  to affected area four (4) times daily. Class: Normal  
 Pharmacy: [id number] [address], [address]  #: [phone] Route: Topical  
  
November 2017 Details Sun Mon Tue Wed Thu Fri Sat  
     1  
  
  
  
   2  
  
  
  
   3  
  
  
  
   4  
  
  
  
  
  5  
  
  
  
   6  
  
  
  
   7  
  
  
  
   8  
  
  
  
   9  
  
  
  
   10  
  
  
  
   11  
  
  
  
  
  12  
  
  
  
   13  
  
Hold See details 15 Hold 15  
  
8 mg  
  
   16  
  
8 mg  
  
   17  
  
8 mg  
  
   18  
  
8 mg  
  
  
  19  
  
8 mg  
  
   20  
  
8 mg  
  
   21  
  
  
  
   22  
  
  
  
   23  
  
  
  
   24  
  
  
  
   25  
  
  
  
  
  26  
  
  
  
   27  
  
  
  
   28  
  
  
  
   29  
  
  
  
   30  
  
  
  
    
 Date Details 11/13 This INR check INR: 4.3 Date of next INR:  11/20/2017 How to take your warfarin dose To take:  8 mg Take two of the 4 mg tablets. Hold Do not take your warfarin dose. See the Details table to the right for additional instructions. We Performed the Following AMB POC PT/INR [14742 CPT(R)] Follow-up Instructions Return in about 1 week (around 11/20/2017), or if symptoms worsen or fail to improve. Introducing [facility]! Elizabeth Riley introduces [employer] patient portal. Now you can access parts of your medical record, email your doctor's office, and request medication refills online. 1. In your internet browser, go to [email or web address] 2. Click on the First Time User? Click Here link in the Sign In box. You will see the New Member Sign Up page. 3. Enter your V[employer] Access Code exactly as it appears below. You will not need to use this code after youve completed the sign-up process. If you do not sign up before the expiration date, you must request a new code. · D2S Access Code: [email or web address] Expires: 1/3/2018 10:43 AM 
 
4. Enter the last four digits of your Social Security Number (xxxx) and Date of Birth (mm/dd/yyyy) as indicated and click Submit. You will be taken to the next sign-up page. 5. Create a D2S ID. This will be your D2S login ID and cannot be changed, so think of one that is secure and easy to remember. 6. Create a D2S password. You can change your password at any time. 7. Enter your Password Reset Question and Answer. This can be used at a later time if you forget your password. 8. Enter your e-mail address. You will receive e-mail notification when new information is available in [address] 9. Click Sign Up. You can now view and download portions of your medical record. 10. Click the Download Summary menu link to download a portable copy of your medical information. If you have questions, please visit the Frequently Asked Questions section of the D2S website. Remember, D2S is NOT to be used for urgent needs. For medical emergencies, dial 911. Now available from your iPhone and Android! Please provide this summary of care documentation to your next provider. Your primary care clinician is listed as Nehal Montana. If you have any questions after today's visit, please call [phone].

## 2017-11-15 ENCOUNTER — OFFICE VISIT (OUTPATIENT)
Dept: ENDOCRINOLOGY | Age: 82
End: 2017-11-15

## 2017-11-15 VITALS
HEIGHT: 69 IN | BODY MASS INDEX: 40.88 KG/M2 | WEIGHT: 276 LBS | SYSTOLIC BLOOD PRESSURE: 138 MMHG | HEART RATE: 83 BPM | DIASTOLIC BLOOD PRESSURE: 61 MMHG

## 2017-11-15 DIAGNOSIS — R80.9 TYPE 2 DIABETES MELLITUS WITH MICROALBUMINURIA, WITH LONG-TERM CURRENT USE OF INSULIN (HCC): Primary | ICD-10-CM

## 2017-11-15 DIAGNOSIS — Z79.4 TYPE 2 DIABETES MELLITUS WITH MICROALBUMINURIA, WITH LONG-TERM CURRENT USE OF INSULIN (HCC): Primary | ICD-10-CM

## 2017-11-15 DIAGNOSIS — R79.0 LOW MAGNESIUM LEVEL: ICD-10-CM

## 2017-11-15 DIAGNOSIS — E78.5 DYSLIPIDEMIA: ICD-10-CM

## 2017-11-15 DIAGNOSIS — I10 ESSENTIAL HYPERTENSION: ICD-10-CM

## 2017-11-15 DIAGNOSIS — E11.29 TYPE 2 DIABETES MELLITUS WITH MICROALBUMINURIA, WITH LONG-TERM CURRENT USE OF INSULIN (HCC): Primary | ICD-10-CM

## 2017-11-15 DIAGNOSIS — R80.9 MICROALBUMINURIA: ICD-10-CM

## 2017-11-15 NOTE — PATIENT INSTRUCTIONS
Diabetes type II. Continue metformin   Continue Bydureon 2 mg weekly  Continue Latnus 55 units     Continue dietary efforts. Blood pressure:  Continue amlodipine + telmisartan - > take at bedtime    Low magnesium:  Continue taking 1600 mg per day. See if you can take 400 mg breakfast, lunch, supper and bedtime OR  Continue 800 mg twice daily.

## 2017-11-15 NOTE — MR AVS SNAPSHOT
Visit Information Date & Time Provider Department Dept. Phone Encounter #  
 11/15/2017 11:30 AM Ofelia Wagner, [address] [place] [address] Diabetes and Endocrinology [phone] [id number] Follow-up Instructions Return in about 6 months (around 5/15/2018). Your Appointments 11/21/2017 11:30 AM  
ROUTINE CARE with Chrissy Hunter MD  
[facility]) Appt Note: Biopsy /Follow-up PT/INR/Patient told 11:10 am  
 [address] [facility] [address] [id number]  
[facility] Denice  
  
    
 1/3/2018 11:50 AM  
ROUTINE CARE with Chrissy Hunter MD  
[facility]) Appt Note: routine f/u No PB 11/13/17 ge COPAY 10 Pay at time of visit [address]  
[phone] Upcoming Health Maintenance Date Due MICROALBUMIN Q1 11/16/2017 LIPID PANEL Q1 11/16/2017 HEMOGLOBIN A1C Q6M 1/12/2018 FOOT EXAM Q1 3/15/2018 MEDICARE YEARLY EXAM 3/21/2018 Pneumococcal 65+ Low/Medium Risk (2 of 2 - PPSV23) 5/18/2018 EYE EXAM RETINAL OR DILATED Q1 8/3/2018 GLAUCOMA SCREENING Q2Y 8/3/2019 DTaP/Tdap/Td series (2 - Td) 5/18/2027 Allergies as of 11/15/2017  Review Complete On: 11/15/2017 By: Ofelia Wagner MD  
  
 Severity Noted Reaction Type Reactions Statins-hmg-coa Reductase Inhibitors  03/23/2016   Side Effect Myalgia Tried several statins. Current Immunizations  Never Reviewed Name Date Influenza High Dose Vaccine PF 10/5/2017 Not reviewed this visit You Were Diagnosed With   
  
 Codes Comments Type 2 diabetes mellitus with microalbuminuria, with long-term current use of insulin (HCC)    -  Primary ICD-10-CM: E11.29, R80.9, Z79.4 ICD-9-CM: 250.40, 791.0, V58.67 Essential hypertension     ICD-10-CM: I10 
ICD-9-CM: 401.9 Dyslipidemia     ICD-10-CM: E78.5 ICD-9-CM: 272.4 Low magnesium level     ICD-10-CM: R79.0 ICD-9-CM: 790.6 BMI 40.0-44.9, adult [facility])     ICD-10-CM: Z68.41 
ICD-9-CM: V85.41 Microalbuminuria     ICD-10-CM: R80.9 ICD-9-CM: 791.0 Vitals BP Pulse Height(growth percentile) Weight(growth percentile) BMI Smoking Status 138/61 83 5' 8.5\" (1.74 m) 276 lb (125.2 kg) 41.36 kg/m2 Former Smoker Vitals History BMI and BSA Data Body Mass Index Body Surface Area  
 41.36 kg/m 2 2.46 m 2 Preferred Pharmacy Pharmacy Name Phone [facility] [address] [phone] Your Updated Medication List  
  
   
This list is accurate as of: 11/15/17 12:16 PM.  Always use your most recent med list.  
  
  
  
  
 COUMADIN 4 mg tablet Generic drug:  warfarin TAKE 2 TABLETS DAILY  
  
 diclofenac 1 % Gel Commonly known as:  VOLTAREN Apply  to affected area four (4) times daily. exenatide microspheres 2 mg/0.65 mL Pnij Commonly known as:  BYDUREON  
2 mg by SubCUTAneous route every seven (7) days. For diabetes  
  
 famotidine 20 mg tablet Commonly known as:  PEPCID Take 1 Tab by mouth two (2) times a day. For stomach acid. finasteride 5 mg tablet Commonly known as:  PROSCAR  
TAKE 1 TABLET DAILY FOR SYMPTOMATIC BENIGN PROSTATIC HYPERPLASIA FLEXERIL 10 mg tablet Generic drug:  cyclobenzaprine Take  by mouth three (3) times daily as needed for Muscle Spasm(s). insulin glargine 100 unit/mL (3 mL) Inpn Commonly known as:  LANTUS SOLOSTAR  
55 Units by SubCUTAneous route daily. Insulin Needles (Disposable) 31 gauge x 3/16\" Ndle Commonly known as:  BD INSULIN PEN NEEDLE UF MINI  
1 each by SubCUTAneous route daily. Linda Pen Needle 32 gauge x 5/32\" Ndle Generic drug:  Insulin Needles (Disposable)  
  
 magnesium oxide 400 mg tablet Commonly known as:  MAG-OX Take 1 Tab by mouth four (4) times daily. For low magnesium  
  
 metFORMIN 1,000 mg tablet Commonly known as:  GLUCOPHAGE Take 1 Tab by mouth two (2) times a day. Telmisartan-amLODIPine 80-5 mg Tab TAKE 1 TABLET NIGHTLY FOR BLOOD PRESSURE  
  
 VITAMIN B-12 1,000 mcg tablet Generic drug:  cyanocobalamin Take 1,000 mcg by mouth daily. Nature Made VITAMIN D3 5,000 unit Tab tablet Generic drug:  cholecalciferol (VITAMIN D3) Take  by mouth daily. Nature Made We Performed the Following HEMOGLOBIN A1C WITH EAG [40538 CPT(R)] LIPID PANEL [93565 CPT(R)] METABOLIC PANEL, COMPREHENSIVE [65296 CPT(R)] MICROALBUMIN, UR, RAND W/ MICROALBUMIN/CREA RATIO [id number] CPT(R)] ID COLLECTION VENOUS BLOOD,VENIPUNCTURE [id number] CPT(R)] ID HANDLG&/OR CONVEY OF SPEC FOR TR OFFICE TO LAB [15015 CPT(R)] Follow-up Instructions Return in about 6 months (around 5/15/2018). Patient Instructions Diabetes type II. Continue metformin Continue Bydureon 2 mg weekly Continue Latnus 55 units Continue dietary efforts. Blood pressure: 
Continue amlodipine + telmisartan - > take at bedtime Low magnesium: 
Continue taking 1600 mg per day. See if you can take 400 mg breakfast, lunch, supper and bedtime OR  Continue 800 mg twice daily. Introducing [facility]! Deb Bar introduces [employer] patient portal. Now you can access parts of your medical record, email your doctor's office, and request medication refills online. 1. In your internet browser, go to [email or web address] 2. Click on the First Time User? Click Here link in the Sign In box. You will see the New Member Sign Up page. 3. Enter your [employer] Access Code exactly as it appears below. You will not need to use this code after youve completed the sign-up process. If you do not sign up before the expiration date, you must request a new code. · 123people Access Code: [email or web address] Expires: 1/3/2018 10:43 AM 
 
4. Enter the last four digits of your Social Security Number (xxxx) and Date of Birth (mm/dd/yyyy) as indicated and click Submit. You will be taken to the next sign-up page. 5. Create a 123people ID. This will be your 123people login ID and cannot be changed, so think of one that is secure and easy to remember. 6. Create a 123people password. You can change your password at any time. 7. Enter your Password Reset Question and Answer. This can be used at a later time if you forget your password. 8. Enter your e-mail address. You will receive e-mail notification when new information is available in [address] 9. Click Sign Up. You can now view and download portions of your medical record. 10. Click the Download Summary menu link to download a portable copy of your medical information. If you have questions, please visit the Frequently Asked Questions section of the 123people website. Remember, 123people is NOT to be used for urgent needs. For medical emergencies, dial 911. Now available from your iPhone and Android! Please provide this summary of care documentation to your next provider. Your primary care clinician is listed as Estefany Aceves. If you have any questions after today's visit, please call [phone].

## 2017-11-15 NOTE — PROGRESS NOTES
History of Present Illness: Melania Brown is a 80 y.o. male presents for follow-up of diabetes. He has had diabetes for 20 + years. Diabetes related complications:  Neuropathy: + mild sx of neuropathy. No other known complications. A1c 5.5 in summer 2018    Current diabetes regimen:  Lantus 55 units   Metformin 1 g BID  Bydureon 2 mg weekly - added 11/2014. Glucoses:   Meter reviewed: Most are fasting. Values are 110-150 fasting. None < 100. NO lows on meter and he denies sx of hypoglycemia      Weight:  stable. Maintained wt loss from several years ago    Lipids - tried several statins, had very bothersome muscle cramps. Unwilling to try again    HTN : amlodipine/telmisartan. Blood pressure well controlled  Microalbuminuria improving    Hypomagnesemia - 800 mg twice daily. Magnesium has remained low. Reflux - did not tolerate tapering off Prevacid. Nocturia remains a problem - following with urology. Did not want to do Botox injections. Stopped drinking earlier. Getting up about 4 times. Past Medical History:   Diagnosis Date    Back injury 1994    Diabetes ([address]     Muscle cramps     Reflux     Thromboembolus (HCC)      Current Outpatient Prescriptions   Medication Sig    exenatide microspheres (BYDUREON) 2 mg/0.65 mL pnij 2 mg by SubCUTAneous route every seven (7) days. For diabetes    famotidine (PEPCID) 20 mg tablet Take 1 Tab by mouth two (2) times a day. For stomach acid.  Telmisartan-amLODIPine 80-5 mg tab TAKE 1 TABLET NIGHTLY FOR BLOOD PRESSURE    metFORMIN (GLUCOPHAGE) 1,000 mg tablet Take 1 Tab by mouth two (2) times a day.  insulin glargine (LANTUS SOLOSTAR) 100 unit/mL (3 mL) inpn 55 Units by SubCUTAneous route daily.  COUMADIN 4 mg tablet TAKE 2 TABLETS DAILY    diclofenac (VOLTAREN) 1 % gel Apply  to affected area four (4) times daily.     finasteride (PROSCAR) 5 mg tablet TAKE 1 TABLET DAILY FOR SYMPTOMATIC BENIGN PROSTATIC HYPERPLASIA    ALTAGRACIA PEN NEEDLE 32 gauge x 5/32\" ndle     cholecalciferol, VITAMIN D3, (VITAMIN D3) 5,000 unit tab tablet Take  by mouth daily. Nature Made    cyanocobalamin (VITAMIN B-12) 1,000 mcg tablet Take 1,000 mcg by mouth daily. Nature Made    magnesium oxide (MAG-OX) 400 mg tablet Take 1 Tab by mouth four (4) times daily. For low magnesium (Patient taking differently: Take 1,600 mg by mouth daily. For low magnesium)    Insulin Needles, Disposable, (BD INSULIN PEN NEEDLE UF MINI) 31 x 3/16 \" ndle 1 each by SubCUTAneous route daily.  cyclobenzaprine (FLEXERIL) 10 mg tablet Take  by mouth three (3) times daily as needed for Muscle Spasm(s). No current facility-administered medications for this visit. Allergies   Allergen Reactions    Statins-Hmg-Coa Reductase Inhibitors Myalgia     Tried several statins.         Review of Systems:  - Eyes: no blurry vision or double vision  - Cardiovascular: no chest pain  - Respiratory: no shortness of breath  - Musculoskeletal: no myalgias  - Neurological: no numbness/tingling in extremities    Physical Examination:  Visit Vitals    /61    Pulse 83    Ht 5' 8.5\" (1.74 m)    Wt 276 lb (125.2 kg)    BMI 41.36 kg/m2   -   - General: pleasant, no distress, normal gait   HEENT: hearing intact, EOMI, clear sclera without icterus  - Cardiovascular: regular, normal rate   - Respiratory: normal effort  - Integumentary: no edema  - Psychiatric: normal mood and affect    Data Reviewed:   Component      Latest Ref Rng & Units 7/12/2017 7/12/2017 7/12/2017 3/15/2017          12:50 PM 12:50 PM 12:50 PM  2:04 PM   Glucose      65 - 99 mg/dL  84     BUN      8 - 27 mg/dL  15     Creatinine      0.76 - 1.27 mg/dL  0.95     GFR est non-AA      >59 mL/min/1.73  74     GFR est AA      >59 mL/min/1.73  86     BUN/Creatinine ratio      10 - 24  16     Sodium      134 - 144 mmol/L  136     Potassium      3.5 - 5.2 mmol/L  4.8     Chloride      96 - 106 mmol/L  101     CO2      18 - 29 mmol/L  22 Calcium      8.6 - 10.2 mg/dL  9.2     Cholesterol, total      100 - 199 mg/dL       Triglyceride      0 - 149 mg/dL       HDL Cholesterol      >39 mg/dL       VLDL, calculated      5 - 40 mg/dL       LDL, calculated      0 - 99 mg/dL       Creatinine, urine      Not Estab. mg/dL       Microalbumin, urine      Not Estab. ug/mL       Microalbumin/Creat. Ratio      0.0 - 30.0 mg/g creat       Hemoglobin A1c, (calculated)      4.8 - 5.6 %   5.9 (H)    Estimated average glucose      mg/dL   123    Magnesium      1.6 - 2.3 mg/dL 1.5 (L)   1.2 (L)     Component      Latest Ref Rng & Units 3/15/2017 11/16/2016 11/16/2016           2:04 PM  2:12 PM  2:12 PM   Glucose      65 - 99 mg/dL      BUN      8 - 27 mg/dL      Creatinine      0.76 - 1.27 mg/dL      GFR est non-AA      >59 mL/min/1.73      GFR est AA      >59 mL/min/1.73      BUN/Creatinine ratio      10 - 24      Sodium      134 - 144 mmol/L      Potassium      3.5 - 5.2 mmol/L      Chloride      96 - 106 mmol/L      CO2      18 - 29 mmol/L      Calcium      8.6 - 10.2 mg/dL      Cholesterol, total      100 - 199 mg/dL   198   Triglyceride      0 - 149 mg/dL   152 (H)   HDL Cholesterol      >39 mg/dL   43   VLDL, calculated      5 - 40 mg/dL   30   LDL, calculated      0 - 99 mg/dL   125 (H)   Creatinine, urine      Not Estab. mg/dL  70.5    Microalbumin, urine      Not Estab. ug/mL  21.9    Microalbumin/Creat. Ratio      0.0 - 30.0 mg/g creat  31.1 (H)    Hemoglobin A1c, (calculated)      4.8 - 5.6 % 6.2 (H)     Estimated average glucose      mg/dL 131     Magnesium      1.6 - 2.3 mg/dL          Assessment/Plan:   1. Type 2 diabetes mellitus with microalbuminuria, with long-term current use of insulin (HCC)   - appears well controlled still - stable  - continue Lantus 55 units, Bydureon and metformin. He is adamant he is not having any hypoglycemia. 2. Essential hypertension   - continue amlodipine/telmisartan   3. Dyslipidemia   - continue atorvastatin.    4. Low magnesium level   - suggested it may be better for him to take 400 mg 4 times a daily rather than 800 twice daily, but taking magnesium 4 times daily may not be realistic   5. BMI 40.0-44.9, adult ([facility]    6. Microalbuminuria   - improving. Encouraged blood pressure, diabetes control and continued weight loss. Patient Instructions   Diabetes type II. Continue metformin   Continue Bydureon 2 mg weekly  Continue Latnus 55 units     Continue dietary efforts. Blood pressure:  Continue amlodipine + telmisartan - > take at bedtime    Low magnesium:  Continue taking 1600 mg per day. See if you can take 400 mg breakfast, lunch, supper and bedtime OR  Continue 800 mg twice daily. Follow-up Disposition:  Return in about 6 months (around 5/15/2018).     Copy sent to:

## 2017-11-16 LAB
ALBUMIN SERPL-MCNC: 4.4 G/DL (ref 3.5–4.7)
ALBUMIN/CREAT UR: 20.2 MG/G CREAT (ref 0–30)
ALBUMIN/GLOB SERPL: 1.5 {RATIO} (ref 1.2–2.2)
ALP SERPL-CCNC: 65 IU/L (ref 39–117)
ALT SERPL-CCNC: 20 IU/L (ref 0–44)
AST SERPL-CCNC: 47 IU/L (ref 0–40)
BILIRUB SERPL-MCNC: 0.5 MG/DL (ref 0–1.2)
BUN SERPL-MCNC: 15 MG/DL (ref 8–27)
BUN/CREAT SERPL: 14 (ref 10–24)
CALCIUM SERPL-MCNC: 9.7 MG/DL (ref 8.6–10.2)
CHLORIDE SERPL-SCNC: 99 MMOL/L (ref 96–106)
CHOLEST SERPL-MCNC: 217 MG/DL (ref 100–199)
CO2 SERPL-SCNC: 22 MMOL/L (ref 18–29)
CREAT SERPL-MCNC: 1.07 MG/DL (ref 0.76–1.27)
CREAT UR-MCNC: 109.9 MG/DL
EST. AVERAGE GLUCOSE BLD GHB EST-MCNC: 134 MG/DL
GFR SERPLBLD CREATININE-BSD FMLA CKD-EPI: 64 ML/MIN/1.73
GFR SERPLBLD CREATININE-BSD FMLA CKD-EPI: 74 ML/MIN/1.73
GLOBULIN SER CALC-MCNC: 2.9 G/DL (ref 1.5–4.5)
GLUCOSE SERPL-MCNC: 171 MG/DL (ref 65–99)
HBA1C MFR BLD: 6.3 % (ref 4.8–5.6)
HDLC SERPL-MCNC: 45 MG/DL
INTERPRETATION, 910389: NORMAL
LDLC SERPL CALC-MCNC: 130 MG/DL (ref 0–99)
Lab: NORMAL
MICROALBUMIN UR-MCNC: 22.2 UG/ML
POTASSIUM SERPL-SCNC: 4.9 MMOL/L (ref 3.5–5.2)
PROT SERPL-MCNC: 7.3 G/DL (ref 6–8.5)
SODIUM SERPL-SCNC: 138 MMOL/L (ref 134–144)
TRIGL SERPL-MCNC: 210 MG/DL (ref 0–149)
VLDLC SERPL CALC-MCNC: 42 MG/DL (ref 5–40)

## 2017-11-17 NOTE — PROGRESS NOTES
Will mail lab letter. AST has been high normal, now is a little elevated. Will encourage weight loss and less alcohol, if he is consuming.

## 2017-11-21 ENCOUNTER — OFFICE VISIT (OUTPATIENT)
Dept: FAMILY MEDICINE CLINIC | Age: 82
End: 2017-11-21

## 2017-11-21 VITALS
HEART RATE: 82 BPM | BODY MASS INDEX: 41.23 KG/M2 | WEIGHT: 278.4 LBS | DIASTOLIC BLOOD PRESSURE: 70 MMHG | OXYGEN SATURATION: 100 % | RESPIRATION RATE: 16 BRPM | TEMPERATURE: 96.7 F | SYSTOLIC BLOOD PRESSURE: 135 MMHG | HEIGHT: 69 IN

## 2017-11-21 DIAGNOSIS — D68.59 HYPERCOAGULABLE STATE (HCC): Primary | ICD-10-CM

## 2017-11-21 DIAGNOSIS — Z86.718 HISTORY OF VENOUS THROMBOEMBOLISM: ICD-10-CM

## 2017-11-21 DIAGNOSIS — Z51.81 ENCOUNTER FOR MONITORING COUMADIN THERAPY: ICD-10-CM

## 2017-11-21 DIAGNOSIS — Z79.01 ENCOUNTER FOR MONITORING COUMADIN THERAPY: ICD-10-CM

## 2017-11-21 LAB
INR BLD: 1.8
PT POC: NORMAL SECONDS
VALID INTERNAL CONTROL?: YES

## 2017-11-21 NOTE — PROGRESS NOTES
Subjective:     Chief Complaint   Patient presents with    Coagulation disorder      He  is a 80 y.o. male who presents for evaluation of:  Here for INR check. 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 See anti-coag tab for history and dosing with plan. Doing better since fall with no further sig pain or bruising. Decided not to get bx of LLE lesion. Pmhx sig for DM2 (seeing Dr. Alva Mireles), VTE x 2 on chronic coumadin, GERD, and chronic pain syndrome from back issue many yrs ago. Diabetes has been well controlled with Lantus, Metformin, and Bydureon. ROS per HPI    Past Medical History:   Diagnosis Date    Back injury 1994    Diabetes ([address]     Muscle cramps     Reflux     Thromboembolus ([address]      Past Surgical History:   Procedure Laterality Date    HX ORTHOPAEDIC Left     Crushed/left index finger amputee    HX OTHER SURGICAL      hand surgery, heal spur     Current Outpatient Prescriptions on File Prior to Visit   Medication Sig Dispense Refill    exenatide microspheres (BYDUREON) 2 mg/0.65 mL pnij 2 mg by SubCUTAneous route every seven (7) days. For diabetes 8 mL 3    famotidine (PEPCID) 20 mg tablet Take 1 Tab by mouth two (2) times a day. For stomach acid. 180 Tab 3    Telmisartan-amLODIPine 80-5 mg tab TAKE 1 TABLET NIGHTLY FOR BLOOD PRESSURE 90 Tab 1    metFORMIN (GLUCOPHAGE) 1,000 mg tablet Take 1 Tab by mouth two (2) times a day. 180 Tab 1    insulin glargine (LANTUS SOLOSTAR) 100 unit/mL (3 mL) inpn 55 Units by SubCUTAneous route daily. 15 Pen 3    diclofenac (VOLTAREN) 1 % gel Apply  to affected area four (4) times daily. 100 g 0    finasteride (PROSCAR) 5 mg tablet TAKE 1 TABLET DAILY FOR SYMPTOMATIC BENIGN PROSTATIC HYPERPLASIA 30 Tab 4    cholecalciferol, VITAMIN D3, (VITAMIN D3) 5,000 unit tab tablet Take  by mouth daily. Nature Made      cyanocobalamin (VITAMIN B-12) 1,000 mcg tablet Take 1,000 mcg by mouth daily.  Nature Made      magnesium oxide (MAG-OX) 400 mg tablet Take 1 Tab by mouth four (4) times daily. For low magnesium (Patient taking differently: Take 1,600 mg by mouth daily. For low magnesium) 360 Tab 3    Insulin Needles, Disposable, (BD INSULIN PEN NEEDLE UF MINI) 31 x 3/16 \" ndle 1 each by SubCUTAneous route daily. 100 each 3    cyclobenzaprine (FLEXERIL) 10 mg tablet Take  by mouth three (3) times daily as needed for Muscle Spasm(s).  COUMADIN 4 mg tablet TAKE 2 TABLETS DAILY 180 Tab 2    ALTAGRACIA PEN NEEDLE 32 gauge x 5/32\" ndle        No current facility-administered medications on file prior to visit. Objective:     Vitals:    11/21/17 1124   BP: 135/70   Pulse: 82   Resp: 16   Temp: 96.7 °F (35.9 °C)   TempSrc: Oral   SpO2: 100%   Weight: 278 lb 6.4 oz (126.3 kg)   Height: 5' 8.5\" (1.74 m)     Physical Examination:  General appearance - alert, well appearing, and in no distress  Eyes -sclera anicteric  Neck - supple, no significant adenopathy, no thyromegaly  Chest - clear to auscultation, no wheezes, rales or rhonchi, symmetric air entry  Heart - normal rate, regular rhythm, normal S1, S2, no murmurs, rubs, clicks or gallops  Neurological - alert, oriented, no focal findings or movement disorder noted  Extr - trace edema, LLE with poorly healing 1 cm lesion    Assessment/ Plan:   Diagnoses and all orders for this visit:    1. Hypercoagulable state ([address]  2. History of recurrent deep vein thrombosis (DVT)  3. Encounter for monitoring coumadin therapy  INR supratherapeutic at last appt and now slightly subtherapeutic. He declines bx today and wants to restart same coumadin dose so discussed doing this and will adjust further weekly dose at next appt if he again becomes supratherapeutic  -     AMB POC PT/INR    I have discussed the diagnosis with the patient and the intended plan as seen in the above orders. The patient has received an after-visit summary and questions were answered concerning future plans.   I have discussed medication side effects and warnings with the patient as well. The patient verbalizes understanding and agreement with the plan. Follow-up Disposition:  Return in about 2 weeks (around 12/5/2017), or if symptoms worsen or fail to improve.

## 2017-11-21 NOTE — PROGRESS NOTES
Chief Complaint   Patient presents with    Coagulation disorder     Patient here to follow up on coumadin. Patient states he has not taken coumadin for three days.

## 2018-01-23 DIAGNOSIS — N40.1 BENIGN PROSTATIC HYPERPLASIA WITH LOWER URINARY TRACT SYMPTOMS: ICD-10-CM

## 2018-01-23 RX ORDER — FINASTERIDE 5 MG/1
TABLET, FILM COATED ORAL
Qty: 30 TAB | Refills: 4 | Status: SHIPPED | OUTPATIENT
Start: 2018-01-23 | End: 2018-04-18

## 2018-02-14 RX ORDER — PEN NEEDLE, DIABETIC 32GX 5/32"
NEEDLE, DISPOSABLE MISCELLANEOUS
Qty: 90 PEN NEEDLE | Refills: 3 | Status: SHIPPED | OUTPATIENT
Start: 2018-02-14 | End: 2018-08-16

## 2018-04-18 ENCOUNTER — OFFICE VISIT (OUTPATIENT)
Dept: FAMILY MEDICINE CLINIC | Age: 83
End: 2018-04-18

## 2018-04-18 VITALS
DIASTOLIC BLOOD PRESSURE: 65 MMHG | TEMPERATURE: 97 F | HEIGHT: 69 IN | WEIGHT: 278.2 LBS | OXYGEN SATURATION: 98 % | SYSTOLIC BLOOD PRESSURE: 142 MMHG | HEART RATE: 80 BPM | BODY MASS INDEX: 41.2 KG/M2 | RESPIRATION RATE: 24 BRPM

## 2018-04-18 DIAGNOSIS — E66.01 OBESITY, MORBID (HCC): ICD-10-CM

## 2018-04-18 DIAGNOSIS — B35.1 ONYCHOMYCOSIS: ICD-10-CM

## 2018-04-18 DIAGNOSIS — Z79.01 ENCOUNTER FOR MONITORING COUMADIN THERAPY: ICD-10-CM

## 2018-04-18 DIAGNOSIS — E83.42 HYPOMAGNESEMIA: ICD-10-CM

## 2018-04-18 DIAGNOSIS — K21.9 GASTROESOPHAGEAL REFLUX DISEASE, ESOPHAGITIS PRESENCE NOT SPECIFIED: ICD-10-CM

## 2018-04-18 DIAGNOSIS — E11.9 TYPE 2 DIABETES MELLITUS WITHOUT COMPLICATION, WITH LONG-TERM CURRENT USE OF INSULIN (HCC): ICD-10-CM

## 2018-04-18 DIAGNOSIS — R35.81 NOCTURNAL POLYURIA: ICD-10-CM

## 2018-04-18 DIAGNOSIS — I10 ESSENTIAL HYPERTENSION: ICD-10-CM

## 2018-04-18 DIAGNOSIS — Z79.4 TYPE 2 DIABETES MELLITUS WITHOUT COMPLICATION, WITH LONG-TERM CURRENT USE OF INSULIN (HCC): ICD-10-CM
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
## 2018-04-18 DIAGNOSIS — G89.29 CHRONIC LEFT SHOULDER PAIN: ICD-10-CM

## 2018-04-18 DIAGNOSIS — Z13.31 SCREENING FOR DEPRESSION: ICD-10-CM

## 2018-04-18 DIAGNOSIS — Z51.81 ENCOUNTER FOR MONITORING COUMADIN THERAPY: ICD-10-CM

## 2018-04-18 DIAGNOSIS — E78.5 HYPERLIPIDEMIA LDL GOAL <100: ICD-10-CM

## 2018-04-18 DIAGNOSIS — Z13.39 SCREENING FOR ALCOHOLISM: ICD-10-CM

## 2018-04-18 DIAGNOSIS — M25.512 CHRONIC LEFT SHOULDER PAIN: ICD-10-CM

## 2018-04-18 DIAGNOSIS — R06.09 DOE (DYSPNEA ON EXERTION): ICD-10-CM

## 2018-04-18 DIAGNOSIS — R13.10 DYSPHAGIA, UNSPECIFIED TYPE: ICD-10-CM

## 2018-04-18 DIAGNOSIS — M21.619 BUNION OF GREAT TOE: ICD-10-CM

## 2018-04-18 DIAGNOSIS — D68.59 HYPERCOAGULABLE STATE (HCC): ICD-10-CM

## 2018-04-18 DIAGNOSIS — Z86.718 HISTORY OF RECURRENT DEEP VEIN THROMBOSIS (DVT): ICD-10-CM

## 2018-04-18 DIAGNOSIS — Z00.00 MEDICARE ANNUAL WELLNESS VISIT, SUBSEQUENT: Primary | ICD-10-CM

## 2018-04-18 LAB
INR BLD: 2.9
PT POC: NORMAL SECONDS
VALID INTERNAL CONTROL?: YES

## 2018-04-18 RX ORDER — LANSOPRAZOLE 30 MG/1
30 CAPSULE, DELAYED RELEASE ORAL
Qty: 90 CAP | Refills: 0 | Status: ON HOLD | OUTPATIENT
Start: 2018-04-18 | End: 2018-06-14 | Stop reason: SDUPTHER

## 2018-04-18 NOTE — PROGRESS NOTES
Chief Complaint   Patient presents with   [facility] Annual Wellness Visit     (Medicare) & PT/INR Check     Patient would like to discuss the following complaints:  1. Having Indigestion-burping, belching, burning and feeling like food not going down at times (states that last med. Lansoprazole helped symptoms)  2. Left Arm Pain-fell in tub late September with continued pain  3.  Coughing-concerned about Chronic Productive Cough (yellowish) since 3/2017    Would like to discuss Finasteride medication - continued frequent urination at night  Room #4

## 2018-04-18 NOTE — MR AVS SNAPSHOT
[facility] [address] [facility] 
[phone] Patient: Melody Carrington MRN: [id number] TGF:0/04/3708 Visit Information Date & Time Provider Department Dept. Phone Encounter #  
 4/18/2018 10:50 AM Maria Victoria Rosenthal MD [facility] at [address] [id number] Follow-up Instructions Return in about 4 weeks (around 5/16/2018) for Regular Follow up. Your Appointments 5/16/2018 11:30 AM  
Follow Up with Keenan Ridley MD  
[facility] [address]) Appt Note: 6 month f/u  
 [address] [facility] [phone] [address] [place] [address] Road 7/19/2018  9:30 AM  
ROUTINE CARE with Maria Victoria Rosenthal MD  
[facility] at [facility] [address]) Appt Note: 3m fu  
 [address] [id number]  
[facility] Upcoming Health Maintenance Date Due  
 FOOT EXAM Q1 3/15/2018 MEDICARE YEARLY EXAM 3/21/2018 HEMOGLOBIN A1C Q6M 5/15/2018 Pneumococcal 65+ Low/Medium Risk (2 of 2 - PPSV23) 5/18/2018 MICROALBUMIN Q1 11/15/2018 LIPID PANEL Q1 11/15/2018 EYE EXAM RETINAL OR DILATED Q1 11/30/2018 GLAUCOMA SCREENING Q2Y 11/30/2019 DTaP/Tdap/Td series (2 - Td) 5/18/2027 Allergies as of 4/18/2018  Review Complete On: 4/18/2018 By: Maria Victoria Rosenthal MD  
  
 Severity Noted Reaction Type Reactions Statins-hmg-coa Reductase Inhibitors  03/23/2016   Side Effect Myalgia Tried several statins. Current Immunizations  Never Reviewed Name Date Influenza High Dose Vaccine PF 10/5/2017 Not reviewed this visit You Were Diagnosed With   
  
 Codes Comments Medicare annual wellness visit, subsequent    -  Primary ICD-10-CM: Z00.00 ICD-9-CM: V70.0 Screening for alcoholism     ICD-10-CM: Z13.89 ICD-9-CM: V79.1 Screening for depression     ICD-10-CM: Z13.89 ICD-9-CM: V79.0 Hypercoagulable state ([facility]     ICD-10-CM: X19.37 
ICD-9-CM: 289.81 History of recurrent deep vein thrombosis (DVT)     ICD-10-CM: I54.464 ICD-9-CM: V12.51 Encounter for monitoring coumadin therapy     ICD-10-CM: Z51.81, Z79.01 
ICD-9-CM: V58.83, V58.61 Essential hypertension     ICD-10-CM: I10 
ICD-9-CM: 401.9 Type 2 diabetes mellitus without complication, with long-term current use of insulin (HCC)     ICD-10-CM: E11.9, Z79.4 ICD-9-CM: 250.00, V58.67 Hyperlipidemia LDL goal <100     ICD-10-CM: E78.5 ICD-9-CM: 272.4 Gastroesophageal reflux disease, esophagitis presence not specified     ICD-10-CM: K21.9 ICD-9-CM: 530.81 Dysphagia, unspecified type     ICD-10-CM: R13.10 ICD-9-CM: 787.20 Obesity, morbid ([facility]     ICD-10-CM: E66.01 
ICD-9-CM: 278.01 Nocturnal polyuria     ICD-10-CM: R35.1 ICD-9-CM: 788.43 Bunion of great toe     ICD-10-CM: M21.619 ICD-9-CM: 727.1 Onychomycosis     ICD-10-CM: B35.1 ICD-9-CM: 110.1 Fall, sequela     ICD-10-CM: W19. XXXS 
ICD-9-CM: 909.4, E929.3 Chronic left shoulder pain     ICD-10-CM: M25.512, G89.29 ICD-9-CM: 719.41, 338.29 Hypomagnesemia     ICD-10-CM: V13.98 
ICD-9-CM: 275.2 DIAZ (dyspnea on exertion)     ICD-10-CM: R06.09 
ICD-9-CM: 786.09 Vitals BP Pulse Temp Resp Height(growth percentile) Weight(growth percentile) 142/65 (BP 1 Location: Left arm, BP Patient Position: Sitting) 80 97 °F (36.1 °C) (Oral) 24 5' 8.5\" (1.74 m) 278 lb 3.2 oz (126.2 kg) SpO2 BMI Smoking Status 98% 41.68 kg/m2 Former Smoker Vitals History BMI and BSA Data Body Mass Index Body Surface Area  
 41.68 kg/m 2 2.47 m 2 Preferred Pharmacy Pharmacy Name Phone  [facility], [facility] [phone] Your Updated Medication List  
  
   
This list is accurate as of 4/18/18 12:59 PM.  Always use your most recent med list.  
  
  
  
  
 COUMADIN 4 mg tablet Generic drug:  warfarin TAKE 2 TABLETS DAILY  
  
 diclofenac 1 % Gel Commonly known as:  VOLTAREN Apply  to affected area four (4) times daily. exenatide microspheres 2 mg/0.65 mL Pnij Commonly known as:  BYDUREON  
2 mg by SubCUTAneous route every seven (7) days. For diabetes FLEXERIL 10 mg tablet Generic drug:  cyclobenzaprine Take  by mouth three (3) times daily as needed for Muscle Spasm(s). insulin glargine 100 unit/mL (3 mL) Inpn Commonly known as:  LANTUS SOLOSTAR U-100 INSULIN  
55 Units by SubCUTAneous route daily. Insulin Needles (Disposable) 31 gauge x 3/16\" Ndle Commonly known as:  BD INSULIN PEN NEEDLE UF MINI  
1 each by SubCUTAneous route daily. Linda Pen Needle 32 gauge x 5/32\" Ndle Generic drug:  Insulin Needles (Disposable) USE 1 NEEDLE UNDER THE SKIN DAILY  
  
 lansoprazole 30 mg capsule Commonly known as:  PREVACID Take 1 Cap by mouth Daily (before breakfast). magnesium oxide 400 mg tablet Commonly known as:  MAG-OX Take 1 Tab by mouth four (4) times daily. For low magnesium  
  
 metFORMIN 1,000 mg tablet Commonly known as:  GLUCOPHAGE Take 1 Tab by mouth two (2) times a day. Telmisartan-amLODIPine 80-5 mg Tab TAKE 1 TABLET NIGHTLY FOR BLOOD PRESSURE  
  
 VITAMIN B-12 1,000 mcg tablet Generic drug:  cyanocobalamin Take 1,000 mcg by mouth daily. Nature Made VITAMIN D3 5,000 unit Tab tablet Generic drug:  cholecalciferol (VITAMIN D3) Take  by mouth daily. [employer] Made Prescriptions Sent to Pharmacy Refills  
 lansoprazole (PREVACID) 30 mg capsule 0 Sig: Take 1 Cap by mouth Daily (before breakfast).   
 Class: Normal  
 Pharmacy: [address], [address] [facility] [address] [facility] #: [phone] Route: Oral  
  
April 2018 Details Sun Mon Tue Wed Thu Fri Sat  
  1  
  
  
  
   2  
  
  
  
   3  
  
  
  
   4  
  
  
  
   5  
  
  
  
   6  
  
  
  
   7  
  
  
  
  
  8  
  
  
  
   9  
  
  
  
   10  
  
  
  
   11  
  
  
  
   12  
  
  
  
   13  
  
  
  
   14  
  
  
  
  
  15  
  
  
  
   16  
  
  
  
   17  
  
  
  
   18  
  
8 mg See details 19  
  
8 mg  
  
   20  
  
8 mg  
  
   21  
  
8 mg  
  
  
  22  
  
8 mg  
  
   23  
  
8 mg  
  
   24  
  
8 mg  
  
   25  
  
8 mg  
  
   26  
  
8 mg  
  
   27  
  
8 mg  
  
   28  
  
8 mg  
  
  
  29  
  
8 mg  
  
   30  
  
8 mg Date Details 04/18 This INR check INR: 2.9 How to take your warfarin dose To take:  8 mg Take two of the 4 mg tablets. May 2018 Details Gabrielle Curling Tue Wed Thu Fri Sat 1  
  
8 mg 2  
  
8 mg 3  
  
8 mg 4  
  
8 mg 5  
  
8 mg 6  
  
8 mg 7  
  
8 mg 8  
  
8 mg 9  
  
8 mg  
  
   10  
  
8 mg  
  
   11  
  
8 mg  
  
   12  
  
8 mg  
  
  
  13  
  
8 mg  
  
   14  
  
8 mg  
  
   15  
  
8 mg  
  
   16  
  
8 mg  
  
   17  
  
  
  
   18  
  
  
  
   19  
  
  
  
  
  20  
  
  
  
   21  
  
  
  
   22  
  
  
  
   23  
  
  
  
   24  
  
  
  
   25  
  
  
  
   26  
  
  
  
  
  27  
  
  
  
   28  
  
  
  
   29  
  
  
  
   30  
  
  
  
   31  
  
  
  
    
 Date Details No additional details Date of next INR:  5/16/2018 How to take your warfarin dose To take:  8 mg Take two of the 4 mg tablets. We Performed the Following AMB POC EKG ROUTINE W/ 12 LEADS, INTER & REP [21705 CPT(R)] AMB POC PT/INR [88847 CPT(R)] AMB SUPPLY ORDER [[id number] Custom] Comments:  
 Please disp 1 Home INR machine for regular coumadin checks at home CBC W/O DIFF [82818 CPT(R)] Earnest 68 [[id number] [facility]] HEMOGLOBIN A1C WITH EAG [57709 CPT(R)] LIPID PANEL [61495 CPT(R)] MAGNESIUM [id number] CPT(R)] METABOLIC PANEL, COMPREHENSIVE [49995 CPT(R)] TN ANNUAL ALCOHOL SCREEN 15 MIN [id number] [facility]] REFERRAL TO GASTROENTEROLOGY [LBA15 Custom] Comments:  
 Chronic GERD with sig sx. Hx of hypomagnesemia and restarting Prevacid. Hx of stricture type problem years ago and had dilated. For EGD. TSH 3RD GENERATION [46685 CPT(R)] Follow-up Instructions Return in about 4 weeks (around 5/16/2018) for Regular Follow up. To-Do List   
 04/18/2018 ECHO:  2D ECHO COMPLETE ADULT (TTE) W OR WO CONTR   
  
 04/18/2018 Imaging:  XR SHOULDER LT AP/LAT MIN 2 V Referral Information Referral ID Referred By Referred To  
  
 [id number] Meghan Romero MD   
   [facility] [employer] [address] [facility] [address], [address] Phone: [phone] Fax: [phone] Visits Status Start Date End Date 1 New Request 4/18/18 4/18/19 If your referral has a status of pending review or denied, additional information will be sent to support the outcome of this decision. Patient Instructions Medicare Wellness Visit, Male The best way to live healthy is to have a healthy lifestyle by eating a well-balanced diet, exercising regularly, limiting alcohol and stopping smoking. Regular physical exams and screening tests are another way to keep healthy. Preventive exams provided by your health care provider can find health problems before they become diseases or illnesses. Preventive services including immunizations, screening tests, monitoring and exams can help you take care of your own health. All people over age 72 should have a pneumovax  and and a prevnar shot to prevent pneumonia. These are once in a lifetime unless you and your provider decide differently. All people over 65 should have a yearly flu shot and a tetanus vaccine every 10 years. Screening for diabetes mellitus with a blood sugar test should be done every year. Glaucoma is a disease of the eye due to increased ocular pressure that can lead to blindness and it should be done every year by an eye professional. 
 
Cardiovascular screening tests that check for elevated lipids (fatty part of blood) which can lead to heart disease and strokes should be done every 5 years. Colorectal screening that evaluates for blood or polyps in your colon should be done yearly as a stool test or every five years as a flexible sigmoidoscope or every 10 years as a colonoscopy up to age 76. Men up to age 76 may need a screening blood test for prostate cancer at certain intervals, depending on their personal and family history. This decision is between the patient and his provider. If you have been a smoker or had family history of abdominal aortic aneurysms, you and your provider may decide to schedule an ultrasound test of your aorta. Hepatitis C screening is also recommended for anyone born between [birth year] through [address]. A shingles vaccine is also recommended once in a lifetime after age 61. Your Medicare Wellness Exam is recommended annually. Here is a list of your current Health Maintenance items with a due date: 
Health Maintenance Due Topic Date Due  
 Diabetic Foot Care  03/15/2018 [facility] Annual Well Visit  03/21/2018  Hemoglobin A1C    05/15/2018 Introducing [facility]! [address] [facility] introduces [employer] patient portal. Now you can access parts of your medical record, email your doctor's office, and request medication refills online. 1. In your internet browser, go to [email or web address] 2. Click on the First Time User? Click Here link in the Sign In box. You will see the New Member Sign Up page. 3. Enter your [employer] Access Code exactly as it appears below. You will not need to use this code after youve completed the sign-up process. If you do not sign up before the expiration date, you must request a new code. · [employer] Access Code: [email or web address] Expires: 7/17/2018 12:59 PM 
 
4. Enter the last four digits of your Social Security Number (xxxx) and Date of Birth (mm/dd/yyyy) as indicated and click Submit. You will be taken to the next sign-up page. 5. Create a [employer] ID. This will be your [employer] login ID and cannot be changed, so think of one that is secure and easy to remember. 6. Create a [employer] password. You can change your password at any time. 7. Enter your Password Reset Question and Answer. This can be used at a later time if you forget your password. 8. Enter your e-mail address. You will receive e-mail notification when new information is available in [address] 9. Click Sign Up. You can now view and download portions of your medical record. 10. Click the Download Summary menu link to download a portable copy of your medical information. If you have questions, please visit the Frequently Asked Questions section of the [employer] website. Remember, [employer] is NOT to be used for urgent needs. For medical emergencies, dial 911. Now available from your iPhone and Android! Please provide this summary of care documentation to your next provider. Your primary care clinician is listed as Luz Marina November. If you have any questions after today's visit, please call [phone].

## 2018-04-18 NOTE — PROGRESS NOTES
Subjective:     Chief Complaint   Patient presents with    Annual Wellness Visit     (Medicare) & PT/INR Check      He  is a 80 y.o. male who presents for evaluation of:  Here for INR check. 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 See anti-coag tab for history and dosing with plan. Poor compliance with getting INR checked but interested in getting a home INR machine. Pmhx sig for DM2 (seeing Dr. Yuan Don), VTE x 2 on chronic coumadin, GERD, and chronic pain syndrome from back issue many yrs ago. Diabetes has been well controlled with Lantus, Metformin, and Bydureon. Has some neuropathy and does not see podiatry. Seeing Ophtho regularly. Hyperlipidemia & HTN  Pt is doing well on current meds with no medication side effects noted  No new myalgias, no joint pains, no weakness  No TIA's, no chest pain on exertion, no dyspnea on exertion, no swelling of ankles. Lab Results   Component Value Date/Time    Cholesterol, total 217 (H) 11/15/2017 12:20 PM    HDL Cholesterol 45 11/15/2017 12:20 PM    LDL, calculated 130 (H) 11/15/2017 12:20 PM    Triglyceride 210 (H) 11/15/2017 12:20 PM     ROS:  Constitutional: negative except for fevers, chills and fatigue  Eyes: negative for visual disturbance  Ears, nose, mouth, throat, and face: negative for hearing loss and sore throat  Respiratory: negative for cough or dyspnea on exertion  Cardiovascular: negative for chest pain, dyspnea, palpitations, fatigue  Gastrointestinal: Sig GERD sx off Prevacid. Tried to go off d/t hypomagnesemia with Dr. Yuan Don. Now feeling dysphagia and dealing with chronic cough. Hx of stricture and had dilated with Dr. Oriana Mcfarlane many yrs ago  Genitourinary: prev thought to have BPH but only has nocturia with sig volume. No stopping or starting, no straining, no weak stream.  No benefit from Proscar. Has seen Urology in past and thinks they wanted to put a catheter in.   Integument/breast: negative for rash and skin lesion(s)  Hematologic/lymphatic: negative for easy bruising and bleeding  Musculoskeletal:L shoulder and arm pain after having a fall > 6 months ago in a bathtub. Landed on this shoulder and has had pain ever since. Worse with overhead activity and + night pain. Neurological: negative for headaches and dizziness  Behavioral/Psych: negative for anxiety and depression    Past Medical History:   Diagnosis Date    Back injury 1994    Diabetes ([facility] [address]     Muscle cramps     Reflux     Thromboembolus ([address]      Past Surgical History:   Procedure Laterality Date    HX ORTHOPAEDIC Left     Crushed/left index finger amputee    HX OTHER SURGICAL      hand surgery, heal spur     Current Outpatient Prescriptions on File Prior to Visit   Medication Sig Dispense Refill    ALTAGRACIA PEN NEEDLE 32 gauge x 5/32\" ndle USE 1 NEEDLE UNDER THE SKIN DAILY 90 Pen Needle 3    exenatide microspheres (BYDUREON) 2 mg/0.65 mL pnij 2 mg by SubCUTAneous route every seven (7) days. For diabetes 8 mL 3    Telmisartan-amLODIPine 80-5 mg tab TAKE 1 TABLET NIGHTLY FOR BLOOD PRESSURE 90 Tab 1    metFORMIN (GLUCOPHAGE) 1,000 mg tablet Take 1 Tab by mouth two (2) times a day. 180 Tab 1    insulin glargine (LANTUS SOLOSTAR) 100 unit/mL (3 mL) inpn 55 Units by SubCUTAneous route daily. 15 Pen 3    COUMADIN 4 mg tablet TAKE 2 TABLETS DAILY 180 Tab 2    diclofenac (VOLTAREN) 1 % gel Apply  to affected area four (4) times daily. 100 g 0    cholecalciferol, VITAMIN D3, (VITAMIN D3) 5,000 unit tab tablet Take  by mouth daily. Nature Made      cyanocobalamin (VITAMIN B-12) 1,000 mcg tablet Take 1,000 mcg by mouth daily. Nature Made      magnesium oxide (MAG-OX) 400 mg tablet Take 1 Tab by mouth four (4) times daily. For low magnesium (Patient taking differently: Take 1,600 mg by mouth daily. For low magnesium) 360 Tab 3    Insulin Needles, Disposable, (BD INSULIN PEN NEEDLE UF MINI) 31 x 3/16 \" ndle 1 each by SubCUTAneous route daily.  100 each 3  cyclobenzaprine (FLEXERIL) 10 mg tablet Take  by mouth three (3) times daily as needed for Muscle Spasm(s). No current facility-administered medications on file prior to visit. Objective:     Vitals:    04/18/18 1110   BP: 142/65   Pulse: 80   Resp: 24   Temp: 97 °F (36.1 °C)   TempSrc: Oral   SpO2: 98%   Weight: 278 lb 3.2 oz (126.2 kg)   Height: 5' 8.5\" (1.74 m)     Physical Examination:  General appearance - alert, well appearing, and in no distress  Eyes -sclera anicteric  Mouth - nml oropharynx  Neck - supple, no significant adenopathy, no thyromegaly  Chest - clear to auscultation, no wheezes, rales or rhonchi, symmetric air entry  Heart - normal rate, regular rhythm, normal S1, S2, no murmurs, rubs, clicks or gallops  Neurological - alert, oriented, no focal findings or movement disorder noted  Abd - soft, NT, ND  L shoulder - pain with full flexion, neg neers, neg liu, + apley scratch  Extr - trace edema, LLE with poorly healing 1 cm lesion  Feet - + onychomycosis and bunions bilat    Assessment/ Plan:   Diagnoses and all orders for this visit:    1. Medicare annual wellness visit, subsequent  2. Screening for alcoholism  -     Annual  Alcohol Screen 15 min ()  3. Screening for depression  -     Depression Screen Annual    4. Hypercoagulable state ([facility] [address]  5. History of recurrent deep vein thrombosis (DVT)  6. Encounter for monitoring coumadin therapy  - Poor compliance with INR checks but therapeutic today. -     AMB POC PT/INR  -     AMB SUPPLY ORDER    7. Essential hypertension - controlled  -     METABOLIC PANEL, COMPREHENSIVE    8. Type 2 diabetes mellitus without complication, with long-term current use of insulin (HCC) - controlled, following with Dr. Yanna Adrian, COMPREHENSIVE  -     TSH 3RD GENERATION    9. Hyperlipidemia LDL goal <100  -     HEMOGLOBIN A1C WITH EAG  -     LIPID PANEL  -     METABOLIC PANEL, COMPREHENSIVE    10. Gastroesophageal reflux disease, esophagitis presence not specified - ct to c/o sig sx so will restart on Prevacid and send to GI.  ? Hiatal Hernia  -     lansoprazole (PREVACID) 30 mg capsule; Take 1 Cap by mouth Daily (before breakfast). [place] [facility]  -     CBC W/O DIFF    11. Dysphagia, unspecified type - will send to GI for this as I suspect  -     [facility]  -     CBC W/O DIFF    12. Obesity, morbid ([address]  Discussed the patient's BMI. The BMI follow up plan is as follows:   dietary management education, guidance, and counseling  encourage exercise  monitor weight  prescribed dietary intake  -     LIPID PANEL  -     TSH 3RD GENERATION    13. Nocturnal polyuria - obtaining records from Urology    14. Bunion of great toe  15. Onychomycosis    16. Fall, sequela  17. Chronic left shoulder pain  - X rays today. Concern for RC tear given trauma. Likely some OA too. -     XR SHOULDER LT AP/LAT MIN 2 V; Future    18. Hypomagnesemia - rechecking levels today, chronically slightly decreased  -     MAGNESIUM    19. DIAZ (dyspnea on exertion) - EKG abnormal so getting 2D echo  -     HEMOGLOBIN A1C WITH EAG  -     LIPID PANEL  -     TSH 3RD GENERATION  -     AMB POC EKG ROUTINE W/ 12 LEADS, INTER & REP    I have discussed the diagnosis with the patient and the intended plan as seen in the above orders. The patient has received an after-visit summary and questions were answered concerning future plans. I have discussed medication side effects and warnings with the patient as well. The patient verbalizes understanding and agreement with the plan. Follow-up Disposition:  Return in about 4 weeks (around 5/16/2018) for Regular Follow up. This is the Subsequent Medicare Annual Wellness Exam, performed 12 months or more after the Initial AWV or the last Subsequent AWV    I have reviewed the patient's medical history in detail and updated the computerized patient record.      History     Past Medical History:   Diagnosis Date    Back injury 1994    Diabetes ([address]     Muscle cramps     Reflux     Thromboembolus (HCC)       Past Surgical History:   Procedure Laterality Date    HX ORTHOPAEDIC Left     Crushed/left index finger amputee    HX OTHER SURGICAL      hand surgery, heal spur     Current Outpatient Prescriptions   Medication Sig Dispense Refill    lansoprazole (PREVACID) 30 mg capsule Take 1 Cap by mouth Daily (before breakfast). 90 Cap 0    ALTAGRACIA PEN NEEDLE 32 gauge x 5/32\" ndle USE 1 NEEDLE UNDER THE SKIN DAILY 90 Pen Needle 3    exenatide microspheres (BYDUREON) 2 mg/0.65 mL pnij 2 mg by SubCUTAneous route every seven (7) days. For diabetes 8 mL 3    Telmisartan-amLODIPine 80-5 mg tab TAKE 1 TABLET NIGHTLY FOR BLOOD PRESSURE 90 Tab 1    metFORMIN (GLUCOPHAGE) 1,000 mg tablet Take 1 Tab by mouth two (2) times a day. 180 Tab 1    insulin glargine (LANTUS SOLOSTAR) 100 unit/mL (3 mL) inpn 55 Units by SubCUTAneous route daily. 15 Pen 3    COUMADIN 4 mg tablet TAKE 2 TABLETS DAILY 180 Tab 2    diclofenac (VOLTAREN) 1 % gel Apply  to affected area four (4) times daily. 100 g 0    cholecalciferol, VITAMIN D3, (VITAMIN D3) 5,000 unit tab tablet Take  by mouth daily. Nature Made      cyanocobalamin (VITAMIN B-12) 1,000 mcg tablet Take 1,000 mcg by mouth daily. Nature Made      magnesium oxide (MAG-OX) 400 mg tablet Take 1 Tab by mouth four (4) times daily. For low magnesium (Patient taking differently: Take 1,600 mg by mouth daily. For low magnesium) 360 Tab 3    Insulin Needles, Disposable, (BD INSULIN PEN NEEDLE UF MINI) 31 x 3/16 \" ndle 1 each by SubCUTAneous route daily. 100 each 3    cyclobenzaprine (FLEXERIL) 10 mg tablet Take  by mouth three (3) times daily as needed for Muscle Spasm(s). Allergies   Allergen Reactions    Statins-Hmg-Coa Reductase Inhibitors Myalgia     Tried several statins.       Family History   Problem Relation Age of Onset    Heart Disease Brother    [facility] Ble[facility] Prob Father     Tuberculosis Sister      Social History   Substance Use Topics    Smoking status: Former Smoker     Quit date: 1/1/1975    Smokeless tobacco: Never Used    Alcohol use Yes      Comment: once in a while     Patient Active Problem List   Diagnosis Code    Diabetes mellitus, type II ([facility] [address] E11.9    Gastroesophageal reflux disease K21.9    History of venous thromboembolism Z86.718    Encounter for monitoring coumadin therapy Z51.81, Z79.01    Essential hypertension I10    Obesity, morbid ([facility] [address] E66.01       Depression Risk Factor Screening:     PHQ over the last two weeks 4/18/2018   Little interest or pleasure in doing things Not at all   Feeling down, depressed or hopeless Not at all   Total Score PHQ 2 0     Alcohol Risk Factor Screening: You do not drink alcohol or very rarely. Functional Ability and Level of Safety:   Hearing Loss  Hearing is good. Activities of Daily Living  The home contains: no safety equipment. Patient does total self care    Fall Risk  Fall Risk Assessment, last 12 mths 4/18/2018   Able to walk? Yes   Fall in past 12 months? Yes   Fall with injury? Yes   Number of falls in past 12 months 1   Fall Risk Score 2     Abuse Screen  Patient is not abused    Cognitive Screening   Evaluation of Cognitive Function:  Has your family/caregiver stated any concerns about your memory: no  Normal    Patient Care Team   Patient Care Team:  Denzel Singh MD as PCP - General (Family Practice)  Virgilio Mcrae MD (Ophthalmology)  Jayme Robbins MD as Consulting Provider (Endocrinology)  Nellie Anderson MD (Orthopedic Surgery)  Marilee Goode MD (Otolaryngology)    Assessment/Plan   Education and counseling provided:  Are appropriate based on today's review and evaluation    Diagnoses and all orders for this visit:    1. Medicare annual wellness visit, subsequent    2. Screening for alcoholism  -     Annual  Alcohol Screen 15 min ()    3.  Screening for depression  -     Depression Screen Annual    4. Hypercoagulable state ([facility]  -     AMB POC PT/INR  -     AMB SUPPLY ORDER    5. History of recurrent deep vein thrombosis (DVT)  -     AMB POC PT/INR  -     AMB SUPPLY ORDER    6. Encounter for monitoring coumadin therapy  -     AMB POC PT/INR  -     AMB SUPPLY ORDER    7. Essential hypertension  -     METABOLIC PANEL, COMPREHENSIVE    8. Type 2 diabetes mellitus without complication, with long-term current use of insulin (HCC)  -     LIPID PANEL  -     METABOLIC PANEL, COMPREHENSIVE  -     TSH 3RD GENERATION    9. Hyperlipidemia LDL goal <100  -     HEMOGLOBIN A1C WITH EAG  -     LIPID PANEL  -     METABOLIC PANEL, COMPREHENSIVE    10. Gastroesophageal reflux disease, esophagitis presence not specified  -     lansoprazole (PREVACID) 30 mg capsule; Take 1 Cap by mouth Daily (before breakfast). [facility]  -     CBC W/O DIFF    11. Dysphagia, unspecified type  -     Seamon Gastro [facility]  -     CBC W/O DIFF    12. Obesity, morbid ([facility]  -     LIPID PANEL  -     TSH 3RD GENERATION    13. Nocturnal polyuria    14. Bunion of great toe    15. Onychomycosis    16. Fall, sequela  -     XR SHOULDER LT AP/LAT MIN 2 V; Future    17. Chronic left shoulder pain  -     XR SHOULDER LT AP/LAT MIN 2 V; Future    18. Hypomagnesemia  -     MAGNESIUM    19.  DIAZ (dyspnea on exertion)  -     HEMOGLOBIN A1C WITH EAG  -     LIPID PANEL  -     TSH 3RD GENERATION  -     AMB POC EKG ROUTINE W/ 12 LEADS, INTER & REP      Health Maintenance Due   Topic Date Due    FOOT EXAM Q1  03/15/2018    MEDICARE YEARLY EXAM  03/21/2018    HEMOGLOBIN A1C Q6M  05/15/2018

## 2018-04-18 NOTE — PATIENT INSTRUCTIONS

## 2018-04-19 ENCOUNTER — TELEPHONE (OUTPATIENT)
Dept: FAMILY MEDICINE CLINIC | Age: 83
End: 2018-04-19

## 2018-04-19 ENCOUNTER — DOCUMENTATION ONLY (OUTPATIENT)
Dept: FAMILY MEDICINE CLINIC | Age: 83
End: 2018-04-19

## 2018-04-19 ENCOUNTER — HOSPITAL ENCOUNTER (OUTPATIENT)
Dept: GENERAL RADIOLOGY | Age: 83
Discharge: HOME | End: 2018-04-19
Payer: MEDICARE
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
## 2018-04-19 DIAGNOSIS — M25.512 CHRONIC LEFT SHOULDER PAIN: ICD-10-CM

## 2018-04-19 DIAGNOSIS — G89.29 CHRONIC LEFT SHOULDER PAIN: ICD-10-CM

## 2018-04-19 PROCEDURE — 73030 X-RAY EXAM OF SHOULDER: CPT

## 2018-04-19 NOTE — TELEPHONE ENCOUNTER
----- Message from Debra Block sent at 4/19/2018  1:54 PM EDT -----  Regarding: /Telephone   Obi Bailey, wife stated that [employer] needs information on the equipment that the doctor is trying to give her . (P) [phone]. Best contact number is [phone].

## 2018-04-19 NOTE — TELEPHONE ENCOUNTER
----- Message from Brenna Ken sent at 4/19/2018  1:52 PM EDT -----  Regarding: Dr. Kole Vaca) called regarding a Coumadin test machine prescribed for the pt. Please call pt back and provide the name of the machine/ per Zahraa Dugan.

## 2018-04-19 NOTE — TELEPHONE ENCOUNTER
Patient advised to call insurance regarding DME supplier for PT/INR machine. After finding information call office back.

## 2018-04-20 LAB
ALBUMIN SERPL-MCNC: 4 G/DL (ref 3.5–4.7)
ALBUMIN/GLOB SERPL: 1.4 {RATIO} (ref 1.2–2.2)
ALP SERPL-CCNC: 61 IU/L (ref 39–117)
ALT SERPL-CCNC: 18 IU/L (ref 0–44)
AST SERPL-CCNC: 37 IU/L (ref 0–40)
BILIRUB SERPL-MCNC: 0.6 MG/DL (ref 0–1.2)
BUN SERPL-MCNC: 17 MG/DL (ref 8–27)
BUN/CREAT SERPL: 14 (ref 10–24)
CALCIUM SERPL-MCNC: 9.6 MG/DL (ref 8.6–10.2)
CHLORIDE SERPL-SCNC: 98 MMOL/L (ref 96–106)
CHOLEST SERPL-MCNC: 217 MG/DL (ref 100–199)
CO2 SERPL-SCNC: 21 MMOL/L (ref 18–29)
CREAT SERPL-MCNC: 1.2 MG/DL (ref 0.76–1.27)
ERYTHROCYTE [DISTWIDTH] IN BLOOD BY AUTOMATED COUNT: 15.1 % (ref 12.3–15.4)
EST. AVERAGE GLUCOSE BLD GHB EST-MCNC: 134 MG/DL
GFR SERPLBLD CREATININE-BSD FMLA CKD-EPI: 56 ML/MIN/1.73
GFR SERPLBLD CREATININE-BSD FMLA CKD-EPI: 64 ML/MIN/1.73
GLOBULIN SER CALC-MCNC: 2.9 G/DL (ref 1.5–4.5)
GLUCOSE SERPL-MCNC: 146 MG/DL (ref 65–99)
HBA1C MFR BLD: 6.3 % (ref 4.8–5.6)
HCT VFR BLD AUTO: 38.3 % (ref 37.5–51)
HDLC SERPL-MCNC: 47 MG/DL
HGB BLD-MCNC: 12.9 G/DL (ref 13–17.7)
INTERPRETATION: NORMAL
LDLC SERPL CALC-MCNC: 144 MG/DL (ref 0–99)
Lab: NORMAL
MAGNESIUM SERPL-MCNC: 1.7 MG/DL (ref 1.6–2.3)
MCH RBC QN AUTO: 30.6 PG (ref 26.6–33)
MCHC RBC AUTO-ENTMCNC: 33.7 G/DL (ref 31.5–35.7)
MCV RBC AUTO: 91 FL (ref 79–97)
PLATELET # BLD AUTO: 308 X10E3/UL (ref 150–379)
POTASSIUM SERPL-SCNC: 4.8 MMOL/L (ref 3.5–5.2)
PROT SERPL-MCNC: 6.9 G/DL (ref 6–8.5)
RBC # BLD AUTO: 4.21 X10E6/UL (ref 4.14–5.8)
SODIUM SERPL-SCNC: 135 MMOL/L (ref 134–144)
TRIGL SERPL-MCNC: 129 MG/DL (ref 0–149)
TSH SERPL DL<=0.005 MIU/L-ACNC: 2.9 UIU/ML (ref 0.45–4.5)
VLDLC SERPL CALC-MCNC: 26 MG/DL (ref 5–40)
WBC # BLD AUTO: 6.8 X10E3/UL (ref 3.4–10.8)

## 2018-04-23 ENCOUNTER — DOCUMENTATION ONLY (OUTPATIENT)
Dept: FAMILY MEDICINE CLINIC | Age: 83
End: 2018-04-23

## 2018-04-23 NOTE — PROGRESS NOTES
Spoke with Facundo Levia at [address] [place] [address] and will fax form for request for INR machine.

## 2018-04-24 ENCOUNTER — DOCUMENTATION ONLY (OUTPATIENT)
Dept: FAMILY MEDICINE CLINIC | Age: 83
End: 2018-04-24

## 2018-05-02 ENCOUNTER — TELEPHONE (OUTPATIENT)
Dept: FAMILY MEDICINE CLINIC | Age: 83
End: 2018-05-02

## 2018-05-02 NOTE — TELEPHONE ENCOUNTER
Pls call Corinne palencia/Dr. Beach Nab office   They want the patient to stop coumadin 5 days prior to [facility] on 5/16/18    She can be reached at [phone]

## 2018-05-02 NOTE — TELEPHONE ENCOUNTER
Spoke with Chente Zeng and advised per Dr Reveles Like patient can stop Coumadin 5 days prior to procedure and restart 2 days after. She will notify patient.

## 2018-05-10 ENCOUNTER — HOSPITAL ENCOUNTER (OUTPATIENT)
Dept: NON INVASIVE DIAGNOSTICS | Age: 83
Discharge: HOME | End: 2018-05-10
Attending: FAMILY MEDICINE
Payer: MEDICARE

## 2018-05-10 DIAGNOSIS — R06.09 DOE (DYSPNEA ON EXERTION): ICD-10-CM

## 2018-05-10 PROCEDURE — C8929 TTE W OR WO FOL WCON,DOPPLER: HCPCS

## 2018-05-10 PROCEDURE — 74011250636 HC RX REV CODE- 250/636

## 2018-05-10 RX ORDER — SODIUM CHLORIDE 0.9 % (FLUSH) 0.9 %
SYRINGE (ML) INJECTION
Status: DISCONTINUED
Start: 2018-05-10 | End: 2018-05-14 | Stop reason: HOSPADM

## 2018-05-10 RX ADMIN — PERFLUTREN 2 ML: 6.52 INJECTION, SUSPENSION INTRAVENOUS at 12:16

## 2018-05-11 DIAGNOSIS — R93.1 DECREASED CARDIAC EJECTION FRACTION: ICD-10-CM

## 2018-05-11 DIAGNOSIS — R06.09 DOE (DYSPNEA ON EXERTION): Primary | ICD-10-CM

## 2018-05-11 NOTE — PROGRESS NOTES
Pls call - Echo showed a little bit decreased pumping of the heart and 1 of the walls in the heart was not moving very well. Will send him to cardiology for further testing (likely nuclear stress test).   thanks

## 2018-05-14 ENCOUNTER — DOCUMENTATION ONLY (OUTPATIENT)
Dept: FAMILY MEDICINE CLINIC | Age: 83
End: 2018-05-14

## 2018-05-14 NOTE — PROGRESS NOTES
Patient advised per Dr Mari Whitmore PT/INR 1. 8. Patient advised to continue same dose of Coumadin and recheck PT /INR in 1 week. He states has appointment scheduled for 5/23/18 with Dr Mari Whitmore and will keep that date and time. Patient was also advised per Dr Mari Whitmore Echo showed a little bit decreased pumping of the heart and 1 of the walls in the heart was not moving very well. Grace Santiago has referred to  cardiology for further testing (likely nuclear stress test).

## 2018-05-14 NOTE — PROGRESS NOTES
INR 1.8 with home check. Continue same dose of 8 mg daily and recheck in 1 week. Last INR was 2.9 on 4/18.

## 2018-05-16 ENCOUNTER — ANESTHESIA (OUTPATIENT)
Dept: ENDOSCOPY | Age: 83
End: 2018-05-16
Payer: MEDICARE

## 2018-05-16 ENCOUNTER — HOSPITAL ENCOUNTER (OUTPATIENT)
Age: 83
Setting detail: OUTPATIENT SURGERY
Discharge: HOME | End: 2018-05-16
Attending: INTERNAL MEDICINE | Admitting: INTERNAL MEDICINE
Payer: MEDICARE

## 2018-05-16 ENCOUNTER — ANESTHESIA EVENT (OUTPATIENT)
Dept: ENDOSCOPY | Age: 83
End: 2018-05-16
Payer: MEDICARE

## 2018-05-16 VITALS
RESPIRATION RATE: 21 BRPM | HEART RATE: 67 BPM | OXYGEN SATURATION: 100 % | DIASTOLIC BLOOD PRESSURE: 76 MMHG | WEIGHT: 277.5 LBS | HEIGHT: 71 IN | BODY MASS INDEX: 38.85 KG/M2 | TEMPERATURE: 97.6 F | SYSTOLIC BLOOD PRESSURE: 134 MMHG

## 2018-05-16 LAB
GLUCOSE BLD STRIP.AUTO-MCNC: 87 MG/DL (ref 65–100)
GLUCOSE BLD STRIP.AUTO-MCNC: 99 MG/DL (ref 65–100)
SERVICE CMNT-IMP: NORMAL
SERVICE CMNT-IMP: NORMAL

## 2018-05-16 PROCEDURE — 74011000250 HC RX REV CODE- 250

## 2018-05-16 PROCEDURE — 76060000031 HC ANESTHESIA FIRST 0.5 HR: Performed by: INTERNAL MEDICINE

## 2018-05-16 PROCEDURE — 74011250636 HC RX REV CODE- 250/636

## 2018-05-16 PROCEDURE — 77030018712 HC DEV BLLN INFL BSC -B: Performed by: INTERNAL MEDICINE

## 2018-05-16 PROCEDURE — 74011250636 HC RX REV CODE- 250/636: Performed by: INTERNAL MEDICINE

## 2018-05-16 PROCEDURE — 77030019988 HC FCPS ENDOSC DISP BSC -B: Performed by: INTERNAL MEDICINE

## 2018-05-16 PROCEDURE — 76040000019: Performed by: INTERNAL MEDICINE

## 2018-05-16 PROCEDURE — 82962 GLUCOSE BLOOD TEST: CPT

## 2018-05-16 PROCEDURE — 88305 TISSUE EXAM BY PATHOLOGIST: CPT | Performed by: INTERNAL MEDICINE

## 2018-05-16 RX ORDER — FLUMAZENIL 0.1 MG/ML
0.2 INJECTION INTRAVENOUS
Status: DISCONTINUED | OUTPATIENT
Start: 2018-05-16 | End: 2018-05-16 | Stop reason: HOSPADM

## 2018-05-16 RX ORDER — SODIUM CHLORIDE 0.9 % (FLUSH) 0.9 %
5-10 SYRINGE (ML) INJECTION EVERY 8 HOURS
Status: DISCONTINUED | OUTPATIENT
Start: 2018-05-16 | End: 2018-05-16 | Stop reason: HOSPADM

## 2018-05-16 RX ORDER — PROPOFOL 10 MG/ML
INJECTION, EMULSION INTRAVENOUS AS NEEDED
Status: DISCONTINUED | OUTPATIENT
Start: 2018-05-16 | End: 2018-05-16 | Stop reason: HOSPADM

## 2018-05-16 RX ORDER — SODIUM CHLORIDE 0.9 % (FLUSH) 0.9 %
5-10 SYRINGE (ML) INJECTION AS NEEDED
Status: ACTIVE | OUTPATIENT
Start: 2018-05-16 | End: 2018-05-16

## 2018-05-16 RX ORDER — NALOXONE HYDROCHLORIDE 0.4 MG/ML
0.4 INJECTION, SOLUTION INTRAMUSCULAR; INTRAVENOUS; SUBCUTANEOUS
Status: DISCONTINUED | OUTPATIENT
Start: 2018-05-16 | End: 2018-05-16 | Stop reason: HOSPADM

## 2018-05-16 RX ORDER — DEXTROMETHORPHAN/PSEUDOEPHED 2.5-7.5/.8
1.2 DROPS ORAL
Status: DISCONTINUED | OUTPATIENT
Start: 2018-05-16 | End: 2018-05-16 | Stop reason: HOSPADM

## 2018-05-16 RX ORDER — SODIUM CHLORIDE 9 MG/ML
25 INJECTION, SOLUTION INTRAVENOUS CONTINUOUS
Status: DISPENSED | OUTPATIENT
Start: 2018-05-16 | End: 2018-05-16

## 2018-05-16 RX ORDER — SODIUM CHLORIDE 0.9 % (FLUSH) 0.9 %
5-10 SYRINGE (ML) INJECTION AS NEEDED
Status: DISCONTINUED | OUTPATIENT
Start: 2018-05-16 | End: 2018-05-16 | Stop reason: HOSPADM

## 2018-05-16 RX ORDER — ATROPINE SULFATE 0.1 MG/ML
0.5 INJECTION INTRAVENOUS
Status: ACTIVE | OUTPATIENT
Start: 2018-05-16 | End: 2018-05-16

## 2018-05-16 RX ORDER — NALOXONE HYDROCHLORIDE 0.4 MG/ML
0.4 INJECTION, SOLUTION INTRAMUSCULAR; INTRAVENOUS; SUBCUTANEOUS
Status: ACTIVE | OUTPATIENT
Start: 2018-05-16 | End: 2018-05-16

## 2018-05-16 RX ORDER — LIDOCAINE HYDROCHLORIDE 20 MG/ML
INJECTION, SOLUTION EPIDURAL; INFILTRATION; INTRACAUDAL; PERINEURAL AS NEEDED
Status: DISCONTINUED | OUTPATIENT
Start: 2018-05-16 | End: 2018-05-16 | Stop reason: HOSPADM

## 2018-05-16 RX ORDER — EPINEPHRINE 0.1 MG/ML
1 INJECTION INTRACARDIAC; INTRAVENOUS
Status: DISCONTINUED | OUTPATIENT
Start: 2018-05-16 | End: 2018-05-16 | Stop reason: HOSPADM

## 2018-05-16 RX ORDER — FLUMAZENIL 0.1 MG/ML
0.2 INJECTION INTRAVENOUS
Status: ACTIVE | OUTPATIENT
Start: 2018-05-16 | End: 2018-05-16

## 2018-05-16 RX ORDER — EPINEPHRINE 0.1 MG/ML
1 INJECTION INTRACARDIAC; INTRAVENOUS
Status: ACTIVE | OUTPATIENT
Start: 2018-05-16 | End: 2018-05-16

## 2018-05-16 RX ORDER — ATROPINE SULFATE 0.1 MG/ML
0.5 INJECTION INTRAVENOUS
Status: DISCONTINUED | OUTPATIENT
Start: 2018-05-16 | End: 2018-05-16 | Stop reason: HOSPADM

## 2018-05-16 RX ADMIN — PROPOFOL 200 MG: 10 INJECTION, EMULSION INTRAVENOUS at 13:51

## 2018-05-16 RX ADMIN — LIDOCAINE HYDROCHLORIDE 80 MG: 20 INJECTION, SOLUTION EPIDURAL; INFILTRATION; INTRACAUDAL; PERINEURAL at 13:41

## 2018-05-16 RX ADMIN — SODIUM CHLORIDE 25 ML/HR: 900 INJECTION, SOLUTION INTRAVENOUS at 13:26

## 2018-05-16 NOTE — PROGRESS NOTES
Anesthesia reports 200 mg Propofol, 80 mg Lidocaine and 450 ml of Normal Saline were given during procedure. Received report from anesthesia staff on vital signs and status of patient.

## 2018-05-16 NOTE — ANESTHESIA PREPROCEDURE EVALUATION
Anesthetic History   No history of anesthetic complications            Review of Systems / Medical History  Patient summary reviewed, nursing notes reviewed and pertinent labs reviewed    Pulmonary          Smoker      Comments: Former smoker   Neuro/Psych   Within defined limits           Cardiovascular    Hypertension: well controlled              Exercise tolerance: >4 METS  Comments: Recent ECHO showed a 40-45% EF with trivial MR and TR   GI/Hepatic/Renal     GERD: poorly controlled      PUD     Endo/Other    Diabetes: well controlled, type 2, using insulin    Morbid obesity and arthritis     Other Findings   Comments: Thromboembolus, on coumadin, but stopped 5 days ago.            Physical Exam    Airway  Mallampati: III  TM Distance: 4 - 6 cm  Neck ROM: normal range of motion   Mouth opening: Normal     Cardiovascular    Rhythm: regular  Rate: normal         Dental    Dentition: Full lower dentures and Full upper dentures     Pulmonary  Breath sounds clear to auscultation               Abdominal  GI exam deferred       Other Findings            Anesthetic Plan    ASA: 3  Anesthesia type: general and total IV anesthesia          Induction: Intravenous  Anesthetic plan and risks discussed with: Patient

## 2018-05-16 NOTE — ROUTINE PROCESS
Juan A Hollis  1934  [id number]    Situation:  Verbal report received from: Joann Del Valle RN  Procedure: Procedure(s):  ESOPHAGOGASTRODUODENOSCOPY (EGD) WITH GUIDEWIRE DILATION   ESOPHAGEAL DILATION  ESOPHAGOGASTRODUODENAL (EGD) BIOPSY    Background:    Preoperative diagnosis: CHRONIC COUGH, GERD, ESOPHAGEAL DYSPHAGIA   Postoperative diagnosis: hiatal henia; shatzki's ring;dysphasia    :  Dr. Vale Sharpe  Assistant(s): Endoscopy Technician-1: Guillermina Scott  Endoscopy RN-1: Malachi Salgado RN    Specimens:   ID Type Source Tests Collected by Time Destination   1 : distal esoph bx Preservative Esophagus, Distal  Jalen Santillan MD 5/16/2018 1346 Pathology   2 : proximal esoph bx Preservative Esophagus, Proximal  Jalen Santillan MD 5/16/2018 1347 Pathology     H. Pylori  no    Assessment:  Intra-procedure medications       Anesthesia gave intra-procedure sedation and medications, see anesthesia flow sheet yes    Intravenous fluids: NS@ KVO     Vital signs stable     Abdominal assessment: round and soft     Recommendation:  Discharge patient per MD order.     Family or Friend   Permission to share finding with family or friend no

## 2018-05-16 NOTE — PROCEDURES
NAME:  Zay Hinds   :   1934   MRN:   [id number]     Date/Time:  2018 1:37 PM    Esophagogastroduodenoscopy (EGD) Procedure Note    Procedure: Esophagogastroduodenoscopy with biopsy, esophageal dilation    Indication: dysphagia  Pre-operative Diagnosis: see indication above  Post-operative Diagnosis: see findings below  :  Nba Sims MD  Referring Provider:   -Gerald Christy MD    Exam:  Airway: clear, no airway problems anticipated  Heart: RRR, without gallops or rubs  Lungs: clear bilaterally without wheezes, crackles, or rhonchi  Abdomen: soft, nontender, nondistended, bowel sounds present  Mental Status: awake, alert and oriented to person, place and time     Anethesia/Sedation:  MAC anesthesia Propofol  Procedure Details   After informed consent was obtained for the procedure, with all risks and benefits of procedure explained the patient was taken to the endoscopy suite and placed in the left lateral decubitus position. Following sequential administration of sedation as per above, the [id number] gastroscope was inserted into the mouth and advanced under direct vision to third portion of the duodenum. A careful inspection was made as the gastroscope was withdrawn, including a retroflexed view of the proximal stomach; findings and interventions are described below. Findings:   OROPHARYNX: Cords and pyriform recesses normal.   ESOPHAGUS:   -- grossly normal proximal and mid esophagus, biopsied for possible eosinophilia  -- distal esophageal ring, dilated and biopsied to break open  STOMACH: The fundus on antegrade and retroflex views shows a hiatal hernia. The body, antrum, and pylorus are normal.   DUODENUM: The bulb and second portions are normal.     Therapies: esophageal dilation with savary sizes 54 fr, 57 fr  biopsy of esophagus    Specimens: distal esophagus, proximal esophagus    EBL:  None. Complications:   None; patient tolerated the procedure well. Impression:  -- Schatzki's ring in distal esophagus  -- hiatal hernia, small  -- dysphagia symptoms  -- status post esophageal dilation as above, to max of 57 fr    Recommendations:  -- await pathology  -- await effect of dilation  -- follow up in office    Discharge disposition:  Home in the company of  when able to ambulate    Flavio Roberson MD

## 2018-05-16 NOTE — DISCHARGE INSTRUCTIONS
Sosa Cordon  [id number]  1934    EGD DISCHARGE INSTRUCTIONS  Discomfort:  Sore throat- throat lozenges or warm salt water gargle  redness at IV site- apply warm compress to area; if redness or soreness persist- contact your physician  Gaseous discomfort- walking, belching will help relieve any discomfort  You may not operate a vehicle for 12 hours  You may not engage in an occupation involving machinery or appliances for rest of today  You may not drink alcoholic beverages for at least 12 hours  Avoid making any critical decisions for at least 24 hour  DIET  You may have minimal sips at this time-- do not eat or drink for two hours. You may eat and drink after you wake up  You may resume your regular diet - however -  remember your colon is empty and a heavy meal will produce gas. Avoid these foods:  vegetables, fried / greasy foods, carbonated drinks    MEDICATIONS:  See attached     -- YOU CAN RESTART WARFARIN in 5 days! ACTIVITY  You may resume your normal daily activities until tomorrow AM;  Spend the remainder of the day resting -  avoid any strenuous activity. CALL M.D. ANY SIGN OF   Increasing pain, nausea, vomiting  Abdominal distension (swelling)  New increased bleeding (oral or rectal)  Fever (chills)  Pain in chest area  Bloody discharge from nose or mouth  Shortness of breath      IMPRESSION:  -- small ring or scar in the lower esophagus, which we biopsied and stretched open  -- we also noticed a very small hiatal hernia, which may contribute to reflux and the above scarring changes  -- lastly, we did biopsy the upper esophagus too, looking for any inflammation or other lining cause of difficulty swallowing    Follow-up Instructions:   Call Dr. Morena Bhatti for the results of procedure / biopsy in 7-10 days   Telephone # [phone]  Appointment in 3-4 weeks    Sherwin Mckay MD       [employer] Activation    Thank you for requesting access to [employer].  Please follow the instructions below to securely access and download your online medical record. [facility] allows you to send messages to your doctor, view your test results, renew your prescriptions, schedule appointments, and more. How Do I Sign Up? 1. In your internet browser, go to [email or web address]  2. Click on the First Time User? Click Here link in the Sign In box. You will be redirect to the New Member Sign Up page. 3. Enter your Synacor Access Code exactly as it appears below. You will not need to use this code after youve completed the sign-up process. If you do not sign up before the expiration date, you must request a new code. Synacor Access Code: [email or web address]  Expires: 2018 12:59 PM (This is the date your Synacor access code will )    4. Enter the last four digits of your Social Security Number (xxxx) and Date of Birth (mm/dd/yyyy) as indicated and click Submit. You will be taken to the next sign-up page. 5. Create a Synacor ID. This will be your Synacor login ID and cannot be changed, so think of one that is secure and easy to remember. 6. Create a Synacor password. You can change your password at any time. 7. Enter your Password Reset Question and Answer. This can be used at a later time if you forget your password. 8. Enter your e-mail address. You will receive e-mail notification when new information is available in [address] 9. Click Sign Up. You can now view and download portions of your medical record. 10. Click the Download Summary menu link to download a portable copy of your medical information. Additional Information    If you have questions, please visit the Frequently Asked Questions section of the Synacor website at [email or web address] Remember, [facility] is NOT to be used for urgent needs. For medical emergencies, dial 911.

## 2018-05-16 NOTE — H&P
Date of Surgery Update:  Marry Artis was seen and examined. History and physical has been reviewed. The patient has been examined.  There have been no significant clinical changes since the completion of the originally dated History and Physical.    Signed By: Tom Stanford MD     May 16, 2018 1:37 PM

## 2018-05-16 NOTE — IP AVS SNAPSHOT
[address] [facility] 
[phone] Patient: Maria Elena Low MRN: [id number] DBV:[id number] About your hospitalization You were admitted on:  May 16, 2018 You last received care in the:  [facility] You were discharged on:  May 16, 2018 Why you were hospitalized Your primary diagnosis was:  Not on File Follow-up Information Follow up With Details Comments Contact Info Barbara Spicer MD   [address] [place] [id number] 
[phone] Your Scheduled Appointments Wednesday May 23, 2018 10:10 AM EDT ROUTINE CARE with Barbara Spicer MD  
[facility] [address] [facility] [address] [facility]  
[phone] Discharge Orders None A check davina indicates which time of day the medication should be taken. My Medications CHANGE how you take these medications Instructions Each Dose to Equal  
 Morning Noon Evening Bedtime  
 magnesium oxide 400 mg tablet Commonly known as:  MAG-OX What changed:   
- how much to take - when to take this 
- additional instructions Your last dose was: Your next dose is: Take 1 Tab by mouth four (4) times daily. For low magnesium 400 mg CONTINUE taking these medications Instructions Each Dose to Equal  
 Morning Noon Evening Bedtime COUMADIN 4 mg tablet Generic drug:  warfarin Your last dose was: Your next dose is: TAKE 2 TABLETS DAILY  
     
   
   
   
  
 diclofenac 1 % Gel Commonly known as:  VOLTAREN Your last dose was: Your next dose is:    
   
   
 Apply  to affected area four (4) times daily. exenatide microspheres 2 mg/0.65 mL Pnij Commonly known as:  BYDUREON Your last dose was: Your next dose is: 2 mg by SubCUTAneous route every seven (7) days. For diabetes 2 mg FLEXERIL 10 mg tablet Generic drug:  cyclobenzaprine Your last dose was: Your next dose is: Take  by mouth three (3) times daily as needed for Muscle Spasm(s). insulin glargine 100 unit/mL (3 mL) Inpn Commonly known as:  LANTUS SOLOSTAR U-100 INSULIN Your last dose was: Your next dose is:    
   
   
 55 Units by SubCUTAneous route daily. 55 Units Insulin Needles (Disposable) 31 gauge x 3/16\" Ndle Commonly known as:  BD ULTRA-FINE MINI PEN NEEDLE Your last dose was: Your next dose is:    
   
   
 1 each by SubCUTAneous route daily. 1 Each Linda Pen Needle 32 gauge x 5/32\" Ndle Generic drug:  Insulin Needles (Disposable) Your last dose was: Your next dose is:    
   
   
 USE 1 NEEDLE UNDER THE SKIN DAILY  
     
   
   
   
  
 lansoprazole 30 mg capsule Commonly known as:  PREVACID Your last dose was: Your next dose is: Take 1 Cap by mouth Daily (before breakfast). 30 mg  
    
   
   
   
  
 metFORMIN 1,000 mg tablet Commonly known as:  GLUCOPHAGE Your last dose was: Your next dose is: Take 1 Tab by mouth two (2) times a day. 1000 mg Telmisartan-amLODIPine 80-5 mg Tab Your last dose was: Your next dose is: TAKE 1 TABLET NIGHTLY FOR BLOOD PRESSURE  
     
   
   
   
  
 VITAMIN B-12 1,000 mcg tablet Generic drug:  cyanocobalamin Your last dose was: Your next dose is: Take 1,000 mcg by mouth daily. Nature Made  
 1000 mcg VITAMIN D3 5,000 unit Tab tablet Generic drug:  cholecalciferol (VITAMIN D3) Your last dose was: Your next dose is: Take  by mouth daily. Nature Made Discharge Instructions Carolina Aguilar [id number] 1934 EGD DISCHARGE INSTRUCTIONS Discomfort: 
Sore throat- throat lozenges or warm salt water gargle 
redness at IV site- apply warm compress to area; if redness or soreness persist- contact your physician Gaseous discomfort- walking, belching will help relieve any discomfort You may not operate a vehicle for 12 hours You may not engage in an occupation involving machinery or appliances for rest of today You may not drink alcoholic beverages for at least 12 hours Avoid making any critical decisions for at least 24 hour DIET You may have minimal sips at this time-- do not eat or drink for two hours. You may eat and drink after you wake up You may resume your regular diet  however -  remember your colon is empty and a heavy meal will produce gas. Avoid these foods:  vegetables, fried / greasy foods, carbonated drinks MEDICATIONS: 
See attached  
 
-- YOU CAN RESTART WARFARIN in 5 days! ACTIVITY You may resume your normal daily activities until tomorrow AM; 
Spend the remainder of the day resting -  avoid any strenuous activity. CALL M.D. ANY SIGN OF Increasing pain, nausea, vomiting Abdominal distension (swelling) New increased bleeding (oral or rectal) Fever (chills) Pain in chest area Bloody discharge from nose or mouth Shortness of breath IMPRESSION: 
-- small ring or scar in the lower esophagus, which we biopsied and stretched open -- we also noticed a very small hiatal hernia, which may contribute to reflux and the above scarring changes -- lastly, we did biopsy the upper esophagus too, looking for any inflammation or other lining cause of difficulty swallowing Follow-up Instructions: 
 Call Dr. Pop Cannon for the results of procedure / biopsy in 7-10 days Telephone # [phone] Appointment in 3-4 weeks Chery Poon MD  
 
 
MyChart Activation Thank you for requesting access to [employer]. Please follow the instructions below to securely access and download your online medical record. [employer] allows you to send messages to your doctor, view your test results, renew your prescriptions, schedule appointments, and more. How Do I Sign Up? 1. In your internet browser, go to [email or web address] 
2. Click on the First Time User? Click Here link in the Sign In box. You will be redirect to the New Member Sign Up page. 3. Enter your [employer] Access Code exactly as it appears below. You will not need to use this code after youve completed the sign-up process. If you do not sign up before the expiration date, you must request a new code. [employer] Access Code: [email or web address] Expires: 2018 12:59 PM (This is the date your [employer] access code will ) 4. Enter the last four digits of your Social Security Number (xxxx) and Date of Birth (mm/dd/yyyy) as indicated and click Submit. You will be taken to the next sign-up page. 5. Create a [employer] ID. This will be your [employer] login ID and cannot be changed, so think of one that is secure and easy to remember. 6. Create a [employer] password. You can change your password at any time. 7. Enter your Password Reset Question and Answer. This can be used at a later time if you forget your password. 8. Enter your e-mail address. You will receive e-mail notification when new information is available in [address] 9. Click Sign Up. You can now view and download portions of your medical record. 10. Click the Download Summary menu link to download a portable copy of your medical information. Additional Information If you have questions, please visit the Frequently Asked Questions section of the [employer] website at [email or web address] Remember, [employer] is NOT to be used for urgent needs. For medical emergencies, dial 911. Introducing hospitals & [facility] SERVICES! Priyanka Palma introduces [employer] patient portal. Now you can access parts of your medical record, email your doctor's office, and request medication refills online. 1. In your internet browser, go to [email or web address] 2. Click on the First Time User? Click Here link in the Sign In box. You will see the New Member Sign Up page. 3. Enter your Applause Access Code exactly as it appears below. You will not need to use this code after youve completed the sign-up process. If you do not sign up before the expiration date, you must request a new code. · Applause Access Code: [email or web address] Expires: 7/17/2018 12:59 PM 
 
4. Enter the last four digits of your Social Security Number (xxxx) and Date of Birth (mm/dd/yyyy) as indicated and click Submit. You will be taken to the next sign-up page. 5. Create a Applause ID. This will be your Applause login ID and cannot be changed, so think of one that is secure and easy to remember. 6. Create a Applause password. You can change your password at any time. 7. Enter your Password Reset Question and Answer. This can be used at a later time if you forget your password. 8. Enter your e-mail address. You will receive e-mail notification when new information is available in [address] 9. Click Sign Up. You can now view and download portions of your medical record. 10. Click the Download Summary menu link to download a portable copy of your medical information. If you have questions, please visit the Frequently Asked Questions section of the Applause website. Remember, Applause is NOT to be used for urgent needs. For medical emergencies, dial 911. Now available from your iPhone and Android! Introducing Volodymyr Quijano As a Priyanka Palma patient, I wanted to make you aware of our electronic visit tool called Volodymyr Quijano. Priyanka Palma 24/7 allows you to connect within minutes with a medical provider 24 hours a day, seven days a week via a mobile device or tablet or logging into a secure website from your computer. You can access [employer] from anywhere in the [place]. A virtual visit might be right for you when you have a simple condition and feel like you just dont want to get out of bed, or cant get away from work for an appointment, when your regular [employer] provider is not available (evenings, weekends or holidays), or when youre out of town and need minor care. Electronic visits cost only $49 and if the [employer] 24/7 provider determines a prescription is needed to treat your condition, one can be electronically transmitted to a nearby pharmacy*. Please take a moment to enroll today if you have not already done so. The enrollment process is free and takes just a few minutes. To enroll, please download the [employer] 24/7 branden to your tablet or phone, or visit [email or web address] to enroll on your computer. And, as an [address] [place] [address] patient with a [employer] account, the results of your visits will be scanned into your electronic medical record and your primary care provider will be able to view the scanned results. We urge you to continue to see your regular [employer] provider for your ongoing medical care. And while your primary care provider may not be the one available when you seek a Volodymyr Velezestebanfin virtual visit, the peace of mind you get from getting a real diagnosis real time can be priceless. For more information on [employer]an[employer], view our Frequently Asked Questions (FAQs) at [email or web address]. Sincerely, 
 
George Melton MD 
Chief Medical Officer [employer] *:  certain medications cannot be prescribed via AllDigitalmill[employer]ent Providers Seen During Your Hospitalization Provider Specialty Primary office phone Mary Gilliland MD Gastroenterology [phone] Your Primary Care Physician (PCP) Primary Care Physician Office Phone Office Fax Joana Osler [phone] You are allergic to the following Allergen Reactions Statins-Hmg-Coa Reductase Inhibitors Myalgia Tried several statins. Recent Documentation Height Weight BMI Smoking Status 1.803 m 125.9 kg 38.7 kg/m2 Former Smoker Emergency Contacts Name Discharge Info Relation Home Work Mobile Caity Paiz DISCHARGE CAREGIVER [3] Spouse [3] [phone] Patient Belongings The following personal items are in your possession at time of discharge: 
  Dental Appliances: Uppers, Lowers  Visual Aid: None Please provide this summary of care documentation to your next provider. Signatures-by signing, you are acknowledging that this After Visit Summary has been reviewed with you and you have received a copy. Patient Signature:  ____________________________________________________________ Date:  ____________________________________________________________  
  
Sarah Enamorado Provider Signature:  ____________________________________________________________ Date:  ____________________________________________________________

## 2018-05-18 ENCOUNTER — TELEPHONE (OUTPATIENT)
Dept: FAMILY MEDICINE CLINIC | Age: 83
End: 2018-05-18

## 2018-05-18 NOTE — TELEPHONE ENCOUNTER
----- Message from [facility] sent at 5/18/2018  9:46 AM EDT -----  Regarding: Dr. Jimena Robles  Pt's nurse wanted to report pt's INR as  1.1.

## 2018-05-18 NOTE — TELEPHONE ENCOUNTER
Called patient to notify him that Dr. Tara Cervantes wanted to see him in the clinic to recheck his INR. Patient stated that the reason that his result reading is off at this time is because he has been off of his COUMADIN for 6 days and will need to be off until Monday (2018). He stated that he has been off due to having Biopsy and Endoscopy Procedures - as indicated below:    Admission  Discharged      2018  [facility]  Procedures   Jerrell Ray MD (Physician) [facility]   Pre-procedure Diagnoses:   1. Pharyngoesophageal dysphagia [R13.14]   Post-procedure Diagnoses:   1. Schatzki's ring of distal esophagus [K22.2]   2. Hiatal hernia [K44.9]   3. Esophageal dysphagia [R13.10]   Procedures:   1. UPPER GI ENDOSCOPY,DILATN W GUIDE [[id number]]   2. UPPER GI ENDOSCOPY,BIOPSY [[id number]]           NAME:                      Peng Sharma   :                                   1934   MRN:                                   [id number]      Date/Time:                2018 1:37 PM     Esophagogastroduodenoscopy (EGD) Procedure Note     Procedure: Esophagogastroduodenoscopy with biopsy, esophageal dilation     Indication:                dysphagia  Pre-operative Diagnosis: see indication above  Post-operative Diagnosis: see findings below  :  Yevgeniy Ortega MD  Referring Provider:   -Elvia Arreola MD     Exam:  Airway: clear, no airway problems anticipated  Heart: RRR, without gallops or rubs  Lungs: clear bilaterally without wheezes, crackles, or rhonchi  Abdomen: soft, nontender, nondistended, bowel sounds present  Mental Status: awake, alert and oriented to person, place and time      Anethesia/Sedation:  MAC anesthesia Propofol  Procedure Details   After informed consent was obtained for the procedure, with all risks and benefits of procedure explained the patient was taken to the endoscopy suite and placed in the left lateral decubitus position.   Following sequential administration of sedation as per above, the [id number] gastroscope was inserted into the mouth and advanced under direct vision to third portion of the duodenum. A careful inspection was made as the gastroscope was withdrawn, including a retroflexed view of the proximal stomach; findings and interventions are described below.                         Findings:   OROPHARYNX: Cords and pyriform recesses normal.   ESOPHAGUS:   -- grossly normal proximal and mid esophagus, biopsied for possible eosinophilia  -- distal esophageal ring, dilated and biopsied to break open  STOMACH: The fundus on antegrade and retroflex views shows a hiatal hernia. The body, antrum, and pylorus are normal.   DUODENUM: The bulb and second portions are normal.      Therapies: esophageal dilation with savary sizes 54 fr, 57 fr  biopsy of esophagus     Specimens: distal esophagus, proximal esophagus     EBL:  None. Complications:   None; patient tolerated the procedure well.      Impression:  -- Schatzki's ring in distal esophagus  -- hiatal hernia, small  -- dysphagia symptoms  -- status post esophageal dilation as above, to max of 57 fr     Recommendations:  -- await pathology  -- await effect of dilation  -- follow up in office     Discharge disposition:  Home in the company of  when able to ambulate     Sid Reis MD      H&P   Sid Reis MD (Physician) [facility]      Date of Surgery Update:  Jean Almeida was seen and examined. History and physical has been reviewed. The patient has been examined.  There have been no significant clinical changes since the completion of the originally dated History and Physical.     Signed By: Sid Reis MD      May 16, 2018 1:37 PM                     Other Notes        H&P from [employer] [facility]        Progress Notes from Baron Wendi RN (GI LAB)        Progress Notes from Baron Wendi RN (GI LAB)        Progress Notes from Baron Wendi RN (GI LAB)        Routine Process from Pati Valencia RN   Instructions     AVS Standard Font (Printed 5/16/2018)   Additional Documentation   Vitals:     /76     Pulse 67     Temp 97.6 °F (36.4 °C)     Resp 21     Ht 5' 11\" (1.803 m)     Wt 277 lb 8 oz (125.9 kg)     SpO2 100%     BMI 38.7 kg/m2     BSA 2.51 m2           More Vitals    Flowsheets:     Pre-Op Call Times,     Pre-Proc Phone Call,     Travel Screening,     Fall Risk,     Vital Signs Simple,     OP Admission Database,     Peds Assessment ,     Pre-Procedure Assessment,     Pre-Procedure Checklist,     Endoscopy Pre-Op,     Post-op Follow-up Call,     I/O-Lines-Drains-Blood,     I/O-LDA's-MAR,     Reversal Agent,     Status,     Transfer of Care Checklist,     Endoscopy Intra-Proc,     DISCHARGE CHECKLIST,     Vital Signs Complex,     Endoscopy Post-op,     *NOTE: Once you davina the call as complete, this case will not appear on the report of patients to be called*     . ..(18 more)   Encounter Info:     Billing Info,     History,     Allergies,     Detailed Report        Media   Scan on 5/17/2018 1301 by Michael Quintero : History and Physical RecordScan on 5/17/2018 1301 by Michael Quintero : History and Physical Record   Scan on 5/17/2018 1301 by Michael Quintero : Consents/LegalScan on 5/17/2018 1301 by Michael Quintero : Consents/Legal   Electronic signature on 5/16/2018 1412   Orders Placed        GLUCOSE, POC No remaining occurrences        GLUCOSE, POC No remaining occurrences        SURGICAL PATHOLOGY No remaining occurrences        SCANNED CARDIAC RHYTHM STRIP       RT--OXIMETRY, CONTINUOUS CONTINUOUS       RT--OXIMETRY, CONTINUOUS CONTINUOUS       RT--OXYGEN CANNULA CONTINUOUS       RT--OXYGEN CANNULA CONTINUOUS      All Encounter Results   Medication List at Discharge        cholecalciferol (vitamin D3) 5,000 unit Oral DAILY, Nature Made         cyanocobalamin (vitamin B-12) 1,000 mcg Oral DAILY, Nature Made         cyclobenzaprine HCl 10 mg TID PRN diclofenac sodium 1 % Topical QID       exenatide microspheres 2 mg SubCUTAneous Q7D, For diabetes       insulin glargine,hum.rec.anlog 55 Units SubCUTAneous DAILY       lansoprazole 30 mg Oral ACB       magnesium oxide 400 mg Oral QID, For low magnesium   Patient taking differently:  Take 1,600 mg by mouth daily.  For low magnesium       metformin HCl 1,000 mg Oral BID       needles, insulin disposable 31 gauge x 3/16\" 1 Each SubCUTAneous DAILY       pen needle, diabetic 32 gauge x 5/32\" USE 1 NEEDLE UNDER THE SKIN DAILY       telmisartan/amlodipine 80-5 mg TAKE 1 TABLET NIGHTLY FOR BLOOD PRESSURE       warfarin sodium 4 mg TAKE 2 TABLETS DAILY   Medications Administered     0.9 % sodium chloride 25 mL/hr   Visit Diagnoses       None      Problem List

## 2018-05-23 ENCOUNTER — OFFICE VISIT (OUTPATIENT)
Dept: FAMILY MEDICINE CLINIC | Age: 83
End: 2018-05-23

## 2018-05-23 VITALS
RESPIRATION RATE: 18 BRPM | DIASTOLIC BLOOD PRESSURE: 65 MMHG | HEART RATE: 67 BPM | OXYGEN SATURATION: 97 % | WEIGHT: 274.2 LBS | SYSTOLIC BLOOD PRESSURE: 116 MMHG | HEIGHT: 71 IN | TEMPERATURE: 97.5 F | BODY MASS INDEX: 38.39 KG/M2

## 2018-05-23 DIAGNOSIS — Z51.81 ENCOUNTER FOR MONITORING COUMADIN THERAPY: ICD-10-CM

## 2018-05-23 DIAGNOSIS — D68.59 HYPERCOAGULABLE STATE (HCC): Primary | ICD-10-CM

## 2018-05-23 DIAGNOSIS — R93.1 ABNORMAL ECHOCARDIOGRAM: ICD-10-CM

## 2018-05-23 DIAGNOSIS — Z86.718 HISTORY OF VENOUS THROMBOEMBOLISM: ICD-10-CM

## 2018-05-23 DIAGNOSIS — Z91.81 RISK FOR FALLS: ICD-10-CM

## 2018-05-23 DIAGNOSIS — M19.012 PRIMARY OSTEOARTHRITIS OF LEFT SHOULDER: ICD-10-CM

## 2018-05-23 DIAGNOSIS — R06.09 DYSPNEA ON EXERTION: ICD-10-CM

## 2018-05-23 DIAGNOSIS — Z79.01 ENCOUNTER FOR MONITORING COUMADIN THERAPY: ICD-10-CM

## 2018-05-23 DIAGNOSIS — R05.3 CHRONIC COUGH: ICD-10-CM

## 2018-05-23 PROBLEM — E11.21 TYPE 2 DIABETES WITH NEPHROPATHY (HCC): Status: ACTIVE | Noted: 2018-05-23

## 2018-05-23 LAB
INR BLD: 1.1
PT POC: NORMAL SECONDS
VALID INTERNAL CONTROL?: YES

## 2018-05-23 RX ORDER — FAMOTIDINE 20 MG/1
TABLET, FILM COATED ORAL
COMMUNITY
Start: 2018-04-02 | End: 2018-05-23 | Stop reason: ALTCHOICE

## 2018-05-23 RX ORDER — FINASTERIDE 5 MG/1
TABLET, FILM COATED ORAL
COMMUNITY
Start: 2018-05-21 | End: 2018-06-04 | Stop reason: SDUPTHER

## 2018-05-23 NOTE — MR AVS SNAPSHOT
[address] [facility] 
[phone] Patient: Pat Harding MRN: [id number]  Visit Information Date & Time Provider Department Dept. Phone Encounter #  
 2018 10:10 AM Jacque Ware MD [facility] at [address] [id number] Follow-up Instructions Return in about 8 weeks (around 2018) for Regular Follow up. Your Appointments 2018  9:30 AM  
ROUTINE CARE with Jacque Ware MD  
[facility] at [facility] [address]) Appt Note: 3m fu  
 [phone] [id number]  
[facility]  
  
    
 2018  9:50 AM  
Follow Up with Jigna May MD  
[facility] [address] [facility]) Appt Note: 6 month f/u  Diabetes; R/S  [id number]  [facility] [address] [phone] [address] [place] [address] Upcoming Health Maintenance Date Due  
 FOOT EXAM Q1 3/15/2018 Pneumococcal 65+ Low/Medium Risk (2 of 2 - PPSV23) 2018 Influenza Age 5 to Adult 2018 HEMOGLOBIN A1C Q6M 10/19/2018 MICROALBUMIN Q1 11/15/2018 EYE EXAM RETINAL OR DILATED Q1 2018 MEDICARE YEARLY EXAM 2019 LIPID PANEL Q1 2019 GLAUCOMA SCREENING Q2Y 2019 DTaP/Tdap/Td series (2 - Td) 2027 Allergies as of 2018  Review Complete On: 2018 By: Jacque Ware MD  
  
 Severity Noted Reaction Type Reactions Statins-hmg-coa Reductase Inhibitors  2016   Side Effect Myalgia Tried several statins. Current Immunizations  Never Reviewed Name Date Influenza High Dose Vaccine PF 10/5/2017 Not reviewed this visit You Were Diagnosed With   
  
 Codes Comments Hypercoagulable state ([facility] [address]    -  Primary ICD-10-CM: W21.44 
ICD-9-CM: 289.81 History of venous thromboembolism     ICD-10-CM: Z86.718 ICD-9-CM: V12.51 Encounter for monitoring coumadin therapy     ICD-10-CM: Z51.81, Z79.01 
ICD-9-CM: V58.83, V58.61 Risk for falls     ICD-10-CM: Z91.81 
ICD-9-CM: V15.88 Abnormal echocardiogram     ICD-10-CM: R93.1 ICD-9-CM: 793.2 Primary osteoarthritis of left shoulder     ICD-10-CM: M19.012 
ICD-9-CM: 715.11 Chronic cough     ICD-10-CM: R05 ICD-9-CM: 854. 2 Dyspnea on exertion     ICD-10-CM: R06.09 
ICD-9-CM: 786.09 Vitals BP Pulse Temp Resp Height(growth percentile) Weight(growth percentile) 116/65 (BP 1 Location: Right arm, BP Patient Position: Sitting) 67 97.5 °F (36.4 °C) (Oral) 18 5' 11\" (1.803 m) 274 lb 3.2 oz (124.4 kg) SpO2 BMI Smoking Status 97% 38.24 kg/m2 Former Smoker Vitals History BMI and BSA Data Body Mass Index Body Surface Area  
 38.24 kg/m 2 2.5 m 2 Preferred Pharmacy Pharmacy Name Phone [facility], [facility] [phone] Your Updated Medication List  
  
   
This list is accurate as of 5/23/18 11:25 AM.  Always use your most recent med list.  
  
  
  
  
 COUMADIN 4 mg tablet Generic drug:  warfarin TAKE 2 TABLETS DAILY  
  
 diclofenac 1 % Gel Commonly known as:  VOLTAREN Apply  to affected area four (4) times daily. exenatide microspheres 2 mg/0.65 mL Pnij Commonly known as:  BYDUREON  
2 mg by SubCUTAneous route every seven (7) days. For diabetes  
  
 finasteride 5 mg tablet Commonly known as:  PROSCAR  
  
 FLEXERIL 10 mg tablet Generic drug:  cyclobenzaprine Take  by mouth three (3) times daily as needed for Muscle Spasm(s). insulin glargine 100 unit/mL (3 mL) Inpn Commonly known as:  LANTUS SOLOSTAR U-100 INSULIN  
55 Units by SubCUTAneous route daily. Insulin Needles (Disposable) 31 gauge x 3/16\" Ndle Commonly known as:  BD ULTRA-FINE MINI PEN NEEDLE  
1 each by SubCUTAneous route daily. Linda Pen Needle 32 gauge x 5/32\" Ndle Generic drug:  Insulin Needles (Disposable) USE 1 NEEDLE UNDER THE SKIN DAILY  
  
 lansoprazole 30 mg capsule Commonly known as:  PREVACID Take 1 Cap by mouth Daily (before breakfast). magnesium oxide 400 mg tablet Commonly known as:  MAG-OX Take 1 Tab by mouth four (4) times daily. For low magnesium  
  
 metFORMIN 1,000 mg tablet Commonly known as:  GLUCOPHAGE Take 1 Tab by mouth two (2) times a day. Telmisartan-amLODIPine 80-5 mg Tab TAKE 1 TABLET NIGHTLY FOR BLOOD PRESSURE  
  
 VITAMIN B-12 1,000 mcg tablet Generic drug:  cyanocobalamin Take 1,000 mcg by mouth daily. Nature Made VITAMIN D3 5,000 unit Tab tablet Generic drug:  cholecalciferol (VITAMIN D3) Take  by mouth daily. Nature Made May 2018 Details [facility] Tue Wed Thu Fri Sat  
    1  
  
  
  
   2  
  
  
  
   3  
  
  
  
   4  
  
  
  
   5  
  
  
  
  
  6  
  
  
  
   7  
  
  
  
   8  
  
  
  
   9  
  
  
  
   10  
  
  
  
   11  
  
  
  
   12  
  
  
  
  
  13  
  
  
  
   14  
  
  
  
   15  
  
  
  
   16  
  
  
  
   17  
  
  
  
   18  
  
  
  
   19  
  
  
  
  
  20  
  
  
  
   21  
  
  
  
   22  
  
  
  
   23  
  
12 mg See details 24  
  
12 mg  
  
   25  
  
12 mg  
  
   26  
  
8 mg  
  
  
  27  
  
8 mg  
  
   28  
  
8 mg  
  
   29  
  
8 mg  
  
   30  
  
8 mg  
  
   31  
  
8 mg Date Details 05/23 This INR check INR: 1.1 How to take your warfarin dose To take:  8 mg Take two of the 4 mg tablets. To take:  12 mg Take three of the 4 mg tablets. June 2018 Details [facility] Tue Wed Thu Fri Sat 1  
  
8 mg 2  
  
8 mg 3  
  
8 mg 4  
  
8 mg 5  
  
8 mg  
  
   6 8 mg  
  
   7  
  
8 mg 8  
  
8 mg 9  
  
8 mg  
  
  
  10  
  
8 mg  
  
   11  
  
8 mg  
  
   12  
  
8 mg  
  
   13  
  
8 mg  
  
   14  
  
8 mg  
  
   15  
  
8 mg  
  
   16  
  
8 mg  
  
  
  17  
  
8 mg  
  
   18  
  
8 mg  
  
   19  
  
8 mg  
  
   20  
  
8 mg  
  
   21  
  
8 mg  
  
   22  
  
8 mg  
  
   23  
  
8 mg  
  
  
  24  
  
8 mg  
  
   25  
  
8 mg  
  
   26  
  
8 mg  
  
   27  
  
8 mg  
  
   28  
  
8 mg  
  
   29  
  
8 mg  
  
   30  
  
8 mg Date Details No additional details How to take your warfarin dose To take:  8 mg Take two of the 4 mg tablets. July 2018 Details Paula Jara Tue Wed Thu Fri Sat 1  
  
8 mg 2  
  
8 mg 3  
  
8 mg 4  
  
8 mg  
  
   5  
  
  
  
   6  
  
  
  
   7  
  
  
  
  
  8  
  
  
  
   9  
  
  
  
   10  
  
  
  
   11  
  
  
  
   12  
  
  
  
   13  
  
  
  
   14  
  
  
  
  
  15  
  
  
  
   16  
  
  
  
   17  
  
  
  
   18  
  
  
  
   19  
  
  
  
   20  
  
  
  
   21  
  
  
  
  
  22  
  
  
  
   23  
  
  
  
   24  
  
  
  
   25  
  
  
  
   26  
  
  
  
   27  
  
  
  
   28  
  
  
  
  
  29  
  
  
  
   30  
  
  
  
   31  
  
  
  
      
 Date Details No additional details Date of next INR:  7/4/2018 How to take your warfarin dose To take:  8 mg Take two of the 4 mg tablets. We Performed the Following AMB POC PT/INR [61267 CPT(R)] AMB SUPPLY ORDER [[id number] Custom] Comments:  
 Patient needs walk in shower due to issues with fall risk and weakness in legs REFERRAL TO CARDIOLOGY [QCA24 Custom] Comments:  
 Please evaluate patient for: DIAZ, abn echo, setting up for stress test.  Numerous cardiac risk factors. REFERRAL TO PHYSICAL THERAPY [MPS47 Custom]  Comments:  
 Please evaluate patient for: global strengthening skip in lower extremities, recent falls, left shoulder pain. Would like to go with his wife to [address] [place] [address] Follow-up Instructions Return in about 8 weeks (around 7/19/2018) for Regular Follow up. To-Do List   
 05/23/2018 Imaging:  CT CHEST WO CONT   
  
 05/23/2018 Imaging:  NM CARDIAC SPECT W STRS/REST MULT Referral Information Referral ID Referred By Referred To  
  
 [id number] Vianca Georges Not Available Visits Status Start Date End Date 1 New Request 5/23/18 5/23/19 If your referral has a status of pending review or denied, additional information will be sent to support the outcome of this decision. Referral ID Referred By Referred To  
 [id number] Vianca Georges Not Available Visits Status Start Date End Date 1 New Request 5/23/18 5/23/19 If your referral has a status of pending review or denied, additional information will be sent to support the outcome of this decision. Referral ID Referred By Referred To  
 [id number] [facility] [address] [place], [address] [facility] Visits Status Start Date End Date 1 New Request 5/23/18 5/23/19 If your referral has a status of pending review or denied, additional information will be sent to support the outcome of this decision. Introducing [facility]! Eliceo Briseno introduces Share[employer] patient portal. Now you can access parts of your medical record, email your doctor's office, and request medication refills online. 1. In your internet browser, go to [email or web address] 2. Click on the First Time User? Click Here link in the Sign In box. You will see the New Member Sign Up page. 3. Enter your Share[employer] Access Code exactly as it appears below. You will not need to use this code after youve completed the sign-up process. If you do not sign up before the expiration date, you must request a new code. · [employer] Access Code: [email or web address] Expires: 7/17/2018 12:59 PM 
 
4. Enter the last four digits of your Social Security Number (xxxx) and Date of Birth (mm/dd/yyyy) as indicated and click Submit. You will be taken to the next sign-up page. 5. Create a Boox[employer] ID. This will be your Bo[employer] login ID and cannot be changed, so think of one that is secure and easy to remember. 6. Create a [employer] password. You can change your password at any time. 7. Enter your Password Reset Question and Answer. This can be used at a later time if you forget your password. 8. Enter your e-mail address. You will receive e-mail notification when new information is available in [address] 9. Click Sign Up. You can now view and download portions of your medical record. 10. Click the Download Summary menu link to download a portable copy of your medical information. If you have questions, please visit the Frequently Asked Questions section of the [employer] website. Remember, [employer] is NOT to be used for urgent needs. For medical emergencies, dial 911. Now available from your iPhone and Android! Please provide this summary of care documentation to your next provider. Your primary care clinician is listed as Edwin Atkins. If you have any questions after today's visit, please call [phone].

## 2018-05-23 NOTE — PROGRESS NOTES
Subjective:     Chief Complaint   Patient presents with    Follow-up     PT/INR      He  is a 80 y.o. male who presents for evaluation of:  Here for INR check. 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 See anti-coag tab for history and dosing with plan. Doing better since fall with no further sig pain or bruising. Decided not to get bx of LLE lesion. Ct to have trouble with weakness skip when going up stairs. Pmhx sig for DM2 (seeing Dr. 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 Diabetes has been well controlled with Lantus, Metformin, and Bydureon. ROS per HPI    Past Medical History:   Diagnosis Date    Arthritis     Back injury 1994    Diabetes ([address]     GERD (gastroesophageal reflux disease)     Hypertension     Muscle cramps     PUD (peptic ulcer disease)     Reflux     Thromboembolus ([address]     L BLE     Past Surgical History:   Procedure Laterality Date    HX ORTHOPAEDIC Left     Crushed/left index finger amputee    HX ORTHOPAEDIC      heal spur removed left foot    HX OTHER SURGICAL      hand surgery, heal spur L    UPPER GI ENDOSCOPY,BIOPSY  5/16/2018         UPPER GI ENDOSCOPY,DILATN W GUIDE  5/16/2018          Current Outpatient Prescriptions on File Prior to Visit   Medication Sig Dispense Refill    lansoprazole (PREVACID) 30 mg capsule Take 1 Cap by mouth Daily (before breakfast). 90 Cap 0    ALTAGRACIA PEN NEEDLE 32 gauge x 5/32\" ndle USE 1 NEEDLE UNDER THE SKIN DAILY 90 Pen Needle 3    exenatide microspheres (BYDUREON) 2 mg/0.65 mL pnij 2 mg by SubCUTAneous route every seven (7) days. For diabetes 8 mL 3    Telmisartan-amLODIPine 80-5 mg tab TAKE 1 TABLET NIGHTLY FOR BLOOD PRESSURE 90 Tab 1    metFORMIN (GLUCOPHAGE) 1,000 mg tablet Take 1 Tab by mouth two (2) times a day. 180 Tab 1    insulin glargine (LANTUS SOLOSTAR) 100 unit/mL (3 mL) inpn 55 Units by SubCUTAneous route daily.  15 Pen 3    COUMADIN 4 mg tablet TAKE 2 TABLETS DAILY 180 Tab 2    cholecalciferol, VITAMIN D3, (VITAMIN D3) 5,000 unit tab tablet Take  by mouth daily. Nature Made      cyanocobalamin (VITAMIN B-12) 1,000 mcg tablet Take 1,000 mcg by mouth daily. Nature Made      magnesium oxide (MAG-OX) 400 mg tablet Take 1 Tab by mouth four (4) times daily. For low magnesium (Patient taking differently: Take 1,600 mg by mouth daily. For low magnesium) 360 Tab 3    Insulin Needles, Disposable, (BD INSULIN PEN NEEDLE UF MINI) 31 x 3/16 \" ndle 1 each by SubCUTAneous route daily. 100 each 3    cyclobenzaprine (FLEXERIL) 10 mg tablet Take  by mouth three (3) times daily as needed for Muscle Spasm(s).  diclofenac (VOLTAREN) 1 % gel Apply  to affected area four (4) times daily. 100 g 0     No current facility-administered medications on file prior to visit. Objective:     Vitals:    05/23/18 1023   BP: 116/65   Pulse: 67   Resp: 18   Temp: 97.5 °F (36.4 °C)   TempSrc: Oral   SpO2: 97%   Weight: 274 lb 3.2 oz (124.4 kg)   Height: 5' 11\" (1.803 m)     Physical Examination:  General appearance - alert, well appearing, and in no distress  Eyes -sclera anicteric  Neck - supple, no significant adenopathy, no thyromegaly  Chest - clear to auscultation, no wheezes, rales or rhonchi, symmetric air entry  Heart - normal rate, regular rhythm, normal S1, S2, no murmurs, rubs, clicks or gallops  Neurological - alert, oriented, no focal findings or movement disorder noted  Extr - trace edema    Assessment/ Plan:   Diagnoses and all orders for this visit:    1. Hypercoagulable state ([address]  2. History of recurrent deep vein thrombosis (DVT)  3. Encounter for monitoring coumadin therapy  INR supratherapeutic prev and now subtherapeutic d/t stopping coumadin prior to a procedure. Doing well with home INR machine.  -     AMB POC PT/INR    4.  Risk for falls - sending to PT for global strengthening  -     REFERRAL TO PHYSICAL THERAPY  -     AMB SUPPLY ORDER    5. Abnormal echocardiogram - concerns with abn wall motion and decr EF to 40-45% so will get stress test and send to Cardiology as I suspect he may end up needing to get cath  -     NM CARDIAC SPECT W STRS/REST MULT; Future  -     REFERRAL TO CARDIOLOGY    6. Primary osteoarthritis of left shoulder - for PT  -     REFERRAL TO PHYSICAL THERAPY    7. Chronic cough - Ongoing issue, partly related to his GERD but would also noted some pleural and parenchymal changes on x-rays. Will get Chest CT to clarify this further.  -     CT CHEST WO CONT; Future    8. Dyspnea on exertion - as above, needs to lose weight as well  -     CT CHEST WO CONT; Future  -     REFERRAL TO CARDIOLOGY    I spent > 50% of the 40 min visit counseling and educating about dyspnea on exertion. I have discussed the diagnosis with the patient and the intended plan as seen in the above orders. The patient has received an after-visit summary and questions were answered concerning future plans. I have discussed medication side effects and warnings with the patient as well. The patient verbalizes understanding and agreement with the plan. Follow-up Disposition:  Return in about 8 weeks (around 7/19/2018) for Regular Follow up.

## 2018-05-23 NOTE — PROGRESS NOTES
Chief Complaint   Patient presents with    Follow-up     PT/INR   Restarted Coumadin on Sunday  Room 7

## 2018-05-27 DIAGNOSIS — I10 ESSENTIAL HYPERTENSION: ICD-10-CM

## 2018-05-27 RX ORDER — TELMISARTAN AND AMLODIPINE 5; 80 MG/1; MG/1
TABLET ORAL
Qty: 90 TAB | Refills: 1 | Status: SHIPPED | OUTPATIENT
Start: 2018-05-27 | End: 2018-09-04 | Stop reason: SDUPTHER

## 2018-06-01 ENCOUNTER — HOSPITAL ENCOUNTER (OUTPATIENT)
Dept: CT IMAGING | Age: 83
Discharge: HOME | End: 2018-06-01
Attending: FAMILY MEDICINE
Payer: MEDICARE

## 2018-06-01 DIAGNOSIS — R06.09 DYSPNEA ON EXERTION: ICD-10-CM

## 2018-06-01 DIAGNOSIS — R05.3 CHRONIC COUGH: ICD-10-CM

## 2018-06-01 PROCEDURE — 71250 CT THORAX DX C-: CPT

## 2018-06-04 RX ORDER — FINASTERIDE 5 MG/1
TABLET, FILM COATED ORAL
Qty: 30 TAB | Refills: 4 | Status: SHIPPED | OUTPATIENT
Start: 2018-06-04 | End: 2018-08-15 | Stop reason: SDUPTHER

## 2018-06-12 ENCOUNTER — TELEPHONE (OUTPATIENT)
Dept: FAMILY MEDICINE CLINIC | Age: 83
End: 2018-06-12

## 2018-06-12 NOTE — TELEPHONE ENCOUNTER
Amberly palencia/MDINR     Wants to report labs - out of range INR     Best number to reach her is [phone]  Ext [phone]

## 2018-06-12 NOTE — TELEPHONE ENCOUNTER
Returned phone call to Stanton HANNON/INR . Advised PT/INR 1. 4. Spoke with patient and was advised medication stopped on Sunday. Cardiac Cath scheduled for Friday 6/15/18.

## 2018-06-14 DIAGNOSIS — E11.9 TYPE 2 DIABETES MELLITUS WITHOUT COMPLICATION, WITH LONG-TERM CURRENT USE OF INSULIN (HCC): ICD-10-CM

## 2018-06-14 DIAGNOSIS — K21.9 GASTROESOPHAGEAL REFLUX DISEASE, ESOPHAGITIS PRESENCE NOT SPECIFIED: ICD-10-CM

## 2018-06-14 DIAGNOSIS — Z79.4 TYPE 2 DIABETES MELLITUS WITHOUT COMPLICATION, WITH LONG-TERM CURRENT USE OF INSULIN (HCC): ICD-10-CM

## 2018-06-15 ENCOUNTER — HOSPITAL ENCOUNTER (OUTPATIENT)
Dept: CARDIAC CATH/INVASIVE PROCEDURES | Age: 83
Discharge: HOME | End: 2018-06-16
Attending: INTERNAL MEDICINE | Admitting: INTERNAL MEDICINE
Payer: MEDICARE

## 2018-06-15 LAB
GLUCOSE BLD STRIP.AUTO-MCNC: 231 MG/DL (ref 65–100)
GLUCOSE BLD STRIP.AUTO-MCNC: 93 MG/DL (ref 65–100)
GLUCOSE BLD STRIP.AUTO-MCNC: 95 MG/DL (ref 65–100)
INR BLD: <0.9 (ref 0.9–1.2)
SERVICE CMNT-IMP: ABNORMAL
SERVICE CMNT-IMP: NORMAL
SERVICE CMNT-IMP: NORMAL

## 2018-06-15 PROCEDURE — 74011000258 HC RX REV CODE- 258: Performed by: INTERNAL MEDICINE

## 2018-06-15 PROCEDURE — 77030019697 HC SYR ANGI INFL MRTM -B

## 2018-06-15 PROCEDURE — 74011250636 HC RX REV CODE- 250/636: Performed by: INTERNAL MEDICINE

## 2018-06-15 PROCEDURE — 82962 GLUCOSE BLOOD TEST: CPT

## 2018-06-15 PROCEDURE — 85610 PROTHROMBIN TIME: CPT

## 2018-06-15 PROCEDURE — 77030015766

## 2018-06-15 PROCEDURE — 93458 L HRT ARTERY/VENTRICLE ANGIO: CPT

## 2018-06-15 PROCEDURE — C1725 CATH, TRANSLUMIN NON-LASER: HCPCS

## 2018-06-15 PROCEDURE — 77030010221 HC SPLNT WR POS TELE -B

## 2018-06-15 PROCEDURE — C1894 INTRO/SHEATH, NON-LASER: HCPCS

## 2018-06-15 PROCEDURE — 74011250637 HC RX REV CODE- 250/637: Performed by: INTERNAL MEDICINE

## 2018-06-15 PROCEDURE — 77030004549 HC CATH ANGI DX PRF MRTM -A

## 2018-06-15 PROCEDURE — C1769 GUIDE WIRE: HCPCS

## 2018-06-15 PROCEDURE — C1874 STENT, COATED/COV W/DEL SYS: HCPCS

## 2018-06-15 PROCEDURE — 74011636320 HC RX REV CODE- 636/320

## 2018-06-15 PROCEDURE — 74011250637 HC RX REV CODE- 250/637

## 2018-06-15 PROCEDURE — 74011250636 HC RX REV CODE- 250/636

## 2018-06-15 PROCEDURE — 77030008543 HC TBNG MON PRSS MRTM -A

## 2018-06-15 PROCEDURE — 77030019569 HC BND COMPR RAD TERU -B

## 2018-06-15 PROCEDURE — C1887 CATHETER, GUIDING: HCPCS

## 2018-06-15 PROCEDURE — 77030019698 HC SYR ANGI MDLON MRTM -A

## 2018-06-15 PROCEDURE — 74011636320 HC RX REV CODE- 636/320: Performed by: INTERNAL MEDICINE

## 2018-06-15 PROCEDURE — 74011000250 HC RX REV CODE- 250

## 2018-06-15 RX ORDER — METFORMIN HYDROCHLORIDE 1000 MG/1
1000 TABLET ORAL 2 TIMES DAILY
Qty: 180 TAB | Refills: 1 | Status: SHIPPED | OUTPATIENT
Start: 2018-06-15 | End: 2019-01-14 | Stop reason: SDUPTHER

## 2018-06-15 RX ORDER — LIDOCAINE HYDROCHLORIDE 10 MG/ML
INJECTION, SOLUTION EPIDURAL; INFILTRATION; INTRACAUDAL; PERINEURAL
Status: COMPLETED
Start: 2018-06-15 | End: 2018-06-15

## 2018-06-15 RX ORDER — ACETAMINOPHEN 325 MG/1
650 TABLET ORAL
Status: DISCONTINUED | OUTPATIENT
Start: 2018-06-15 | End: 2018-06-16 | Stop reason: HOSPADM

## 2018-06-15 RX ORDER — FENTANYL CITRATE 50 UG/ML
INJECTION, SOLUTION INTRAMUSCULAR; INTRAVENOUS
Status: COMPLETED
Start: 2018-06-15 | End: 2018-06-15

## 2018-06-15 RX ORDER — CLOPIDOGREL BISULFATE 75 MG/1
75 TABLET ORAL DAILY
Status: DISCONTINUED | OUTPATIENT
Start: 2018-06-16 | End: 2018-06-16 | Stop reason: HOSPADM

## 2018-06-15 RX ORDER — ONDANSETRON 2 MG/ML
4 INJECTION INTRAMUSCULAR; INTRAVENOUS
Status: DISCONTINUED | OUTPATIENT
Start: 2018-06-15 | End: 2018-06-16 | Stop reason: HOSPADM

## 2018-06-15 RX ORDER — SODIUM CHLORIDE 0.9 % (FLUSH) 0.9 %
5-10 SYRINGE (ML) INJECTION EVERY 8 HOURS
Status: DISCONTINUED | OUTPATIENT
Start: 2018-06-15 | End: 2018-06-16 | Stop reason: HOSPADM

## 2018-06-15 RX ORDER — WARFARIN 10 MG/1
10 TABLET ORAL ONCE
Status: COMPLETED | OUTPATIENT
Start: 2018-06-15 | End: 2018-06-15

## 2018-06-15 RX ORDER — GUAIFENESIN 100 MG/5ML
81 LIQUID (ML) ORAL DAILY
Status: DISCONTINUED | OUTPATIENT
Start: 2018-06-16 | End: 2018-06-15

## 2018-06-15 RX ORDER — SODIUM CHLORIDE 0.9 % (FLUSH) 0.9 %
5-10 SYRINGE (ML) INJECTION AS NEEDED
Status: DISCONTINUED | OUTPATIENT
Start: 2018-06-15 | End: 2018-06-16 | Stop reason: HOSPADM

## 2018-06-15 RX ORDER — SODIUM CHLORIDE 9 MG/ML
100 INJECTION, SOLUTION INTRAVENOUS
Status: COMPLETED | OUTPATIENT
Start: 2018-06-15 | End: 2018-06-15

## 2018-06-15 RX ORDER — VERAPAMIL HYDROCHLORIDE 2.5 MG/ML
INJECTION, SOLUTION INTRAVENOUS
Status: COMPLETED
Start: 2018-06-15 | End: 2018-06-15

## 2018-06-15 RX ORDER — FENTANYL CITRATE 50 UG/ML
25-50 INJECTION, SOLUTION INTRAMUSCULAR; INTRAVENOUS
Status: DISCONTINUED | OUTPATIENT
Start: 2018-06-15 | End: 2018-06-15 | Stop reason: HOSPADM

## 2018-06-15 RX ORDER — HEPARIN SODIUM 200 [USP'U]/100ML
500 INJECTION, SOLUTION INTRAVENOUS ONCE
Status: COMPLETED | OUTPATIENT
Start: 2018-06-15 | End: 2018-06-15

## 2018-06-15 RX ORDER — MIDAZOLAM HYDROCHLORIDE 1 MG/ML
.5-2 INJECTION, SOLUTION INTRAMUSCULAR; INTRAVENOUS
Status: DISCONTINUED | OUTPATIENT
Start: 2018-06-15 | End: 2018-06-15 | Stop reason: HOSPADM

## 2018-06-15 RX ORDER — SODIUM CHLORIDE 0.9 % (FLUSH) 0.9 %
5-10 SYRINGE (ML) INJECTION AS NEEDED
Status: CANCELLED | OUTPATIENT
Start: 2018-06-15

## 2018-06-15 RX ORDER — METOPROLOL SUCCINATE 25 MG/1
12.5 TABLET, EXTENDED RELEASE ORAL DAILY
Status: DISCONTINUED | OUTPATIENT
Start: 2018-06-16 | End: 2018-06-16 | Stop reason: HOSPADM

## 2018-06-15 RX ORDER — HEPARIN SODIUM 200 [USP'U]/100ML
INJECTION, SOLUTION INTRAVENOUS
Status: COMPLETED
Start: 2018-06-15 | End: 2018-06-15

## 2018-06-15 RX ORDER — VERAPAMIL HYDROCHLORIDE 2.5 MG/ML
INJECTION, SOLUTION INTRAVENOUS
Status: DISCONTINUED
Start: 2018-06-15 | End: 2018-06-16 | Stop reason: HOSPADM

## 2018-06-15 RX ORDER — GUAIFENESIN 100 MG/5ML
LIQUID (ML) ORAL
Status: COMPLETED
Start: 2018-06-15 | End: 2018-06-15

## 2018-06-15 RX ORDER — CLOPIDOGREL 300 MG/1
TABLET, FILM COATED ORAL
Status: COMPLETED
Start: 2018-06-15 | End: 2018-06-15

## 2018-06-15 RX ORDER — DOCUSATE SODIUM 100 MG/1
100 CAPSULE, LIQUID FILLED ORAL 2 TIMES DAILY
Status: DISCONTINUED | OUTPATIENT
Start: 2018-06-15 | End: 2018-06-16 | Stop reason: HOSPADM

## 2018-06-15 RX ORDER — MIDAZOLAM HYDROCHLORIDE 1 MG/ML
INJECTION, SOLUTION INTRAMUSCULAR; INTRAVENOUS
Status: COMPLETED
Start: 2018-06-15 | End: 2018-06-15

## 2018-06-15 RX ORDER — LIDOCAINE HYDROCHLORIDE 10 MG/ML
INJECTION, SOLUTION EPIDURAL; INFILTRATION; INTRACAUDAL; PERINEURAL
Status: DISCONTINUED
Start: 2018-06-15 | End: 2018-06-16 | Stop reason: HOSPADM

## 2018-06-15 RX ORDER — HEPARIN SODIUM 1000 [USP'U]/ML
INJECTION, SOLUTION INTRAVENOUS; SUBCUTANEOUS
Status: COMPLETED
Start: 2018-06-15 | End: 2018-06-15

## 2018-06-15 RX ORDER — LANSOPRAZOLE 30 MG/1
30 CAPSULE, DELAYED RELEASE ORAL
Qty: 90 CAP | Refills: 1 | Status: SHIPPED | OUTPATIENT
Start: 2018-06-15 | End: 2019-01-14 | Stop reason: SDUPTHER

## 2018-06-15 RX ORDER — MORPHINE SULFATE 10 MG/ML
4 INJECTION, SOLUTION INTRAMUSCULAR; INTRAVENOUS
Status: DISCONTINUED | OUTPATIENT
Start: 2018-06-15 | End: 2018-06-16 | Stop reason: HOSPADM

## 2018-06-15 RX ORDER — DEXTROSE 50 % IN WATER (D50W) INTRAVENOUS SYRINGE
12.5-25 AS NEEDED
Status: DISCONTINUED | OUTPATIENT
Start: 2018-06-15 | End: 2018-06-16 | Stop reason: HOSPADM

## 2018-06-15 RX ORDER — CLOPIDOGREL 300 MG/1
600 TABLET, FILM COATED ORAL ONCE
Status: COMPLETED | OUTPATIENT
Start: 2018-06-15 | End: 2018-06-15

## 2018-06-15 RX ORDER — MAGNESIUM SULFATE 100 %
4 CRYSTALS MISCELLANEOUS AS NEEDED
Status: DISCONTINUED | OUTPATIENT
Start: 2018-06-15 | End: 2018-06-16 | Stop reason: HOSPADM

## 2018-06-15 RX ORDER — HYDROCODONE BITARTRATE AND ACETAMINOPHEN 5; 325 MG/1; MG/1
1 TABLET ORAL
Status: DISCONTINUED | OUTPATIENT
Start: 2018-06-15 | End: 2018-06-16 | Stop reason: HOSPADM

## 2018-06-15 RX ORDER — SODIUM CHLORIDE 0.9 % (FLUSH) 0.9 %
5-10 SYRINGE (ML) INJECTION EVERY 8 HOURS
Status: CANCELLED | OUTPATIENT
Start: 2018-06-15

## 2018-06-15 RX ORDER — VERAPAMIL HYDROCHLORIDE 2.5 MG/ML
2.5 INJECTION, SOLUTION INTRAVENOUS ONCE
Status: COMPLETED | OUTPATIENT
Start: 2018-06-15 | End: 2018-06-15

## 2018-06-15 RX ORDER — SODIUM CHLORIDE 900 MG/100ML
INJECTION INTRAVENOUS
Status: DISCONTINUED
Start: 2018-06-15 | End: 2018-06-16 | Stop reason: HOSPADM

## 2018-06-15 RX ORDER — CYCLOBENZAPRINE HCL 10 MG
10 TABLET ORAL
Status: DISCONTINUED | OUTPATIENT
Start: 2018-06-15 | End: 2018-06-16 | Stop reason: HOSPADM

## 2018-06-15 RX ORDER — INSULIN LISPRO 100 [IU]/ML
INJECTION, SOLUTION INTRAVENOUS; SUBCUTANEOUS
Status: DISCONTINUED | OUTPATIENT
Start: 2018-06-15 | End: 2018-06-16 | Stop reason: HOSPADM

## 2018-06-15 RX ORDER — GUAIFENESIN 100 MG/5ML
81 LIQUID (ML) ORAL DAILY
Status: DISCONTINUED | OUTPATIENT
Start: 2018-06-16 | End: 2018-06-16 | Stop reason: HOSPADM

## 2018-06-15 RX ORDER — LIDOCAINE HYDROCHLORIDE 10 MG/ML
1-30 INJECTION, SOLUTION EPIDURAL; INFILTRATION; INTRACAUDAL; PERINEURAL
Status: DISCONTINUED | OUTPATIENT
Start: 2018-06-15 | End: 2018-06-15 | Stop reason: HOSPADM

## 2018-06-15 RX ORDER — NALOXONE HYDROCHLORIDE 0.4 MG/ML
0.4 INJECTION, SOLUTION INTRAMUSCULAR; INTRAVENOUS; SUBCUTANEOUS AS NEEDED
Status: DISCONTINUED | OUTPATIENT
Start: 2018-06-15 | End: 2018-06-16 | Stop reason: HOSPADM

## 2018-06-15 RX ORDER — HEPARIN SODIUM 1000 [USP'U]/ML
1000-10000 INJECTION, SOLUTION INTRAVENOUS; SUBCUTANEOUS
Status: DISCONTINUED | OUTPATIENT
Start: 2018-06-15 | End: 2018-06-15 | Stop reason: HOSPADM

## 2018-06-15 RX ORDER — FINASTERIDE 5 MG/1
5 TABLET, FILM COATED ORAL DAILY
Status: DISCONTINUED | OUTPATIENT
Start: 2018-06-16 | End: 2018-06-16 | Stop reason: HOSPADM

## 2018-06-15 RX ORDER — BIVALIRUDIN 250 MG/5ML
INJECTION, POWDER, LYOPHILIZED, FOR SOLUTION INTRAVENOUS
Status: DISCONTINUED
Start: 2018-06-15 | End: 2018-06-16 | Stop reason: HOSPADM

## 2018-06-15 RX ORDER — PANTOPRAZOLE SODIUM 40 MG/1
40 TABLET, DELAYED RELEASE ORAL DAILY
Status: DISCONTINUED | OUTPATIENT
Start: 2018-06-16 | End: 2018-06-16 | Stop reason: HOSPADM

## 2018-06-15 RX ADMIN — Medication 10 ML: at 16:40

## 2018-06-15 RX ADMIN — HEPARIN SODIUM 1000 UNITS: 200 INJECTION, SOLUTION INTRAVENOUS at 15:23

## 2018-06-15 RX ADMIN — WARFARIN SODIUM 10 MG: 10 TABLET ORAL at 17:12

## 2018-06-15 RX ADMIN — VERAPAMIL HYDROCHLORIDE 2.5 MG: 2.5 INJECTION, SOLUTION INTRAVENOUS at 15:27

## 2018-06-15 RX ADMIN — MIDAZOLAM HYDROCHLORIDE 1 MG: 1 INJECTION, SOLUTION INTRAMUSCULAR; INTRAVENOUS at 14:57

## 2018-06-15 RX ADMIN — NITROGLYCERIN 200 MCG: 5 INJECTION, SOLUTION INTRAVENOUS at 15:26

## 2018-06-15 RX ADMIN — IOPAMIDOL 130 ML: 755 INJECTION, SOLUTION INTRAVENOUS at 15:47

## 2018-06-15 RX ADMIN — BIVALIRUDIN 1.75 MG/KG/HR: 250 INJECTION, POWDER, LYOPHILIZED, FOR SOLUTION INTRAVENOUS at 15:44

## 2018-06-15 RX ADMIN — MIDAZOLAM HYDROCHLORIDE 1 MG: 1 INJECTION, SOLUTION INTRAMUSCULAR; INTRAVENOUS at 15:45

## 2018-06-15 RX ADMIN — FENTANYL CITRATE 25 MCG: 50 INJECTION, SOLUTION INTRAMUSCULAR; INTRAVENOUS at 15:30

## 2018-06-15 RX ADMIN — HEPARIN SODIUM 5000 UNITS: 1000 INJECTION, SOLUTION INTRAVENOUS; SUBCUTANEOUS at 15:26

## 2018-06-15 RX ADMIN — FENTANYL CITRATE 25 MCG: 50 INJECTION, SOLUTION INTRAMUSCULAR; INTRAVENOUS at 15:49

## 2018-06-15 RX ADMIN — FENTANYL CITRATE 25 MCG: 50 INJECTION, SOLUTION INTRAMUSCULAR; INTRAVENOUS at 15:21

## 2018-06-15 RX ADMIN — MIDAZOLAM HYDROCHLORIDE 1 MG: 1 INJECTION, SOLUTION INTRAMUSCULAR; INTRAVENOUS at 15:48

## 2018-06-15 RX ADMIN — IOPAMIDOL 50 ML: 755 INJECTION, SOLUTION INTRAVENOUS at 15:53

## 2018-06-15 RX ADMIN — FENTANYL CITRATE 25 MCG: 50 INJECTION, SOLUTION INTRAMUSCULAR; INTRAVENOUS at 14:57

## 2018-06-15 RX ADMIN — LIDOCAINE HYDROCHLORIDE 2 ML: 10 INJECTION, SOLUTION EPIDURAL; INFILTRATION; INTRACAUDAL; PERINEURAL at 15:24

## 2018-06-15 RX ADMIN — VERAPAMIL HYDROCHLORIDE 2.5 MG: 2.5 INJECTION INTRAVENOUS at 15:27

## 2018-06-15 RX ADMIN — CLOPIDOGREL 600 MG: 300 TABLET, FILM COATED ORAL at 15:53

## 2018-06-15 RX ADMIN — ASPIRIN 81 MG: 81 TABLET, CHEWABLE ORAL at 13:36

## 2018-06-15 RX ADMIN — SODIUM CHLORIDE 100 ML/HR: 900 INJECTION, SOLUTION INTRAVENOUS at 16:09

## 2018-06-15 RX ADMIN — CLOPIDOGREL BISULFATE 600 MG: 300 TABLET, FILM COATED ORAL at 15:53

## 2018-06-15 RX ADMIN — Medication 10 ML: at 23:23

## 2018-06-15 RX ADMIN — MIDAZOLAM HYDROCHLORIDE 1 MG: 1 INJECTION, SOLUTION INTRAMUSCULAR; INTRAVENOUS at 15:21

## 2018-06-15 NOTE — IP AVS SNAPSHOT
[address]45 [facility] 
[phone] Patient: Melody Carrington MRN: [id number] CPY:[id number]/77/[id number] About your hospitalization You were admitted on:  Courtney 15, 2018 You last received care in the:  [facility] 2 INTRVNTNL CARDIO You were discharged on:  June 16, 2018 Why you were hospitalized Your primary diagnosis was:  Not on File Follow-up Information Follow up With Details Comments Contact Info Leslie Rivera III, DO Schedule an appointment as soon as possible for a visit in 2 weeks  [address] 085 [facility] 
[phone] Maria Victoria Rosenthal MD Schedule an appointment as soon as possible for a visit in 1 week  [address] [place] [address] [phone] Your Scheduled Appointments Thursday July 12, 2018  9:50 AM EDT ROUTINE CARE with Maria Victoria Rosenthal MD  
[facility] [place] [facility] [address] [facility]) [address] [facility] [address] [place] [address]  
[phone] Discharge Orders None A check davina indicates which time of day the medication should be taken. My Medications START taking these medications Instructions Each Dose to Equal  
 Morning Noon Evening Bedtime  
 aspirin 81 mg chewable tablet Your last dose was: Your next dose is: Take 1 Tab by mouth daily. 81 mg  
    
   
   
   
  
 clopidogrel 75 mg Tab Commonly known as:  PLAVIX Your last dose was: Your next dose is: Take 1 Tab by mouth daily. 75 mg  
    
   
   
   
  
 metoprolol succinate 25 mg XL tablet Commonly known as:  TOPROL-XL Your last dose was: Your next dose is: Take 0.5 Tabs by mouth daily. 12.5 mg  
    
   
   
   
  
  
CHANGE how you take these medications Instructions Each Dose to Equal  
 Morning Noon Evening Bedtime magnesium oxide 400 mg tablet Commonly known as:  MAG-OX What changed:   
- how much to take - when to take this 
- additional instructions Your last dose was: Your next dose is: Take 1 Tab by mouth four (4) times daily. For low magnesium 400 mg CONTINUE taking these medications Instructions Each Dose to Equal  
 Morning Noon Evening Bedtime COUMADIN 4 mg tablet Generic drug:  warfarin Your last dose was: Your next dose is: TAKE 2 TABLETS DAILY exenatide microspheres 2 mg/0.65 mL Pnij Commonly known as:  BYDUREON Your last dose was: Your next dose is:    
   
   
 2 mg by SubCUTAneous route every seven (7) days. For diabetes 2 mg  
    
   
   
   
  
 finasteride 5 mg tablet Commonly known as:  PROSCAR Your last dose was: Your next dose is: TAKE 1 TABLET DAILY FOR SYMPTOMATIC BENIGN PROSTATIC HYPERPLASIA FLEXERIL 10 mg tablet Generic drug:  cyclobenzaprine Your last dose was: Your next dose is: Take  by mouth three (3) times daily as needed for Muscle Spasm(s). insulin glargine 100 unit/mL (3 mL) Inpn Commonly known as:  LANTUS SOLOSTAR U-100 INSULIN Your last dose was: Your next dose is:    
   
   
 55 Units by SubCUTAneous route daily. 55 Units Insulin Needles (Disposable) 31 gauge x 3/16\" Ndle Commonly known as:  BD ULTRA-FINE MINI PEN NEEDLE Your last dose was: Your next dose is:    
   
   
 1 each by SubCUTAneous route daily. 1 Each Linda Pen Needle 32 gauge x 5/32\" Ndle Generic drug:  Insulin Needles (Disposable) Your last dose was: Your next dose is:    
   
   
 USE 1 NEEDLE UNDER THE SKIN DAILY  
     
   
   
   
  
 lansoprazole 30 mg capsule Commonly known as:  PREVACID Your last dose was: Your next dose is: Take 1 Cap by mouth Daily (before breakfast). 30 mg  
    
   
   
   
  
 metFORMIN 1,000 mg tablet Commonly known as:  GLUCOPHAGE Your last dose was: Your next dose is: Take 1 Tab by mouth two (2) times a day. 1000 mg Telmisartan-amLODIPine 80-5 mg Tab Your last dose was: Your next dose is: TAKE 1 TABLET NIGHTLY FOR BLOOD PRESSURE  
     
   
   
   
  
 VITAMIN B-12 1,000 mcg tablet Generic drug:  cyanocobalamin Your last dose was: Your next dose is: Take 1,000 mcg by mouth daily. Nature Made  
 1000 mcg VITAMIN D3 5,000 unit Tab tablet Generic drug:  cholecalciferol (VITAMIN D3) Your last dose was: Your next dose is: Take  by mouth daily. Nature Made Where to Get Your Medications These medications were sent to [facility] Lisa Wright, [address] [place] [address] Phone:  [phone]  
  lansoprazole 30 mg capsule  
 metFORMIN 1,000 mg tablet Information on where to get these meds will be given to you by the nurse or doctor. ! Ask your nurse or doctor about these medications  
  aspirin 81 mg chewable tablet  
 clopidogrel 75 mg Tab  
 metoprolol succinate 25 mg XL tablet Discharge Instructions [address] [facility], [address]   [phone] [address]    www.[employer] Patient Discharge Instructions Juan A Hollis / [id number] : 1934 Admitted 6/15/2018 Discharged: 2018 · It is important that you take the medication exactly as they are prescribed.   
· Keep your medication in the bottles provided by the pharmacist and keep a list of the medication names, dosages, and times to be taken in your wallet. · Do not take other medications without consulting your doctor. BRING ALL OF YOUR MEDICINES TO YOUR OFFICE VISIT with Chasity Corona DO or my nurse practitioner, Adalgisa Fish. Carlos Mcrae Follow-up with Chasity Corona DO or my nurse practitioner, Adalgisa Fish in 2 weeks. Cardiac Catheterization  Discharge Instructions Transradial Catheterization Discharge Instructions (WRIST) Discharge instructions: Your radial artery in your wrist was used for your cardiac catheterization. This site may be slightly bruised and sore following your procedure. Expect mild tingling or the hand and tenderness at the puncture site for up to 3 days. Excess movement of the wrist used should be avoided for the next 24-48 hours. 1. No lifting over 2 pounds (approximately a ½ gallon of milk) with this arm for 24 hours. 2. Keep the site of the procedure covered with a bandage for 24 hours. 3. You may shower the day after your procedure. Do not take a tub bath or submerge the puncture site in water for 48 hours. 4. No heavy impact activity/lifting > 30 pounds for 1 week. If bleeding of the wrist occurs at home: If the site on your wrist where you had the catheterization procedure begins to bleed, do not panic. 1. Place 1 or 2 fingers over the puncture site and hold pressure to stop the bleeding. You may be able to feel your pulse as you hold pressure. 2. Lift your fingers after 5 minutes to see if the bleeding has stopped. 3. Once the bleeding has stopped, gently wipe the wrist area clean and cover with a bandage. If the bleeding from your wrist does not stop after 15 minutes, or if there is a large amount of bleeding or spurting, call 911 immediately (do not drive yourself to the hospital). Other concerns: The site may be slightly bruised and sore following your procedure.  Should any of the following occur, contact your physician immediately: 1. Any cool or coldness of the arm, discoloration over a large area, ongoing numbness or any abnormal sensations , moderate to severe pain or swelling in the arm. 2. Redness, soreness, swelling, chills or fever, or colored drainage at the procedure site within 3-7 days after your procedure. If you have any further questions or concerns regarding your procedure please call the Cardiac Cath Lab office at [phone]. During regular business hours ask to speak to Dr. Ed Watts. During non-business hours the answering service will answer. Ask to speak to the physician on call for [facility].  
 
Transfemoral Catheterization Discharge Instructions (GROIN) ? Do not drive, operate any machinery, or sign any legal documents for 24 hours after your procedure. You must have someone to drive you home. ? You may take a shower 24 hours after your cardiac catheterization. Be sure to get the dressing wet and then remove it; gently wash the area with warm soapy water. Pat dry and leave open to air. To help prevent infections, be sure to keep the cath site clean and dry. No lotions, creams, powders, ointments, etc. in the cath site for approximately 1 week. ? Do not take a tub bath, get in a hot tub or swimming pool for approximately 5 days or until the cath site is completely healed. ? No strenuous activity or heavy lifting over 10 lbs. for 7 days. ? Drink plenty of fluids for 24-48 hours after your cath to flush the contrast dye from your kidneys. No alcoholic beverages for 24 hours. You may resume your previous diet (low fat, low cholesterol) after your cath. ? After your cath, some bruising or discomfort is common during the healing process. Tylenol, 1-2 tablets every 6 hours as needed, is recommended if you experience any discomfort.   If you experience any signs or symptoms of infection such as fever, chills, or poorly healing incision, persistent tenderness or swelling in the groin, redness and/or warmth to the touch, numbness, significant tingling or pain at the groin site or affected extremity, rash, drainage from the cath site, or if the leg feels tight or swollen, call your physician right away. ? If bleeding at the cath site occurs, take a clean gauze pad and apply direct pressure to the groin just above the puncture site. Call 911 immediately, and continue to apply direct pressure until an ambulance gets to your location. ? You may return to work  2  days after your cardiac cath if no groin bleeding. Information obtained by : 
I understand that if any problems occur once I am at home I am to contact my physician. I understand and acknowledge receipt of the instructions indicated above. R.N.'s Signature                                                                  Date/Time Patient or Representative Signature                                                          Date/Time Amparo Higgins [facility], [address] [facility] [address] [place] [address], [address] [phone] [address]    www.[employer] Introducing [facility]! [facility] introduces Tauntr patient portal. Now you can access parts of your medical record, email your doctor's office, and request medication refills online. 1. In your internet browser, go to [email or web address] 2. Click on the First Time User? Click Here link in the Sign In box. You will see the New Member Sign Up page. 3. Enter your RxCost Containment Access Code exactly as it appears below. You will not need to use this code after youve completed the sign-up process. If you do not sign up before the expiration date, you must request a new code. · RxCost Containment Access Code: [email or web address] Expires: 7/17/2018 12:59 PM 
 
4. Enter the last four digits of your Social Security Number (xxxx) and Date of Birth (mm/dd/yyyy) as indicated and click Submit. You will be taken to the next sign-up page. 5. Create a WeGamet ID. This will be your RxCost Containment login ID and cannot be changed, so think of one that is secure and easy to remember. 6. Create a RxCost Containment password. You can change your password at any time. 7. Enter your Password Reset Question and Answer. This can be used at a later time if you forget your password. 8. Enter your e-mail address. You will receive e-mail notification when new information is available in [address] 9. Click Sign Up. You can now view and download portions of your medical record. 10. Click the Download Summary menu link to download a portable copy of your medical information. If you have questions, please visit the Frequently Asked Questions section of the RxCost Containment website. Remember, RxCost Containment is NOT to be used for urgent needs. For medical emergencies, dial 911. Now available from your iPhone and Android! Introducing Volodymyr Quijano As a Noelle Andrade patient, I wanted to make you aware of our electronic visit tool called Volodymyr Agustinmillicent. Noelle Andrade 24/7 allows you to connect within minutes with a medical provider 24 hours a day, seven days a week via a mobile device or tablet or logging into a secure website from your computer. You can access Volodymyr Agustinestebanfin from anywhere in the [place].  
 
A virtual visit might be right for you when you have a simple condition and feel like you just dont want to get out of bed, or cant get away from work for an appointment, when your regular [facility] provider is not available (evenings, weekends or holidays), or when youre out of town and need minor care. Electronic visits cost only $49 and if the [employer] [facility] 24/7 provider determines a prescription is needed to treat your condition, one can be electronically transmitted to a nearby pharmacy*. Please take a moment to enroll today if you have not already done so. The enrollment process is free and takes just a few minutes. To enroll, please download the [employer]/TalentEarth branden to your tablet or phone, or visit [email or web address].[employer] ([email or web address]). org to enroll on your computer. And, as an [address] [place] [address] patient with a [employer] account, the results of your visits will be scanned into your electronic medical record and your primary care provider will be able to view the scanned results. We urge you to continue to see your regular [facility] provider for your ongoing medical care. And while your primary care provider may not be the one available when you seek a Humouno virtual visit, the peace of mind you get from getting a real diagnosis real time can be priceless. For more information on Humouno, view our Frequently Asked Questions (FAQs) at www.[employer] ([email or web address] org. Sincerely, 
 
Ricardo Forte MD 
Chief Medical Officer Daniela8 Abigail Hagan *:  certain medications cannot be prescribed via Humouno Unresulted tests-please follow up with your PCP on these results Procedure/Test Authorizing Provider CBC WITH AUTOMATED DIFF Reba Poplin III, DO  
 METABOLIC PANEL, BASIC Reba Poplin III, DO  
 PROTHROMBIN TIME + INR Reba Poplin III, DO  
  
Providers Seen During Your Hospitalization Provider Specialty Primary office phone [address], DO Cardiology [phone] Your Primary Care Physician (PCP) Primary Care Physician Office Phone Office Fax Reyes Docktara [phone] You are allergic to the following Allergen Reactions Statins-Hmg-Coa Reductase Inhibitors Myalgia Tried several statins. Recent Documentation Height Weight BMI Smoking Status 1.803 m 124.3 kg 38.22 kg/m2 Former Smoker Emergency Contacts Name Discharge Info Relation Home Work Mobile Caity Paiz DISCHARGE CAREGIVER [3] Spouse [3] [phone] Patient Belongings The following personal items are in your possession at time of discharge: 
  Dental Appliances: Lowers, Uppers         Home Medications: Sent to pharmacy   Jewelry: Ring (yellow metal ring)  Clothing: At bedside    Other Valuables: Cell Phone Please provide this summary of care documentation to your next provider. Signatures-by signing, you are acknowledging that this After Visit Summary has been reviewed with you and you have received a copy. Patient Signature:  ____________________________________________________________ Date:  ____________________________________________________________  
  
Beverly Shoaib Provider Signature:  ____________________________________________________________ Date:  ____________________________________________________________

## 2018-06-15 NOTE — ROUTINE PROCESS
TRANSFER - IN REPORT:    Verbal report received from Bel Duarte (name) on Brandon Montanez  being received from [facility]  (unit) for routine post - op      Report consisted of patients Situation, Background, Assessment and   Recommendations(SBAR). Information from the following report(s) Procedure Summary, Recent Results and Cardiac Rhythm SB was reviewed with the receiving nurse. Opportunity for questions and clarification was provided. Assessment completed upon patients arrival to unit and care assumed. Verbal report given to oncoming nurse Doris Cameron RN    Report consisted of patients Situation, Background, Assessment, and   Recommendations    Information was reviewed with the receiving nurse. Opportunity for questions and clarification was provided.       Harsh Arrington RN

## 2018-06-15 NOTE — PROGRESS NOTES
1900 - Bedside report from [facility]. 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  occ PVC and PAC. No complaints. R wrist cath site benign w TR band on and PH at 11cc per nurse. 1920 - TR takedown successful. Sterile dressing applied. 2100 - ambulation in hallway 100 yards without difficulty or complaints. R wrist benign, small amount of flat bruising proximal to cath site. . +PPP.    0700 - Bedside report to RN. NSR 1st degree AVB, BBB, rate in 60's. Occ PVC and PAC. No complaints. Small proximal flat bruise at cath site, no hematoma.

## 2018-06-15 NOTE — PROGRESS NOTES
Brief Procedure Note    Patient: Alicia Billings MRN: [id number]  SSN: [id number]    YOB: 1934  Age: 80 y.o. Sex: male      Date of Procedure: 6/15/2018     Pre-procedure Diagnosis: Abnormal stress    Post-procedure Diagnosis: 1v CAD    Procedure: LHC, PTCA/PCI of mLAD    Performed By: Rylie Hoover III, DO     Anesthesia: Moderate Sedation    Estimated Blood Loss: Less than 10 mL      Specimens:  None    Findings: 85% mLAD, 96% pLPDA    Complications: None    Implants: 1 Synergy ZACKERY    Recommendations: Continue medical therapy.     Signed By: Jo Brown DO     Courtney 15, 2018

## 2018-06-15 NOTE — PROGRESS NOTES
Pharmacy Daily Dosing of Warfarin    Indication: DVT history    Goal INR: 2-3    PTA Warfarin Dose: 8 mg daily    Concurrent anticoagulants:     Concurrent antiplatelet:     Major Interacting Medications    Drugs that may increase INR: None   Drugs that may decrease INR: None    Conditions that may increase/decrease INR (CHF, C. diff, cirrhosis, thyroid disorder, hypoalbuminemia):     Labs:  Recent Labs      06/15/18   1335   INR  <0.9           Impression/Plan: Warfarin 10 mg PO x 1 dose. Daily INR  CBC w/o diff QOD     Pharmacy will follow daily and adjust the dose as appropriate. Thanks  Diana Padilla [facility]      [email or web address]. [email or web address]

## 2018-06-16 VITALS
HEART RATE: 67 BPM | RESPIRATION RATE: 16 BRPM | TEMPERATURE: 97.7 F | BODY MASS INDEX: 38.36 KG/M2 | OXYGEN SATURATION: 97 % | WEIGHT: 274 LBS | DIASTOLIC BLOOD PRESSURE: 51 MMHG | SYSTOLIC BLOOD PRESSURE: 138 MMHG | HEIGHT: 71 IN

## 2018-06-16 LAB
ANION GAP SERPL CALC-SCNC: 9 MMOL/L (ref 5–15)
BASOPHILS # BLD: 0.1 K/UL (ref 0–0.1)
BASOPHILS NFR BLD: 1 % (ref 0–1)
BUN SERPL-MCNC: 14 MG/DL (ref 6–20)
BUN/CREAT SERPL: 14 (ref 12–20)
CALCIUM SERPL-MCNC: 8.4 MG/DL (ref 8.5–10.1)
CHLORIDE SERPL-SCNC: 105 MMOL/L (ref 97–108)
CO2 SERPL-SCNC: 23 MMOL/L (ref 21–32)
CREAT SERPL-MCNC: 1 MG/DL (ref 0.7–1.3)
DIFFERENTIAL METHOD BLD: ABNORMAL
EOSINOPHIL # BLD: 0.3 K/UL (ref 0–0.4)
EOSINOPHIL NFR BLD: 6 % (ref 0–7)
ERYTHROCYTE [DISTWIDTH] IN BLOOD BY AUTOMATED COUNT: 13.3 % (ref 11.5–14.5)
GLUCOSE BLD STRIP.AUTO-MCNC: 129 MG/DL (ref 65–100)
GLUCOSE SERPL-MCNC: 139 MG/DL (ref 65–100)
HCT VFR BLD AUTO: 34.2 % (ref 36.6–50.3)
HGB BLD-MCNC: 11.6 G/DL (ref 12.1–17)
IMM GRANULOCYTES # BLD: 0 K/UL (ref 0–0.04)
IMM GRANULOCYTES NFR BLD AUTO: 0 % (ref 0–0.5)
INR PPP: 1 (ref 0.9–1.1)
LYMPHOCYTES # BLD: 2 K/UL (ref 0.8–3.5)
LYMPHOCYTES NFR BLD: 35 % (ref 12–49)
MCH RBC QN AUTO: 30.9 PG (ref 26–34)
MCHC RBC AUTO-ENTMCNC: 33.9 G/DL (ref 30–36.5)
MCV RBC AUTO: 91.2 FL (ref 80–99)
MONOCYTES # BLD: 0.5 K/UL (ref 0–1)
MONOCYTES NFR BLD: 10 % (ref 5–13)
NEUTS SEG # BLD: 2.7 K/UL (ref 1.8–8)
NEUTS SEG NFR BLD: 48 % (ref 32–75)
NRBC # BLD: 0 K/UL (ref 0–0.01)
NRBC BLD-RTO: 0 PER 100 WBC
PLATELET # BLD AUTO: 242 K/UL (ref 150–400)
PMV BLD AUTO: 9.1 FL (ref 8.9–12.9)
POTASSIUM SERPL-SCNC: 4.5 MMOL/L (ref 3.5–5.1)
PROTHROMBIN TIME: 10.3 SEC (ref 9–11.1)
RBC # BLD AUTO: 3.75 M/UL (ref 4.1–5.7)
SERVICE CMNT-IMP: ABNORMAL
SODIUM SERPL-SCNC: 137 MMOL/L (ref 136–145)
WBC # BLD AUTO: 5.6 K/UL (ref 4.1–11.1)

## 2018-06-16 PROCEDURE — 85610 PROTHROMBIN TIME: CPT | Performed by: INTERNAL MEDICINE

## 2018-06-16 PROCEDURE — 80048 BASIC METABOLIC PNL TOTAL CA: CPT | Performed by: INTERNAL MEDICINE

## 2018-06-16 PROCEDURE — 36415 COLL VENOUS BLD VENIPUNCTURE: CPT | Performed by: INTERNAL MEDICINE

## 2018-06-16 PROCEDURE — 85025 COMPLETE CBC W/AUTO DIFF WBC: CPT | Performed by: INTERNAL MEDICINE

## 2018-06-16 PROCEDURE — 74011250637 HC RX REV CODE- 250/637: Performed by: INTERNAL MEDICINE

## 2018-06-16 PROCEDURE — 82962 GLUCOSE BLOOD TEST: CPT

## 2018-06-16 RX ORDER — METOPROLOL SUCCINATE 25 MG/1
12.5 TABLET, EXTENDED RELEASE ORAL DAILY
Qty: 15 TAB | Refills: 11 | Status: ON HOLD | OUTPATIENT
Start: 2018-06-16 | End: 2018-08-17

## 2018-06-16 RX ORDER — CLOPIDOGREL BISULFATE 75 MG/1
75 TABLET ORAL DAILY
Qty: 30 TAB | Refills: 11 | Status: SHIPPED | OUTPATIENT
Start: 2018-06-16 | End: 2019-08-28

## 2018-06-16 RX ORDER — GUAIFENESIN 100 MG/5ML
81 LIQUID (ML) ORAL DAILY
Qty: 30 TAB | Refills: 0 | Status: SHIPPED | OUTPATIENT
Start: 2018-06-16 | End: 2018-08-16

## 2018-06-16 RX ADMIN — ASPIRIN 81 MG: 81 TABLET, CHEWABLE ORAL at 09:05

## 2018-06-16 RX ADMIN — METOPROLOL SUCCINATE 12.5 MG: 25 TABLET, EXTENDED RELEASE ORAL at 09:06

## 2018-06-16 RX ADMIN — CLOPIDOGREL BISULFATE 75 MG: 75 TABLET ORAL at 09:05

## 2018-06-16 RX ADMIN — PANTOPRAZOLE SODIUM 40 MG: 40 TABLET, DELAYED RELEASE ORAL at 09:05

## 2018-06-16 RX ADMIN — ACETAMINOPHEN 650 MG: 325 TABLET ORAL at 04:55

## 2018-06-16 RX ADMIN — FINASTERIDE 5 MG: 5 TABLET, FILM COATED ORAL at 09:06

## 2018-06-16 RX ADMIN — Medication 10 ML: at 04:35

## 2018-06-16 NOTE — DISCHARGE INSTRUCTIONS
[address] [facility], [address]   [phone]  [address]    [email or web address]    Patient Discharge Instructions    Graeme Ridley / [id number] : 1934    Admitted 6/15/2018 Discharged: 2018       · It is important that you take the medication exactly as they are prescribed. · Keep your medication in the bottles provided by the pharmacist and keep a list of the medication names, dosages, and times to be taken in your wallet. · Do not take other medications without consulting your doctor. BRING ALL OF YOUR MEDICINES TO YOUR OFFICE VISIT with Jessica Mccormick DO or my nurse practitioner, Rona Dejesus. .    Follow-up with Jessica Mccormick DO or my nurse practitioner, Rona Dejesus in 2 weeks. Cardiac Catheterization  Discharge Instructions    Transradial Catheterization Discharge Instructions (WRIST)    Discharge instructions: Your radial artery in your wrist was used for your cardiac catheterization. This site may be slightly bruised and sore following your procedure. Expect mild tingling or the hand and tenderness at the puncture site for up to 3 days. Excess movement of the wrist used should be avoided for the next 24-48 hours. 1. No lifting over 2 pounds (approximately a ½ gallon of milk) with this arm for 24 hours. 2. Keep the site of the procedure covered with a bandage for 24 hours. 3. You may shower the day after your procedure. Do not take a tub bath or submerge the puncture site in water for 48 hours. 4. No heavy impact activity/lifting > 30 pounds for 1 week. If bleeding of the wrist occurs at home:   If the site on your wrist where you had the catheterization procedure begins to bleed, do not panic. 1. Place 1 or 2 fingers over the puncture site and hold pressure to stop the bleeding. You may be able to feel your pulse as you hold pressure. 2. Lift your fingers after 5 minutes to see if the bleeding has stopped.    3. Once the bleeding has stopped, gently wipe the wrist area clean and cover with a bandage. If the bleeding from your wrist does not stop after 15 minutes, or if there is a large amount of bleeding or spurting, call 911 immediately (do not drive yourself to the hospital). Other concerns: The site may be slightly bruised and sore following your procedure. Should any of the following occur, contact your physician immediately:   1. Any cool or coldness of the arm, discoloration over a large area, ongoing numbness or any abnormal sensations , moderate to severe pain or swelling in the arm. 2. Redness, soreness, swelling, chills or fever, or colored drainage at the procedure site within 3-7 days after your procedure. If you have any further questions or concerns regarding your procedure please call the Cardiac Cath Lab office at [phone]. During regular business hours ask to speak to Dr. Ashley Guzman. During non-business hours the answering service will answer. Ask to speak to the physician on call for [facility].     Transfemoral Catheterization Discharge Instructions Juana Lozano)     Do not drive, operate any machinery, or sign any legal documents for 24 hours after your procedure. You must have someone to drive you home.  You may take a shower 24 hours after your cardiac catheterization. Be sure to get the dressing wet and then remove it; gently wash the area with warm soapy water. Pat dry and leave open to air. To help prevent infections, be sure to keep the cath site clean and dry. No lotions, creams, powders, ointments, etc. in the cath site for approximately 1 week.  Do not take a tub bath, get in a hot tub or swimming pool for approximately 5 days or until the cath site is completely healed.  No strenuous activity or heavy lifting over 10 lbs. for 7 days.  Drink plenty of fluids for 24-48 hours after your cath to flush the contrast dye from your kidneys.  No alcoholic beverages for 24 hours. You may resume your previous diet (low fat, low cholesterol) after your cath.  After your cath, some bruising or discomfort is common during the healing process. Tylenol, 1-2 tablets every 6 hours as needed, is recommended if you experience any discomfort. If you experience any signs or symptoms of infection such as fever, chills, or poorly healing incision, persistent tenderness or swelling in the groin, redness and/or warmth to the touch, numbness, significant tingling or pain at the groin site or affected extremity, rash, drainage from the cath site, or if the leg feels tight or swollen, call your physician right away.  If bleeding at the cath site occurs, take a clean gauze pad and apply direct pressure to the groin just above the puncture site. Call 911 immediately, and continue to apply direct pressure until an ambulance gets to your location.  You may return to work  2  days after your cardiac cath if no groin bleeding. Information obtained by :  I understand that if any problems occur once I am at home I am to contact my physician. I understand and acknowledge receipt of the instructions indicated above. R.N.'s Signature                                                                  Date/Time                                                                                                                                              Patient or Representative Signature                                                          Date/Time      Medardo Markos MCGOWAN, DO             [address] [facility], [address] [facility]    [phone]  [place], [address] [place]    [email or web address]

## 2018-06-16 NOTE — PROGRESS NOTES
Spiritual Care Partner Volunteer visited patient in [place] [address] on 6/16/18. Documented by:  MACARIO Santiago

## 2018-06-18 ENCOUNTER — TELEPHONE (OUTPATIENT)
Dept: FAMILY MEDICINE CLINIC | Age: 83
End: 2018-06-18

## 2018-06-18 ENCOUNTER — PATIENT OUTREACH (OUTPATIENT)
Dept: FAMILY MEDICINE CLINIC | Age: 83
End: 2018-06-18

## 2018-06-18 ENCOUNTER — TELEPHONE (OUTPATIENT)
Dept: CARDIAC REHAB | Age: 83
End: 2018-06-18

## 2018-06-18 NOTE — PROGRESS NOTES
Hospital Discharge Follow-Up      Date/Time:  2018 3:27 PM    Patient was admitted to [facility] on 6/15/18 and discharged on 6/15/18 for cath. The physician discharge summary was not available at the time of outreach. Patient was contacted within 1 business days of discharge. Top Challenges reviewed with the provider   none         Method of communication with provider :none    Inpatient RRAT score: N/A  Was this a readmission? no   Patient stated reason for the readmission: N/A    Nurse Navigator (NN) contacted the patient by telephone to perform post hospital discharge assessment. Verified name and  with patient as identifiers. Provided introduction to self, and explanation of the Nurse Navigator role. Reviewed discharge instructions and red flags with patient who verbalized understanding. Patient given an opportunity to ask questions and does not have any further questions or concerns at this time. The patient agrees to contact the PCP office for questions related to their healthcare. NN provided contact information for future reference. Disease Specific:   N/A    Home Health orders at discharge: [address]: N/A  Date of initial visit: N/A    Durable Medical Equipment ordered/company: None  Durable Medical Equipment received: N/A    Barriers to care? none    Advance Care Planning:   Does patient have an Advance Directive:  reviewed and current     Medication(s):     New Medications at Discharge: Plavix, ASA, Metoprolol  Changed Medications at Discharge: magnesium oxide  Discontinued Medications at Discharge: none    Medication reconciliation was performed with patient, who verbalizes understanding of administration of home medications. There were no barriers to obtaining medications identified at this time. Referral to Pharm D needed: no     Current Outpatient Prescriptions   Medication Sig    aspirin 81 mg chewable tablet Take 1 Tab by mouth daily.  clopidogrel (PLAVIX) 75 mg tab Take 1 Tab by mouth daily.  metoprolol succinate (TOPROL-XL) 25 mg XL tablet Take 0.5 Tabs by mouth daily.  metFORMIN (GLUCOPHAGE) 1,000 mg tablet Take 1 Tab by mouth two (2) times a day.  lansoprazole (PREVACID) 30 mg capsule Take 1 Cap by mouth Daily (before breakfast).  finasteride (PROSCAR) 5 mg tablet TAKE 1 TABLET DAILY FOR SYMPTOMATIC BENIGN PROSTATIC HYPERPLASIA    Telmisartan-amLODIPine 80-5 mg tab TAKE 1 TABLET NIGHTLY FOR BLOOD PRESSURE    ALTAGRACIA PEN NEEDLE 32 gauge x 5/32\" ndle USE 1 NEEDLE UNDER THE SKIN DAILY    exenatide microspheres (BYDUREON) 2 mg/0.65 mL pnij 2 mg by SubCUTAneous route every seven (7) days. For diabetes    insulin glargine (LANTUS SOLOSTAR) 100 unit/mL (3 mL) inpn 55 Units by SubCUTAneous route daily.  COUMADIN 4 mg tablet TAKE 2 TABLETS DAILY    cholecalciferol, VITAMIN D3, (VITAMIN D3) 5,000 unit tab tablet Take  by mouth daily. Nature Made    cyanocobalamin (VITAMIN B-12) 1,000 mcg tablet Take 1,000 mcg by mouth daily. Nature Made    magnesium oxide (MAG-OX) 400 mg tablet Take 1 Tab by mouth four (4) times daily. For low magnesium (Patient taking differently: Take 1,600 mg by mouth daily. For low magnesium)    Insulin Needles, Disposable, (BD INSULIN PEN NEEDLE UF MINI) 31 x 3/16 \" ndle 1 each by SubCUTAneous route daily.  cyclobenzaprine (FLEXERIL) 10 mg tablet Take  by mouth three (3) times daily as needed for Muscle Spasm(s). No current facility-administered medications for this visit. There are no discontinued medications. PCP/Specialist follow up: will need follow up with Dr. Wendie Holt in 2 weeks. Future Appointments  Date Time Provider Shell Barber   6/21/2018 11:10 AM Walter Burt MD [address]   7/12/2018 9:50 AM Walter Burt MD [address]   8/22/2018 9:50 AM Carlos Smith MD [address] Prevent complications post hospitalization. 6/18/18 Patient reports some bruising at site. Denies fever, bleeding, S&S of infection. Will monitor and report at next week outreach.  TONYA

## 2018-06-18 NOTE — TELEPHONE ENCOUNTER
Patient scheduled to see Dr. Jocelyn Sparrow on 6/21/18 at 0 for HAILEE VAZQUEZ appointment. See Patient outreach for further documentation.

## 2018-06-18 NOTE — TELEPHONE ENCOUNTER
Pls call patient wife - he had a catherization  (Dr Amie Medrano) on 6/15/18 and was told to f/u in 1 wk w/PCP     Best number to reach her is [phone]

## 2018-06-18 NOTE — TELEPHONE ENCOUNTER
Cardiopulmonary Rehab Nursing Entry:    Phone call to check on status of enrollment in out-patient Cardiac Rehab Program.  Spoke with pts wife who reports that they would prefer to see cardiologist to discuss program.  Follow-up appt on 6/29. Will call pt back to check on interest.  Wife does report that pt has a treadmill at home to walk as well.

## 2018-06-21 ENCOUNTER — HOSPITAL ENCOUNTER (OUTPATIENT)
Dept: GENERAL RADIOLOGY | Age: 83
Discharge: HOME | End: 2018-06-21
Payer: MEDICARE

## 2018-06-21 ENCOUNTER — OFFICE VISIT (OUTPATIENT)
Dept: FAMILY MEDICINE CLINIC | Age: 83
End: 2018-06-21

## 2018-06-21 VITALS
WEIGHT: 278.4 LBS | SYSTOLIC BLOOD PRESSURE: 96 MMHG | OXYGEN SATURATION: 97 % | TEMPERATURE: 96.8 F | HEIGHT: 71 IN | DIASTOLIC BLOOD PRESSURE: 66 MMHG | RESPIRATION RATE: 20 BRPM | BODY MASS INDEX: 38.98 KG/M2 | HEART RATE: 63 BPM

## 2018-06-21 DIAGNOSIS — I25.83 CORONARY ARTERY DISEASE DUE TO LIPID RICH PLAQUE: ICD-10-CM

## 2018-06-21 DIAGNOSIS — M25.512 CHRONIC LEFT SHOULDER PAIN: ICD-10-CM

## 2018-06-21 DIAGNOSIS — G89.29 CHRONIC PAIN OF LEFT ELBOW: ICD-10-CM

## 2018-06-21 DIAGNOSIS — Z09 HOSPITAL DISCHARGE FOLLOW-UP: Primary | ICD-10-CM

## 2018-06-21 DIAGNOSIS — Z86.718 HISTORY OF VENOUS THROMBOEMBOLISM: ICD-10-CM

## 2018-06-21 DIAGNOSIS — M25.522 CHRONIC PAIN OF LEFT ELBOW: ICD-10-CM

## 2018-06-21 DIAGNOSIS — R05.3 CHRONIC COUGH: ICD-10-CM

## 2018-06-21 DIAGNOSIS — Z91.81 HISTORY OF FALLING: ICD-10-CM

## 2018-06-21 DIAGNOSIS — R53.1 WEAKNESS GENERALIZED: ICD-10-CM

## 2018-06-21 DIAGNOSIS — D68.59 HYPERCOAGULABLE STATE (HCC): ICD-10-CM

## 2018-06-21 DIAGNOSIS — G89.29 CHRONIC LEFT SHOULDER PAIN: ICD-10-CM

## 2018-06-21 DIAGNOSIS — Z79.01 ENCOUNTER FOR MONITORING COUMADIN THERAPY: ICD-10-CM

## 2018-06-21 DIAGNOSIS — Z95.820 S/P ANGIOPLASTY WITH STENT: ICD-10-CM

## 2018-06-21 DIAGNOSIS — Z51.81 ENCOUNTER FOR MONITORING COUMADIN THERAPY: ICD-10-CM

## 2018-06-21 DIAGNOSIS — I25.10 CORONARY ARTERY DISEASE DUE TO LIPID RICH PLAQUE: ICD-10-CM

## 2018-06-21 LAB
INR BLD: 1.5
PT POC: NORMAL SECONDS
VALID INTERNAL CONTROL?: YES

## 2018-06-21 PROCEDURE — 73080 X-RAY EXAM OF ELBOW: CPT

## 2018-06-21 RX ORDER — DICLOFENAC SODIUM 10 MG/G
GEL TOPICAL 4 TIMES DAILY
Qty: 100 G | Refills: 0 | Status: SHIPPED | OUTPATIENT
Start: 2018-06-21 | End: 2018-08-15 | Stop reason: SDUPTHER

## 2018-06-21 RX ORDER — CETIRIZINE HCL 10 MG
10 TABLET ORAL
Qty: 30 TAB | Refills: 0
Start: 2018-06-21 | End: 2018-08-16

## 2018-06-21 NOTE — MR AVS SNAPSHOT
[address] [facility], [place] [address] [facility] 
[phone] Patient: Alicia Billings MRN: [id number] NTV:[birth year] Visit Information Date & Time Provider Department Dept. Phone Encounter #  
 6/21/2018 11:10 AM Susan Chacon MD [facility] at [address] [id number] Follow-up Instructions Return in about 6 weeks (around 8/2/2018), or if symptoms worsen or fail to improve, for Regular Follow up. Your Appointments 7/12/2018  9:50 AM  
ROUTINE CARE with Susan Chacon MD  
[facility] at [facility]) Appt Note: 3m fu; moved up 1 week [address] [facility] [address] [id number]  
[facility]  
  
    
 8/22/2018  9:50 AM  
Follow Up with Leoncio Mccollum MD  
[facility]) Appt Note: 6 month f/u  Diabetes; R/S  [id number]  [facility] [address] [id number] [address] Upcoming Health Maintenance Date Due  
 FOOT EXAM Q1 3/15/2018 Pneumococcal 65+ Low/Medium Risk (2 of 2 - PPSV23) 5/18/2018 Influenza Age 5 to Adult 8/1/2018 HEMOGLOBIN A1C Q6M 10/19/2018 MICROALBUMIN Q1 11/15/2018 EYE EXAM RETINAL OR DILATED Q1 11/30/2018 MEDICARE YEARLY EXAM 4/19/2019 LIPID PANEL Q1 4/19/2019 GLAUCOMA SCREENING Q2Y 11/30/2019 DTaP/Tdap/Td series (2 - Td) 5/18/2027 Allergies as of 6/21/2018  Review Complete On: 6/21/2018 By: Susan Chacon MD  
  
 Severity Noted Reaction Type Reactions Statins-hmg-coa Reductase Inhibitors  03/23/2016   Side Effect Myalgia Tried several statins. Current Immunizations  Never Reviewed Name Date Influenza High Dose Vaccine PF 10/5/2017 Not reviewed this visit You Were Diagnosed With   
  
 Codes Comments Hospital discharge follow-up    -  Primary ICD-10-CM: [facility] ICD-9-CM: V67.59 Hypercoagulable state ([address]     ICD-10-CM: Z83.64 
ICD-9-CM: 289.81 History of venous thromboembolism     ICD-10-CM: Z86.718 ICD-9-CM: V12.51 Encounter for monitoring coumadin therapy     ICD-10-CM: Z51.81, Z79.01 
ICD-9-CM: V58.83, V58.61 Coronary artery disease due to lipid rich plaque     ICD-10-CM: I25.10, I25.83 ICD-9-CM: 414.00, 414.3 S/P angioplasty with stent     ICD-10-CM: Z95.9 ICD-9-CM: V45.89 Chronic cough     ICD-10-CM: R05 ICD-9-CM: 786.2 Weakness generalized     ICD-10-CM: R53.1 ICD-9-CM: 780.79 History of falling     ICD-10-CM: Z91.81 
ICD-9-CM: V15.88 Chronic left shoulder pain     ICD-10-CM: M25.512, G89.29 ICD-9-CM: 719.41, 338.29 Chronic pain of left elbow     ICD-10-CM: M25.522, G89.29 ICD-9-CM: 719.42, 338.29 Vitals BP Pulse Temp Resp Height(growth percentile) Weight(growth percentile) 96/66 (BP 1 Location: Right arm, BP Patient Position: Sitting) 63 96.8 °F (36 °C) (Oral) 20 5' 11\" (1.803 m) 278 lb 6.4 oz (126.3 kg) SpO2 BMI Smoking Status 97% 38.83 kg/m2 Former Smoker Vitals History BMI and BSA Data Body Mass Index Body Surface Area  
 38.83 kg/m 2 2.52 m 2 Preferred Pharmacy Pharmacy Name Phone [id number] [phone] Your Updated Medication List  
  
   
This list is accurate as of 6/21/18 12:29 PM.  Always use your most recent med list.  
  
  
  
  
 aspirin 81 mg chewable tablet Take 1 Tab by mouth daily. cetirizine 10 mg tablet Commonly known as:  ZYRTEC Take 1 Tab by mouth nightly. Indications: SEASONAL ALLERGIC RHINITIS  
  
 clopidogrel 75 mg Tab Commonly known as:  PLAVIX Take 1 Tab by mouth daily. COUMADIN 4 mg tablet Generic drug:  warfarin TAKE 2 TABLETS DAILY diclofenac 1 % Gel Commonly known as:  VOLTAREN Apply  to affected area four (4) times daily. exenatide microspheres 2 mg/0.65 mL Pnij Commonly known as:  BYDUREON  
2 mg by SubCUTAneous route every seven (7) days. For diabetes  
  
 finasteride 5 mg tablet Commonly known as:  PROSCAR  
TAKE 1 TABLET DAILY FOR SYMPTOMATIC BENIGN PROSTATIC HYPERPLASIA FLEXERIL 10 mg tablet Generic drug:  cyclobenzaprine Take  by mouth three (3) times daily as needed for Muscle Spasm(s). insulin glargine 100 unit/mL (3 mL) Inpn Commonly known as:  LANTUS SOLOSTAR U-100 INSULIN  
55 Units by SubCUTAneous route daily. Insulin Needles (Disposable) 31 gauge x 3/16\" Ndle Commonly known as:  BD ULTRA-FINE MINI PEN NEEDLE  
1 each by SubCUTAneous route daily. Linda Pen Needle 32 gauge x 5/32\" Ndle Generic drug:  Insulin Needles (Disposable) USE 1 NEEDLE UNDER THE SKIN DAILY  
  
 lansoprazole 30 mg capsule Commonly known as:  PREVACID Take 1 Cap by mouth Daily (before breakfast). magnesium oxide 400 mg tablet Commonly known as:  MAG-OX Take 1 Tab by mouth four (4) times daily. For low magnesium  
  
 metFORMIN 1,000 mg tablet Commonly known as:  GLUCOPHAGE Take 1 Tab by mouth two (2) times a day. metoprolol succinate 25 mg XL tablet Commonly known as:  TOPROL-XL Take 0.5 Tabs by mouth daily. Telmisartan-amLODIPine 80-5 mg Tab TAKE 1 TABLET NIGHTLY FOR BLOOD PRESSURE  
  
 VITAMIN B-12 1,000 mcg tablet Generic drug:  cyanocobalamin Take 1,000 mcg by mouth daily. Nature Made VITAMIN D3 5,000 unit Tab tablet Generic drug:  cholecalciferol (VITAMIN D3) Take  by mouth daily. Nature Made Prescriptions Sent to Pharmacy Refills  
 diclofenac (VOLTAREN) 1 % gel 0 Sig: Apply  to affected area four (4) times daily.   
 Class: Normal  
 Pharmacy: [id number] Ph #: [phone] Route: Topical  
  
June 2018 Details Sun Mon Tue Wed Thu Fri Sat  
       1  
  
  
  
   2  
  
  
  
  
  3  
  
  
  
   4  
  
  
  
   5  
  
  
  
   6  
  
  
  
   7  
  
  
  
   8  
  
  
  
   9  
  
  
  
  
  10  
  
  
  
   11  
  
  
  
   12  
  
  
  
   13  
  
  
  
   14  
  
  
  
   15  
  
  
  
   16  
  
  
  
  
  17  
  
  
  
   18  
  
  
  
   19  
  
  
  
   20  
  
  
  
   21  
  
8 mg See details 22  
  
8 mg  
  
   23  
  
8 mg  
  
  
  24  
  
8 mg  
  
   25  
  
8 mg  
  
   26  
  
8 mg  
  
   27  
  
8 mg  
  
   28  
  
8 mg  
  
   29  
  
  
  
   30  
  
  
  
  
 Date Details 06/21 This INR check INR: 1.5 Date of next INR:  6/28/2018 How to take your warfarin dose To take:  8 mg Take two of the 4 mg tablets. We Performed the Following AMB POC PT/INR [79509 CPT(R)] REFERRAL TO PHYSICAL THERAPY [KWD02 Custom] Comments:  
 Global strengthening REFERRAL TO PULMONARY DISEASE [WPX83 Custom] Comments:  
 Chronic cough, abn CT scan Follow-up Instructions Return in about 6 weeks (around 8/2/2018), or if symptoms worsen or fail to improve, for Regular Follow up. To-Do List   
 06/21/2018 Imaging:  XR ELBOW LT MIN 3 V Referral Information Referral ID Referred By Referred To  
  
 [id number] Minnie Gonzalez [facility] PHYSICAL THERAPY   
   [address], [address] Phone: [phone] Visits Status Start Date End Date 1 New Request 6/21/18 6/21/19 If your referral has a status of pending review or denied, additional information will be sent to support the outcome of this decision. Referral ID Referred By Referred To  
 [id number] Minnie Gonzalez [facility] [address] [facility] [address] [place], [address] Visits Status Start Date End Date 1 New Request 6/21/18 6/21/19 If your referral has a status of pending review or denied, additional information will be sent to support the outcome of this decision. Introducing [facility]! [facility] introduces [employer] patient portal. Now you can access parts of your medical record, email your doctor's office, and request medication refills online. 1. In your internet browser, go to [email or web address] 2. Click on the First Time User? Click Here link in the Sign In box. You will see the New Member Sign Up page. 3. Enter your [employer] Access Code exactly as it appears below. You will not need to use this code after youve completed the sign-up process. If you do not sign up before the expiration date, you must request a new code. · [employer] Access Code: [email or web address] Expires: 7/17/2018 12:59 PM 
 
4. Enter the last four digits of your Social Security Number (xxxx) and Date of Birth (mm/dd/yyyy) as indicated and click Submit. You will be taken to the next sign-up page. 5. Create a [employer] ID. This will be your [employer] login ID and cannot be changed, so think of one that is secure and easy to remember. 6. Create a [employer] password. You can change your password at any time. 7. Enter your Password Reset Question and Answer. This can be used at a later time if you forget your password. 8. Enter your e-mail address. You will receive e-mail notification when new information is available in [address] 9. Click Sign Up. You can now view and download portions of your medical record. 10. Click the Download Summary menu link to download a portable copy of your medical information. If you have questions, please visit the Frequently Asked Questions section of the [employer] website. Remember, [employer] is NOT to be used for urgent needs. For medical emergencies, dial 911. Now available from your iPhone and Android! Please provide this summary of care documentation to your next provider. Your primary care clinician is listed as Andra Kenny. If you have any questions after today's visit, please call [phone].

## 2018-06-21 NOTE — PROGRESS NOTES
Subjective:     Chief Complaint   Patient presents with   [facility] Follow Up     Discharged 6/16/18      He  is a 80 y.o. male who presents for evaluation of:  Hospital d/c f/u - doing well after having stent for LAD blockage. 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 See anti-coag tab for history and dosing with plan. Ct to have pain in left arm. Started in 9/2017 after having a fall on outstretched hand and then on left shoulder. Pain never improved. Got X-rays in 4/2018 of shoulder and these were nml with mild DJD. Pain is now constant in elbow. Having weakness and tremulous. Nothing helps with this. ROS per HPI    Past Medical History:   Diagnosis Date    Arthritis     Back injury 1994    CAD (coronary artery disease)     Stent replacement    Diabetes ([address]     GERD (gastroesophageal reflux disease)     Hypertension     Muscle cramps     PUD (peptic ulcer disease)     Reflux     Thromboembolus ([address]     L BLE     Past Surgical History:   Procedure Laterality Date    HX ORTHOPAEDIC Left     Crushed/left index finger amputee    HX ORTHOPAEDIC      heal spur removed left foot    HX OTHER SURGICAL      hand surgery, heal spur L    UPPER GI ENDOSCOPY,BIOPSY  5/16/2018         UPPER GI ENDOSCOPY,DILATN W GUIDE  5/16/2018          Current Outpatient Prescriptions on File Prior to Visit   Medication Sig Dispense Refill    aspirin 81 mg chewable tablet Take 1 Tab by mouth daily. 30 Tab 0    clopidogrel (PLAVIX) 75 mg tab Take 1 Tab by mouth daily. 30 Tab 11    metoprolol succinate (TOPROL-XL) 25 mg XL tablet Take 0.5 Tabs by mouth daily. 15 Tab 11    metFORMIN (GLUCOPHAGE) 1,000 mg tablet Take 1 Tab by mouth two (2) times a day. 180 Tab 1    lansoprazole (PREVACID) 30 mg capsule Take 1 Cap by mouth Daily (before breakfast).  90 Cap 1    finasteride (PROSCAR) 5 mg tablet TAKE 1 TABLET DAILY FOR SYMPTOMATIC BENIGN PROSTATIC HYPERPLASIA 30 Tab 4    Telmisartan-amLODIPine 80-5 mg tab TAKE 1 TABLET NIGHTLY FOR BLOOD PRESSURE 90 Tab 1    ALTAGRACIA PEN NEEDLE 32 gauge x 5/32\" ndle USE 1 NEEDLE UNDER THE SKIN DAILY 90 Pen Needle 3    exenatide microspheres (BYDUREON) 2 mg/0.65 mL pnij 2 mg by SubCUTAneous route every seven (7) days. For diabetes 8 mL 3    insulin glargine (LANTUS SOLOSTAR) 100 unit/mL (3 mL) inpn 55 Units by SubCUTAneous route daily. 15 Pen 3    COUMADIN 4 mg tablet TAKE 2 TABLETS DAILY 180 Tab 2    cholecalciferol, VITAMIN D3, (VITAMIN D3) 5,000 unit tab tablet Take  by mouth daily. Nature Made      cyanocobalamin (VITAMIN B-12) 1,000 mcg tablet Take 1,000 mcg by mouth daily. Nature Made      magnesium oxide (MAG-OX) 400 mg tablet Take 1 Tab by mouth four (4) times daily. For low magnesium (Patient taking differently: Take 1,600 mg by mouth daily. For low magnesium) 360 Tab 3    Insulin Needles, Disposable, (BD INSULIN PEN NEEDLE UF MINI) 31 x 3/16 \" ndle 1 each by SubCUTAneous route daily. 100 each 3    cyclobenzaprine (FLEXERIL) 10 mg tablet Take  by mouth three (3) times daily as needed for Muscle Spasm(s). No current facility-administered medications on file prior to visit. Objective:     Vitals:    06/21/18 1136   BP: 96/66   Pulse: 63   Resp: 20   Temp: 96.8 °F (36 °C)   TempSrc: Oral   SpO2: 97%   Weight: 278 lb 6.4 oz (126.3 kg)   Height: 5' 11\" (1.803 m)     Physical Examination:  General appearance - alert, well appearing, and in no distress  Eyes -sclera anicteric  Neck - supple, no significant adenopathy, no thyromegaly  Chest - clear to auscultation, no wheezes, rales or rhonchi, symmetric air entry  Heart - normal rate, regular rhythm, normal S1, S2, no murmurs, rubs, clicks or gallops  Neurological - alert, oriented, no focal findings or movement disorder noted  Extr - trace edema    Assessment/ Plan:   Diagnoses and all orders for this visit:    1. Hospital discharge follow-up    2. Hypercoagulable state ([address]  3. History of venous thromboembolism  4. Encounter for monitoring coumadin therapy  - INR improving after being off coumadin, ct current dose and recheck in 1 week  -     AMB POC PT/INR    5. Coronary artery disease due to lipid rich plaque  6. S/P angioplasty with stent  - doing well after stenting    7. Chronic cough -   -     [facility]  -     cetirizine (ZYRTEC) 10 mg tablet; Take 1 Tab by mouth nightly. Indications: SEASONAL ALLERGIC RHINITIS    8. Weakness generalized  9. History of falling  - will get pt to do PT for global strengthening  -     [email or web address]    10. Chronic left shoulder pain  11. Chronic pain of left elbow  - getting x-rays and will try voltaren gel. For PT. If not improving, will send to Ortho  -     diclofenac (VOLTAREN) 1 % gel; Apply  to affected area four (4) times daily. -     XR ELBOW LT MIN 3 V; Future    I have discussed the diagnosis with the patient and the intended plan as seen in the above orders. The patient has received an after-visit summary and questions were answered concerning future plans. I have discussed medication side effects and warnings with the patient as well. The patient verbalizes understanding and agreement with the plan. Follow-up Disposition:  Return in about 6 weeks (around 8/2/2018), or if symptoms worsen or fail to improve, for Regular Follow up.

## 2018-06-27 ENCOUNTER — PATIENT OUTREACH (OUTPATIENT)
Dept: FAMILY MEDICINE CLINIC | Age: 83
End: 2018-06-27

## 2018-06-27 NOTE — PROGRESS NOTES
Goals Addressed      Prevent complications post hospitalization. 6/18/18 Patient reports some bruising at site. Denies fever, bleeding, S&S of infection. Will monitor and report at next week outreach. TONYA    6/27/18 Patient reports all bruising is gone. Denies fever, bleeding, S&S of infection. Will monitor and report at next week outreach. Patient wants to participate in cardiac rehab. This writer sent message to Reno Mora to facilitate scheduling for patient. Will follow up at next week outreach call.  TONYA

## 2018-06-28 ENCOUNTER — TELEPHONE (OUTPATIENT)
Dept: CARDIAC REHAB | Age: 83
End: 2018-06-28

## 2018-06-28 NOTE — TELEPHONE ENCOUNTER
Cardiopulmonary Rehab Nursing Entry:    Message recevied from Dr. Aravind Schmid office to schedule pt for out-patient Cardiac Rehab. Pt initial appt scheduled for 8/16/2018 @ 2:30pm. Packet mailed to home address.

## 2018-07-06 ENCOUNTER — PATIENT OUTREACH (OUTPATIENT)
Dept: FAMILY MEDICINE CLINIC | Age: 83
End: 2018-07-06

## 2018-07-06 NOTE — PROGRESS NOTES
Goals      Prevent complications post hospitalization. 6/18/18 Patient reports some bruising at site. Denies fever, bleeding, S&S of infection. Will monitor and report at next week outreach. [facility]    6/27/18 Patient reports all bruising is gone. Denies fever, bleeding, S&S of infection. Will monitor and report at next week outreach. Patient wants to participate in cardiac rehab. This writer sent message to Malathi Crisostomo to facilitate scheduling for patient. Will follow up at next week outreach call. TONYA    7/6/18 Wife denies S&S of infection. Cardiac rehab appointment is scheduled for 8/16/18.  TONYA

## 2018-07-16 ENCOUNTER — PATIENT OUTREACH (OUTPATIENT)
Dept: FAMILY MEDICINE CLINIC | Age: 83
End: 2018-07-16

## 2018-07-16 NOTE — PROGRESS NOTES
Goals      Prevent complications post hospitalization. 6/18/18 Patient reports some bruising at site. Denies fever, bleeding, S&S of infection. Will monitor and report at next week outreach. [facility]    6/27/18 Patient reports all bruising is gone. Denies fever, bleeding, S&S of infection. Will monitor and report at next week outreach. Patient wants to participate in cardiac rehab. This writer sent message to Benigno Delgado to facilitate scheduling for patient. Will follow up at next week outreach call. [facility]    7/6/18 Wife denies S&S of infection. Cardiac rehab appointment is scheduled for 8/16/18. [facility]    7-16-18;  Spoke with Mr. Thomas Tena today (verified with 2 identifiers). Mr. Thomas Tena denies any fevers, bleeding or s/s of infection. He states he still plans to f/u with cardiac rehab appt that is scheduled for 8-16-18.   I reviewed that he has an appt with PCP on 8-15-18 at 8:30 am.  JOHN

## 2018-07-30 ENCOUNTER — TELEPHONE (OUTPATIENT)
Dept: FAMILY MEDICINE CLINIC | Age: 83
End: 2018-07-30

## 2018-07-30 NOTE — TELEPHONE ENCOUNTER
Ms. Powell Player returning call to this office     States her mother in law answered and they dont know who was calling     Best number to reach her is [phone]

## 2018-08-15 ENCOUNTER — OFFICE VISIT (OUTPATIENT)
Dept: FAMILY MEDICINE CLINIC | Age: 83
End: 2018-08-15

## 2018-08-15 VITALS
OXYGEN SATURATION: 97 % | DIASTOLIC BLOOD PRESSURE: 71 MMHG | WEIGHT: 283.8 LBS | SYSTOLIC BLOOD PRESSURE: 139 MMHG | RESPIRATION RATE: 18 BRPM | HEART RATE: 72 BPM | TEMPERATURE: 96.8 F | BODY MASS INDEX: 39.73 KG/M2 | HEIGHT: 71 IN

## 2018-08-15 DIAGNOSIS — E11.9 TYPE 2 DIABETES MELLITUS WITHOUT COMPLICATION, WITH LONG-TERM CURRENT USE OF INSULIN (HCC): ICD-10-CM

## 2018-08-15 DIAGNOSIS — Z95.820 S/P ANGIOPLASTY WITH STENT: ICD-10-CM

## 2018-08-15 DIAGNOSIS — Z51.81 ENCOUNTER FOR MONITORING COUMADIN THERAPY: ICD-10-CM

## 2018-08-15 DIAGNOSIS — Z79.4 TYPE 2 DIABETES MELLITUS WITHOUT COMPLICATION, WITH LONG-TERM CURRENT USE OF INSULIN (HCC): ICD-10-CM

## 2018-08-15 DIAGNOSIS — I25.10 CORONARY ARTERY DISEASE DUE TO LIPID RICH PLAQUE: ICD-10-CM

## 2018-08-15 DIAGNOSIS — E78.5 HYPERLIPIDEMIA LDL GOAL <100: ICD-10-CM

## 2018-08-15 DIAGNOSIS — I10 ESSENTIAL HYPERTENSION: ICD-10-CM

## 2018-08-15 DIAGNOSIS — N40.0 BENIGN PROSTATIC HYPERPLASIA, UNSPECIFIED WHETHER LOWER URINARY TRACT SYMPTOMS PRESENT: ICD-10-CM

## 2018-08-15 DIAGNOSIS — M25.522 CHRONIC PAIN OF LEFT ELBOW: ICD-10-CM

## 2018-08-15 DIAGNOSIS — Z86.718 HISTORY OF VENOUS THROMBOEMBOLISM: ICD-10-CM

## 2018-08-15 DIAGNOSIS — G89.29 CHRONIC PAIN OF LEFT ELBOW: ICD-10-CM

## 2018-08-15 DIAGNOSIS — E66.01 OBESITY, MORBID (HCC): ICD-10-CM

## 2018-08-15 DIAGNOSIS — I25.83 CORONARY ARTERY DISEASE DUE TO LIPID RICH PLAQUE: ICD-10-CM

## 2018-08-15 DIAGNOSIS — Z79.01 ENCOUNTER FOR MONITORING COUMADIN THERAPY: ICD-10-CM

## 2018-08-15 DIAGNOSIS — D68.59 HYPERCOAGULABLE STATE (HCC): Primary | ICD-10-CM

## 2018-08-15 RX ORDER — DICLOFENAC SODIUM 10 MG/G
GEL TOPICAL 4 TIMES DAILY
Qty: 100 G | Refills: 5 | Status: SHIPPED | OUTPATIENT
Start: 2018-08-15

## 2018-08-15 RX ORDER — TOPIRAMATE 25 MG/1
25 TABLET ORAL 2 TIMES DAILY
Qty: 60 TAB | Refills: 2 | Status: ON HOLD | OUTPATIENT
Start: 2018-08-15 | End: 2018-08-16

## 2018-08-15 RX ORDER — TOPIRAMATE 25 MG/1
25 TABLET ORAL 2 TIMES DAILY
Qty: 60 TAB | Refills: 2 | Status: SHIPPED | OUTPATIENT
Start: 2018-08-15 | End: 2018-08-15 | Stop reason: SDUPTHER

## 2018-08-15 RX ORDER — FINASTERIDE 5 MG/1
TABLET, FILM COATED ORAL
Qty: 90 TAB | Refills: 1 | Status: SHIPPED | OUTPATIENT
Start: 2018-08-15 | End: 2019-02-11 | Stop reason: SDUPTHER

## 2018-08-15 RX ORDER — DICLOFENAC SODIUM 10 MG/G
GEL TOPICAL 4 TIMES DAILY
Qty: 100 G | Refills: 5 | Status: SHIPPED | OUTPATIENT
Start: 2018-08-15 | End: 2018-08-15 | Stop reason: SDUPTHER

## 2018-08-15 NOTE — PROGRESS NOTES
Subjective:     Chief Complaint   Patient presents with    Diabetes     3 month follow-up      He  is a 80 y.o. male who presents for evaluation of:  Doing well today with no concerns. 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 See anti-coag tab for history and dosing with plan. Ct to have pain in left arm. Started in 9/2017 after having a fall on outstretched hand and then on left shoulder. Pain never improved. Got X-rays in 4/2018 of shoulder and these were nml with mild DJD. Pain is now constant in elbow. Having weakness and tremulous. Nothing helps with this. Pmhx sig for DM2 (seeing Dr. 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 Diabetes has been well controlled with Lantus, Metformin, and Bydureon. Will have labs drawn with Dr. Nancie High next week    ROS  Gen - no fever/chills  Resp - to see Dr. Ortega Members soon for chronic cough  CV - no chest pain or DIAZ  Rest per HPI    Past Medical History:   Diagnosis Date    Arthritis     Back injury 1994    CAD (coronary artery disease)     Stent replacement    Diabetes ([address]     GERD (gastroesophageal reflux disease)     Hypertension     Muscle cramps     PUD (peptic ulcer disease)     Reflux     Thromboembolus ([address]     L BLE     Past Surgical History:   Procedure Laterality Date    HX ORTHOPAEDIC Left     Crushed/left index finger amputee    HX ORTHOPAEDIC      heal spur removed left foot    HX OTHER SURGICAL      hand surgery, heal spur L    UPPER GI ENDOSCOPY,BIOPSY  5/16/2018         UPPER GI ENDOSCOPY,DILATN W GUIDE  5/16/2018          Current Outpatient Prescriptions on File Prior to Visit   Medication Sig Dispense Refill    cetirizine (ZYRTEC) 10 mg tablet Take 1 Tab by mouth nightly. Indications: SEASONAL ALLERGIC RHINITIS 30 Tab 0    clopidogrel (PLAVIX) 75 mg tab Take 1 Tab by mouth daily.  30 Tab 11    metoprolol succinate (TOPROL-XL) 25 mg XL tablet Take 0.5 Tabs by mouth daily. 15 Tab 11    metFORMIN (GLUCOPHAGE) 1,000 mg tablet Take 1 Tab by mouth two (2) times a day. 180 Tab 1    lansoprazole (PREVACID) 30 mg capsule Take 1 Cap by mouth Daily (before breakfast). 90 Cap 1    Telmisartan-amLODIPine 80-5 mg tab TAKE 1 TABLET NIGHTLY FOR BLOOD PRESSURE 90 Tab 1    ALTAGRACIA PEN NEEDLE 32 gauge x 5/32\" ndle USE 1 NEEDLE UNDER THE SKIN DAILY 90 Pen Needle 3    exenatide microspheres (BYDUREON) 2 mg/0.65 mL pnij 2 mg by SubCUTAneous route every seven (7) days. For diabetes 8 mL 3    insulin glargine (LANTUS SOLOSTAR) 100 unit/mL (3 mL) inpn 55 Units by SubCUTAneous route daily. 15 Pen 3    COUMADIN 4 mg tablet TAKE 2 TABLETS DAILY 180 Tab 2    cholecalciferol, VITAMIN D3, (VITAMIN D3) 5,000 unit tab tablet Take  by mouth daily. Nature Made      cyanocobalamin (VITAMIN B-12) 1,000 mcg tablet Take 1,000 mcg by mouth daily. Nature Made      magnesium oxide (MAG-OX) 400 mg tablet Take 1 Tab by mouth four (4) times daily. For low magnesium (Patient taking differently: Take 1,600 mg by mouth daily. For low magnesium) 360 Tab 3    cyclobenzaprine (FLEXERIL) 10 mg tablet Take  by mouth three (3) times daily as needed for Muscle Spasm(s).  aspirin 81 mg chewable tablet Take 1 Tab by mouth daily. 30 Tab 0     No current facility-administered medications on file prior to visit.          Objective:     Vitals:    08/15/18 0852 08/15/18 0903   BP: 152/75 139/71   Pulse: 72    Resp: 18    Temp: 96.8 °F (36 °C)    TempSrc: Oral    SpO2: 97%    Weight: 283 lb 12.8 oz (128.7 kg)    Height: 5' 11\" (1.803 m)      Physical Examination:  General appearance - alert, well appearing, and in no distress  Eyes -sclera anicteric  Neck - supple, no significant adenopathy, no thyromegaly  Chest - clear to auscultation, no wheezes, rales or rhonchi, symmetric air entry  Heart - normal rate, regular rhythm, normal S1, S2, no murmurs, rubs, clicks or gallops  Neurological - alert, oriented, no focal findings or movement disorder noted  Extr - trace edema  Diabetic foot exam:   Left Foot:   Visual Exam: normal    Pulse DP: 1+ (weak)   Filament test: absent   Vibratory sensation: absent      Right Foot:   Visual Exam: normal    Pulse DP: 1+ (weak)   Filament test: absent sensation    Vibratory sensation: absent    Assessment/ Plan:   Diagnoses and all orders for this visit:    1. Hypercoagulable state ([facility]  2. History of venous thromboembolism  3. Encounter for monitoring coumadin therapy  - INR stable, checked at home with machine    4. Coronary artery disease due to lipid rich plaque  5. Hyperlipidemia LDL goal <100  6. S/P angioplasty with stent  - stable, followed by cardiology    7. Type 2 diabetes mellitus without complication, with long-term current use of insulin (HCC) - controlled, followed by Dr. Kaylee Frazier  -     Mitch Medrano    8. Essential hypertension - stable    9. Chronic pain of left elbow and hand - improving with voltaren  -     diclofenac (VOLTAREN) 1 % gel; Apply  to affected area four (4) times daily. 10. Benign prostatic hyperplasia, unspecified whether lower urinary tract symptoms present - stable, ct Proscar  -     finasteride (PROSCAR) 5 mg tablet; TAKE 1 TABLET DAILY FOR SYMPTOMATIC BENIGN PROSTATIC HYPERPLASIA    11. Obesity, morbid ([facility]  12. BMI 39.0-39.9,adult  -Patient has recently been struggling with desserts. Discussed making major changes to his diet and will try low-dose Topamax to help with decreasing appetite (avoiding phentermine given her history, already taking GLP-1, and higher cost of other weight loss meds)  Discussed the patient's BMI. The BMI follow up plan is as follows:   dietary management education, guidance, and counseling  encourage exercise  monitor weight  prescribed dietary intake  -     topiramate (TOPAMAX) 25 mg tablet; Take 1 Tab by mouth two (2) times a day.     I have discussed the diagnosis with the patient and the intended plan as seen in the above orders. The patient has received an after-visit summary and questions were answered concerning future plans. I have discussed medication side effects and warnings with the patient as well. The patient verbalizes understanding and agreement with the plan. Follow-up Disposition:  Return in about 3 months (around 11/15/2018) for Regular Follow up.

## 2018-08-15 NOTE — MR AVS SNAPSHOT
[address] [facility] [address] [facility] 
[phone] Patient: Claire Christy MRN: [id number] [facility]:[birth year] Visit Information Date & Time Provider Department Dept. Phone Encounter #  
 8/15/2018  8:30 AM Ray Morales MD [facility] at [address] [id number] Follow-up Instructions Return in about 3 months (around 11/15/2018) for Regular Follow up. Your Appointments 8/22/2018  9:50 AM  
Follow Up with Edie Lucio MD  
[facility] [address] [place] [address]) Appt Note: 6 month f/u  Diabetes; R/S  [id number]  [facility] [address] [id number] [address] [place] [address] Upcoming Health Maintenance Date Due Pneumococcal 65+ Low/Medium Risk (2 of 2 - PPSV23) 12/12/2018* Influenza Age 5 to Adult 2/15/2019* HEMOGLOBIN A1C Q6M 10/19/2018 MICROALBUMIN Q1 11/15/2018 EYE EXAM RETINAL OR DILATED Q1 11/30/2018 MEDICARE YEARLY EXAM 4/19/2019 LIPID PANEL Q1 4/19/2019 FOOT EXAM Q1 8/15/2019 GLAUCOMA SCREENING Q2Y 11/30/2019 DTaP/Tdap/Td series (2 - Td) 5/18/2027 *Topic was postponed. The date shown is not the original due date. Allergies as of 8/15/2018  Review Complete On: 8/15/2018 By: Ray Morales MD  
  
 Severity Noted Reaction Type Reactions Statins-hmg-coa Reductase Inhibitors  03/23/2016   Side Effect Myalgia Tried several statins. Current Immunizations  Never Reviewed Name Date Influenza High Dose Vaccine PF 10/5/2017 Not reviewed this visit You Were Diagnosed With   
  
 Codes Comments Hypercoagulable state ([facility]    -  Primary ICD-10-CM: U28.66 
ICD-9-CM: 289.81 History of venous thromboembolism     ICD-10-CM: Z86.718 ICD-9-CM: V12.51   
 Encounter for monitoring coumadin therapy     ICD-10-CM: Z51.81, Z79.01 
ICD-9-CM: V58.83, V58.61 Coronary artery disease due to lipid rich plaque     ICD-10-CM: I25.10, I25.83 ICD-9-CM: 414.00, 414.3 Hyperlipidemia LDL goal <100     ICD-10-CM: E78.5 ICD-9-CM: 272.4 S/P angioplasty with stent     ICD-10-CM: Z95.9 ICD-9-CM: V45.89 Type 2 diabetes mellitus without complication, with long-term current use of insulin (HCC)     ICD-10-CM: E11.9, Z79.4 ICD-9-CM: 250.00, V58.67 Essential hypertension     ICD-10-CM: I10 
ICD-9-CM: 401.9 Chronic pain of left elbow     ICD-10-CM: M25.522, G89.29 ICD-9-CM: 719.42, 338.29 Benign prostatic hyperplasia, unspecified whether lower urinary tract symptoms present     ICD-10-CM: N40.0 ICD-9-CM: 600.00 Obesity, morbid ([address]     ICD-10-CM: E66.01 
ICD-9-CM: 278.01 Vitals BP Pulse Temp Resp Height(growth percentile) Weight(growth percentile) 139/71 (BP 1 Location: Left arm, BP Patient Position: Sitting) 72 96.8 °F (36 °C) (Oral) 18 5' 11\" (1.803 m) 283 lb 12.8 oz (128.7 kg) SpO2 BMI Smoking Status 97% 39.58 kg/m2 Former Smoker Vitals History BMI and BSA Data Body Mass Index Body Surface Area  
 39.58 kg/m 2 2.54 m 2 Preferred Pharmacy Pharmacy Name Phone [id number] [address] [phone] Your Updated Medication List  
  
   
This list is accurate as of 8/15/18  9:53 AM.  Always use your most recent med list.  
  
  
  
  
 aspirin 81 mg chewable tablet Take 1 Tab by mouth daily. cetirizine 10 mg tablet Commonly known as:  ZYRTEC Take 1 Tab by mouth nightly. Indications: SEASONAL ALLERGIC RHINITIS  
  
 clopidogrel 75 mg Tab Commonly known as:  PLAVIX Take 1 Tab by mouth daily. COUMADIN 4 mg tablet Generic drug:  warfarin TAKE 2 TABLETS DAILY  
  
 diclofenac 1 % Gel Commonly known as:  VOLTAREN  
 Apply  to affected area four (4) times daily. exenatide microspheres 2 mg/0.65 mL Pnij Commonly known as:  BYDUREON  
2 mg by SubCUTAneous route every seven (7) days. For diabetes  
  
 finasteride 5 mg tablet Commonly known as:  PROSCAR  
TAKE 1 TABLET DAILY FOR SYMPTOMATIC BENIGN PROSTATIC HYPERPLASIA FLEXERIL 10 mg tablet Generic drug:  cyclobenzaprine Take  by mouth three (3) times daily as needed for Muscle Spasm(s). insulin glargine 100 unit/mL (3 mL) Inpn Commonly known as:  LANTUS SOLOSTAR U-100 INSULIN  
55 Units by SubCUTAneous route daily. lansoprazole 30 mg capsule Commonly known as:  PREVACID Take 1 Cap by mouth Daily (before breakfast). magnesium oxide 400 mg tablet Commonly known as:  MAG-OX Take 1 Tab by mouth four (4) times daily. For low magnesium  
  
 metFORMIN 1,000 mg tablet Commonly known as:  GLUCOPHAGE Take 1 Tab by mouth two (2) times a day. metoprolol succinate 25 mg XL tablet Commonly known as:  TOPROL-XL Take 0.5 Tabs by mouth daily. Linda Pen Needle 32 gauge x 5/32\" Ndle Generic drug:  Insulin Needles (Disposable) USE 1 NEEDLE UNDER THE SKIN DAILY Telmisartan-amLODIPine 80-5 mg Tab TAKE 1 TABLET NIGHTLY FOR BLOOD PRESSURE topiramate 25 mg tablet Commonly known as:  TOPAMAX Take 1 Tab by mouth two (2) times a day. VITAMIN B-12 1,000 mcg tablet Generic drug:  cyanocobalamin Take 1,000 mcg by mouth daily. Nature Made VITAMIN D3 5,000 unit Tab tablet Generic drug:  cholecalciferol (VITAMIN D3) Take  by mouth daily. Nature Made Prescriptions Sent to Pharmacy Refills  
 finasteride (PROSCAR) 5 mg tablet 1 Sig: TAKE 1 TABLET DAILY FOR SYMPTOMATIC BENIGN PROSTATIC HYPERPLASIA  Class: Normal  
 Pharmacy: [address] Ph #: [phone]  
 topiramate (TOPAMAX) 25 mg tablet 2  
 Sig: Take 1 Tab by mouth two (2) times a day. Class: Normal  
 Pharmacy: [id number] [address], [address] Ph #: [phone] Route: Oral  
 diclofenac (VOLTAREN) 1 % gel 5 Sig: Apply  to affected area four (4) times daily. Class: Normal  
 Pharmacy: [id number] [address], [address] Ph #: [phone] Route: Topical  
  
We Performed the Following  DIABETES FOOT EXAM [7 Custom] Follow-up Instructions Return in about 3 months (around 11/15/2018) for Regular Follow up. Patient Instructions Topiramate - Start with 1 tablet (25 mg) at bedtime x 2 weeks. You may increase this to 25 mg twice daily after this if desired. Side effect may be sleepiness or changes in your thinking. Let me know if you have questions The most important part of your overall plan for weight loss is to continue working on exercise and diet. Introducing [facility]! [facility] introduces [employer] patient portal. Now you can access parts of your medical record, email your doctor's office, and request medication refills online. 1. In your internet browser, go to [email or web address] 2. Click on the First Time User? Click Here link in the Sign In box. You will see the New Member Sign Up page. 3. Enter your [employer] Access Code exactly as it appears below. You will not need to use this code after youve completed the sign-up process. If you do not sign up before the expiration date, you must request a new code. · [employer] Access Code: [email or web address] Expires: 10/13/2018  5:20 AM 
 
4. Enter the last four digits of your Social Security Number (xxxx) and Date of Birth (mm/dd/yyyy) as indicated and click Submit. You will be taken to the next sign-up page. 5. Create a [employer] ID. This will be your [employer] login ID and cannot be changed, so think of one that is secure and easy to remember. 6. Create a [employer] password. You can change your password at any time. 7. Enter your Password Reset Question and Answer. This can be used at a later time if you forget your password. 8. Enter your e-mail address. You will receive e-mail notification when new information is available in [address] 9. Click Sign Up. You can now view and download portions of your medical record. 10. Click the Download Summary menu link to download a portable copy of your medical information. If you have questions, please visit the Frequently Asked Questions section of the [employer] website. Remember, [employer] is NOT to be used for urgent needs. For medical emergencies, dial 911. Now available from your iPhone and Android! Please provide this summary of care documentation to your next provider. Your primary care clinician is listed as Chrissy Hunter. If you have any questions after today's visit, please call [phone].

## 2018-08-15 NOTE — PATIENT INSTRUCTIONS
Topiramate - Start with 1 tablet (25 mg) at bedtime x 2 weeks. You may increase this to 25 mg twice daily after this if desired. Side effect may be sleepiness or changes in your thinking. Let me know if you have questions    The most important part of your overall plan for weight loss is to continue working on exercise and diet.

## 2018-08-16 ENCOUNTER — APPOINTMENT (OUTPATIENT)
Dept: GENERAL RADIOLOGY | Age: 83
DRG: 282 | End: 2018-08-16
Attending: EMERGENCY MEDICINE
Payer: MEDICARE

## 2018-08-16 ENCOUNTER — HOSPITAL ENCOUNTER (INPATIENT)
Age: 83
LOS: 2 days | Discharge: HOME | DRG: 282 | End: 2018-08-18
Attending: EMERGENCY MEDICINE | Admitting: INTERNAL MEDICINE
Payer: MEDICARE

## 2018-08-16 DIAGNOSIS — I21.4 NSTEMI (NON-ST ELEVATED MYOCARDIAL INFARCTION) (HCC): Primary | ICD-10-CM

## 2018-08-16 LAB
ALBUMIN SERPL-MCNC: 3.6 G/DL (ref 3.5–5)
ALBUMIN/GLOB SERPL: 0.9 {RATIO} (ref 1.1–2.2)
ALP SERPL-CCNC: 69 U/L (ref 45–117)
ALT SERPL-CCNC: 27 U/L (ref 12–78)
ANION GAP SERPL CALC-SCNC: 10 MMOL/L (ref 5–15)
AST SERPL-CCNC: 45 U/L (ref 15–37)
ATRIAL RATE: 88 BPM
BASOPHILS # BLD: 0 K/UL (ref 0–0.1)
BASOPHILS NFR BLD: 1 % (ref 0–1)
BILIRUB SERPL-MCNC: 0.6 MG/DL (ref 0.2–1)
BUN SERPL-MCNC: 18 MG/DL (ref 6–20)
BUN/CREAT SERPL: 13 (ref 12–20)
CALCIUM SERPL-MCNC: 9.3 MG/DL (ref 8.5–10.1)
CALCULATED P AXIS, ECG09: 40 DEGREES
CALCULATED R AXIS, ECG10: -52 DEGREES
CALCULATED T AXIS, ECG11: 72 DEGREES
CHLORIDE SERPL-SCNC: 105 MMOL/L (ref 97–108)
CO2 SERPL-SCNC: 20 MMOL/L (ref 21–32)
CREAT SERPL-MCNC: 1.35 MG/DL (ref 0.7–1.3)
DIAGNOSIS, 93000: NORMAL
DIFFERENTIAL METHOD BLD: ABNORMAL
EOSINOPHIL # BLD: 0.2 K/UL (ref 0–0.4)
EOSINOPHIL NFR BLD: 2 % (ref 0–7)
ERYTHROCYTE [DISTWIDTH] IN BLOOD BY AUTOMATED COUNT: 14 % (ref 11.5–14.5)
GLOBULIN SER CALC-MCNC: 4 G/DL (ref 2–4)
GLUCOSE BLD STRIP.AUTO-MCNC: 175 MG/DL (ref 65–100)
GLUCOSE BLD STRIP.AUTO-MCNC: 86 MG/DL (ref 65–100)
GLUCOSE SERPL-MCNC: 141 MG/DL (ref 65–100)
HCT VFR BLD AUTO: 35.4 % (ref 36.6–50.3)
HGB BLD-MCNC: 12.5 G/DL (ref 12.1–17)
IMM GRANULOCYTES # BLD: 0 K/UL (ref 0–0.04)
IMM GRANULOCYTES NFR BLD AUTO: 0 % (ref 0–0.5)
INR PPP: 1.4 (ref 0.9–1.1)
LYMPHOCYTES # BLD: 2 K/UL (ref 0.8–3.5)
LYMPHOCYTES NFR BLD: 25 % (ref 12–49)
MCH RBC QN AUTO: 31.1 PG (ref 26–34)
MCHC RBC AUTO-ENTMCNC: 35.3 G/DL (ref 30–36.5)
MCV RBC AUTO: 88.1 FL (ref 80–99)
MONOCYTES # BLD: 0.7 K/UL (ref 0–1)
MONOCYTES NFR BLD: 9 % (ref 5–13)
NEUTS SEG # BLD: 4.8 K/UL (ref 1.8–8)
NEUTS SEG NFR BLD: 63 % (ref 32–75)
NRBC # BLD: 0 K/UL (ref 0–0.01)
NRBC BLD-RTO: 0 PER 100 WBC
P-R INTERVAL, ECG05: 220 MS
PLATELET # BLD AUTO: 305 K/UL (ref 150–400)
PMV BLD AUTO: 8.7 FL (ref 8.9–12.9)
POTASSIUM SERPL-SCNC: 4.2 MMOL/L (ref 3.5–5.1)
PROT SERPL-MCNC: 7.6 G/DL (ref 6.4–8.2)
PROTHROMBIN TIME: 14.1 SEC (ref 9–11.1)
Q-T INTERVAL, ECG07: 388 MS
QRS DURATION, ECG06: 142 MS
QTC CALCULATION (BEZET), ECG08: 469 MS
RBC # BLD AUTO: 4.02 M/UL (ref 4.1–5.7)
SERVICE CMNT-IMP: ABNORMAL
SERVICE CMNT-IMP: NORMAL
SODIUM SERPL-SCNC: 135 MMOL/L (ref 136–145)
TROPONIN I SERPL-MCNC: 0.33 NG/ML
VENTRICULAR RATE, ECG03: 88 BPM
WBC # BLD AUTO: 7.7 K/UL (ref 4.1–11.1)

## 2018-08-16 PROCEDURE — 85610 PROTHROMBIN TIME: CPT | Performed by: EMERGENCY MEDICINE

## 2018-08-16 PROCEDURE — 82962 GLUCOSE BLOOD TEST: CPT

## 2018-08-16 PROCEDURE — 84484 ASSAY OF TROPONIN QUANT: CPT | Performed by: EMERGENCY MEDICINE

## 2018-08-16 PROCEDURE — 74011250636 HC RX REV CODE- 250/636

## 2018-08-16 PROCEDURE — 77030008543 HC TBNG MON PRSS MRTM -A

## 2018-08-16 PROCEDURE — 74011250636 HC RX REV CODE- 250/636: Performed by: INTERNAL MEDICINE

## 2018-08-16 PROCEDURE — 93005 ELECTROCARDIOGRAM TRACING: CPT

## 2018-08-16 PROCEDURE — 77030019698 HC SYR ANGI MDLON MRTM -A

## 2018-08-16 PROCEDURE — 74011000250 HC RX REV CODE- 250

## 2018-08-16 PROCEDURE — C1769 GUIDE WIRE: HCPCS

## 2018-08-16 PROCEDURE — 74011636320 HC RX REV CODE- 636/320

## 2018-08-16 PROCEDURE — 80053 COMPREHEN METABOLIC PANEL: CPT | Performed by: EMERGENCY MEDICINE

## 2018-08-16 PROCEDURE — 36415 COLL VENOUS BLD VENIPUNCTURE: CPT | Performed by: EMERGENCY MEDICINE

## 2018-08-16 PROCEDURE — C1894 INTRO/SHEATH, NON-LASER: HCPCS

## 2018-08-16 PROCEDURE — 77030028837 HC SYR ANGI PWR INJ COEU -A

## 2018-08-16 PROCEDURE — 65660000000 HC RM CCU STEPDOWN

## 2018-08-16 PROCEDURE — 85025 COMPLETE CBC W/AUTO DIFF WBC: CPT | Performed by: EMERGENCY MEDICINE

## 2018-08-16 PROCEDURE — 77030010221 HC SPLNT WR POS TELE -B

## 2018-08-16 PROCEDURE — 77030019569 HC BND COMPR RAD TERU -B

## 2018-08-16 PROCEDURE — 4A023N7 MEASUREMENT OF CARDIAC SAMPLING AND PRESSURE, LEFT HEART, PERCUTANEOUS APPROACH: ICD-10-PCS | Performed by: INTERNAL MEDICINE

## 2018-08-16 PROCEDURE — 71045 X-RAY EXAM CHEST 1 VIEW: CPT

## 2018-08-16 PROCEDURE — B2151ZZ FLUOROSCOPY OF LEFT HEART USING LOW OSMOLAR CONTRAST: ICD-10-PCS | Performed by: INTERNAL MEDICINE

## 2018-08-16 PROCEDURE — 99285 EMERGENCY DEPT VISIT HI MDM: CPT

## 2018-08-16 PROCEDURE — B2111ZZ FLUOROSCOPY OF MULTIPLE CORONARY ARTERIES USING LOW OSMOLAR CONTRAST: ICD-10-PCS | Performed by: INTERNAL MEDICINE

## 2018-08-16 PROCEDURE — 77030004549 HC CATH ANGI DX PRF MRTM -A

## 2018-08-16 PROCEDURE — 99153 MOD SED SAME PHYS/QHP EA: CPT

## 2018-08-16 RX ORDER — MAGNESIUM SULFATE 100 %
4 CRYSTALS MISCELLANEOUS AS NEEDED
Status: DISCONTINUED | OUTPATIENT
Start: 2018-08-16 | End: 2018-08-18 | Stop reason: HOSPADM

## 2018-08-16 RX ORDER — FINASTERIDE 5 MG/1
5 TABLET, FILM COATED ORAL DAILY
Status: DISCONTINUED | OUTPATIENT
Start: 2018-08-17 | End: 2018-08-18 | Stop reason: HOSPADM

## 2018-08-16 RX ORDER — WARFARIN 4 MG/1
8 TABLET ORAL EVERY EVENING
COMMUNITY
End: 2018-09-04 | Stop reason: SDUPTHER

## 2018-08-16 RX ORDER — NALOXONE HYDROCHLORIDE 0.4 MG/ML
0.4 INJECTION, SOLUTION INTRAMUSCULAR; INTRAVENOUS; SUBCUTANEOUS AS NEEDED
Status: DISCONTINUED | OUTPATIENT
Start: 2018-08-16 | End: 2018-08-18 | Stop reason: HOSPADM

## 2018-08-16 RX ORDER — SODIUM CHLORIDE 9 MG/ML
100 INJECTION, SOLUTION INTRAVENOUS CONTINUOUS
Status: DISCONTINUED | OUTPATIENT
Start: 2018-08-16 | End: 2018-08-17

## 2018-08-16 RX ORDER — HEPARIN SODIUM 1000 [USP'U]/ML
1000-10000 INJECTION, SOLUTION INTRAVENOUS; SUBCUTANEOUS
Status: DISCONTINUED | OUTPATIENT
Start: 2018-08-16 | End: 2018-08-16

## 2018-08-16 RX ORDER — VERAPAMIL HYDROCHLORIDE 2.5 MG/ML
INJECTION, SOLUTION INTRAVENOUS
Status: COMPLETED
Start: 2018-08-16 | End: 2018-08-16

## 2018-08-16 RX ORDER — FENTANYL CITRATE 50 UG/ML
25-50 INJECTION, SOLUTION INTRAMUSCULAR; INTRAVENOUS
Status: DISCONTINUED | OUTPATIENT
Start: 2018-08-16 | End: 2018-08-16

## 2018-08-16 RX ORDER — LIDOCAINE HYDROCHLORIDE 10 MG/ML
INJECTION, SOLUTION EPIDURAL; INFILTRATION; INTRACAUDAL; PERINEURAL
Status: COMPLETED
Start: 2018-08-16 | End: 2018-08-16

## 2018-08-16 RX ORDER — MIDAZOLAM HYDROCHLORIDE 1 MG/ML
.5-2 INJECTION, SOLUTION INTRAMUSCULAR; INTRAVENOUS
Status: DISCONTINUED | OUTPATIENT
Start: 2018-08-16 | End: 2018-08-16

## 2018-08-16 RX ORDER — ONDANSETRON 2 MG/ML
4 INJECTION INTRAMUSCULAR; INTRAVENOUS
Status: DISCONTINUED | OUTPATIENT
Start: 2018-08-16 | End: 2018-08-18 | Stop reason: HOSPADM

## 2018-08-16 RX ORDER — HEPARIN SODIUM 200 [USP'U]/100ML
500 INJECTION, SOLUTION INTRAVENOUS ONCE
Status: COMPLETED | OUTPATIENT
Start: 2018-08-16 | End: 2018-08-16

## 2018-08-16 RX ORDER — MIDAZOLAM HYDROCHLORIDE 1 MG/ML
INJECTION, SOLUTION INTRAMUSCULAR; INTRAVENOUS
Status: COMPLETED
Start: 2018-08-16 | End: 2018-08-16

## 2018-08-16 RX ORDER — CYCLOBENZAPRINE HCL 10 MG
10 TABLET ORAL
Status: DISCONTINUED | OUTPATIENT
Start: 2018-08-16 | End: 2018-08-18 | Stop reason: HOSPADM

## 2018-08-16 RX ORDER — SODIUM CHLORIDE 9 MG/ML
100 INJECTION, SOLUTION INTRAVENOUS CONTINUOUS
Status: DISCONTINUED | OUTPATIENT
Start: 2018-08-16 | End: 2018-08-16

## 2018-08-16 RX ORDER — LIDOCAINE HYDROCHLORIDE 10 MG/ML
1-30 INJECTION, SOLUTION EPIDURAL; INFILTRATION; INTRACAUDAL; PERINEURAL
Status: DISCONTINUED | OUTPATIENT
Start: 2018-08-16 | End: 2018-08-16

## 2018-08-16 RX ORDER — HEPARIN SODIUM 200 [USP'U]/100ML
INJECTION, SOLUTION INTRAVENOUS
Status: COMPLETED
Start: 2018-08-16 | End: 2018-08-16

## 2018-08-16 RX ORDER — VERAPAMIL HYDROCHLORIDE 2.5 MG/ML
2.5 INJECTION, SOLUTION INTRAVENOUS ONCE
Status: COMPLETED | OUTPATIENT
Start: 2018-08-16 | End: 2018-08-16

## 2018-08-16 RX ORDER — FENTANYL CITRATE 50 UG/ML
INJECTION, SOLUTION INTRAMUSCULAR; INTRAVENOUS
Status: COMPLETED
Start: 2018-08-16 | End: 2018-08-16

## 2018-08-16 RX ORDER — LANOLIN ALCOHOL/MO/W.PET/CERES
800 CREAM (GRAM) TOPICAL 2 TIMES DAILY
COMMUNITY

## 2018-08-16 RX ORDER — INSULIN LISPRO 100 [IU]/ML
INJECTION, SOLUTION INTRAVENOUS; SUBCUTANEOUS
Status: DISCONTINUED | OUTPATIENT
Start: 2018-08-16 | End: 2018-08-18 | Stop reason: HOSPADM

## 2018-08-16 RX ORDER — SODIUM CHLORIDE 0.9 % (FLUSH) 0.9 %
5-10 SYRINGE (ML) INJECTION AS NEEDED
Status: DISCONTINUED | OUTPATIENT
Start: 2018-08-16 | End: 2018-08-18 | Stop reason: HOSPADM

## 2018-08-16 RX ORDER — SODIUM CHLORIDE 0.9 % (FLUSH) 0.9 %
5-10 SYRINGE (ML) INJECTION EVERY 8 HOURS
Status: DISCONTINUED | OUTPATIENT
Start: 2018-08-16 | End: 2018-08-18 | Stop reason: HOSPADM

## 2018-08-16 RX ORDER — PANTOPRAZOLE SODIUM 40 MG/1
40 TABLET, DELAYED RELEASE ORAL
Status: DISCONTINUED | OUTPATIENT
Start: 2018-08-17 | End: 2018-08-18 | Stop reason: HOSPADM

## 2018-08-16 RX ORDER — INSULIN GLARGINE 100 [IU]/ML
55 INJECTION, SOLUTION SUBCUTANEOUS DAILY
Status: DISCONTINUED | OUTPATIENT
Start: 2018-08-17 | End: 2018-08-18 | Stop reason: HOSPADM

## 2018-08-16 RX ORDER — TOPIRAMATE 25 MG/1
25 TABLET ORAL 2 TIMES DAILY
Status: DISCONTINUED | OUTPATIENT
Start: 2018-08-16 | End: 2018-08-17

## 2018-08-16 RX ORDER — CLOPIDOGREL BISULFATE 75 MG/1
75 TABLET ORAL DAILY
Status: DISCONTINUED | OUTPATIENT
Start: 2018-08-17 | End: 2018-08-18 | Stop reason: HOSPADM

## 2018-08-16 RX ORDER — GUAIFENESIN 100 MG/5ML
81 LIQUID (ML) ORAL DAILY
Status: DISCONTINUED | OUTPATIENT
Start: 2018-08-17 | End: 2018-08-18 | Stop reason: HOSPADM

## 2018-08-16 RX ORDER — ACETAMINOPHEN 325 MG/1
650 TABLET ORAL
Status: DISCONTINUED | OUTPATIENT
Start: 2018-08-16 | End: 2018-08-18 | Stop reason: HOSPADM

## 2018-08-16 RX ORDER — DEXTROSE 50 % IN WATER (D50W) INTRAVENOUS SYRINGE
12.5-25 AS NEEDED
Status: DISCONTINUED | OUTPATIENT
Start: 2018-08-16 | End: 2018-08-18 | Stop reason: HOSPADM

## 2018-08-16 RX ORDER — METOPROLOL SUCCINATE 25 MG/1
12.5 TABLET, EXTENDED RELEASE ORAL DAILY
Status: DISCONTINUED | OUTPATIENT
Start: 2018-08-17 | End: 2018-08-17

## 2018-08-16 RX ORDER — HEPARIN SODIUM 1000 [USP'U]/ML
INJECTION, SOLUTION INTRAVENOUS; SUBCUTANEOUS
Status: COMPLETED
Start: 2018-08-16 | End: 2018-08-16

## 2018-08-16 RX ADMIN — Medication 10 ML: at 21:19

## 2018-08-16 RX ADMIN — HEPARIN SODIUM 1000 UNITS: 200 INJECTION, SOLUTION INTRAVENOUS at 16:03

## 2018-08-16 RX ADMIN — SODIUM CHLORIDE 100 ML/HR: 900 INJECTION, SOLUTION INTRAVENOUS at 17:16

## 2018-08-16 RX ADMIN — MIDAZOLAM 1 MG: 1 INJECTION INTRAMUSCULAR; INTRAVENOUS at 16:04

## 2018-08-16 RX ADMIN — HEPARIN SODIUM 2500 UNITS: 1000 INJECTION INTRAVENOUS; SUBCUTANEOUS at 16:19

## 2018-08-16 RX ADMIN — MIDAZOLAM HYDROCHLORIDE 1 MG: 1 INJECTION, SOLUTION INTRAMUSCULAR; INTRAVENOUS at 16:18

## 2018-08-16 RX ADMIN — VERAPAMIL HYDROCHLORIDE 2.5 MG: 2.5 INJECTION, SOLUTION INTRAVENOUS at 16:20

## 2018-08-16 RX ADMIN — LIDOCAINE HYDROCHLORIDE 1 ML: 10 INJECTION, SOLUTION EPIDURAL; INFILTRATION; INTRACAUDAL; PERINEURAL at 16:18

## 2018-08-16 RX ADMIN — FENTANYL CITRATE 25 MCG: 50 INJECTION, SOLUTION INTRAMUSCULAR; INTRAVENOUS at 16:18

## 2018-08-16 RX ADMIN — VERAPAMIL HYDROCHLORIDE 2.5 MG: 2.5 INJECTION INTRAVENOUS at 16:20

## 2018-08-16 RX ADMIN — HEPARIN SODIUM 2500 UNITS: 1000 INJECTION, SOLUTION INTRAVENOUS; SUBCUTANEOUS at 16:19

## 2018-08-16 RX ADMIN — IOPAMIDOL 110 ML: 755 INJECTION, SOLUTION INTRAVENOUS at 16:54

## 2018-08-16 RX ADMIN — MIDAZOLAM HYDROCHLORIDE 1 MG: 1 INJECTION, SOLUTION INTRAMUSCULAR; INTRAVENOUS at 16:04

## 2018-08-16 RX ADMIN — FENTANYL CITRATE 25 MCG: 50 INJECTION, SOLUTION INTRAMUSCULAR; INTRAVENOUS at 16:04

## 2018-08-16 RX ADMIN — NITROGLYCERIN 200 MCG: 5 INJECTION, SOLUTION INTRAVENOUS at 16:19

## 2018-08-16 RX ADMIN — Medication 10 ML: at 17:17

## 2018-08-16 RX ADMIN — IOPAMIDOL 30 ML: 755 INJECTION, SOLUTION INTRAVENOUS at 16:53

## 2018-08-16 RX ADMIN — HEPARIN SODIUM 1000 UNITS: 200 INJECTION, SOLUTION INTRAVENOUS at 16:04

## 2018-08-16 NOTE — ED NOTES
Pt provided with urinal and asked again for urine specimen at this time. Patient states he will let RN know when he is able to void.

## 2018-08-16 NOTE — ED PROVIDER NOTES
EMERGENCY DEPARTMENT HISTORY AND PHYSICAL EXAM      Date: 8/16/2018  Patient Name: Siddhartha Hudson    History of Presenting Illness     Chief Complaint   Patient presents with    Chest Pain    Nausea    Breathing Problem    Vomiting     History Provided By: Patient and EMS    HPI: Siddhartha Hudson, 80 y.o. male with PMHx significant for DM, HTN, CAD, GERD, presents via EMS to the ED with cc of new onset non-radiating mid CP, ongoing for a few hours. Pt reports chronic dry cough, nausea and diaphoresis alongside cc. Pt states that he was sitting down during onset of sxs. Per pt, his CP began at 12:40 PM, 15 minutes prior to EMS arrival. EMS notes to giving 2 doses to nitroglycerine and 324 mg of ASA on route. He endorses to being on Plavix and cumidine currently due to blood clots in legs. Pt denies any alleviating or exacerbating factors. He specifically denies any HA, SOB, fevers, chills, nausea, vomiting, abdominal pain or diarrhea. Chief Complaint: CP  Duration: few Hours  Timing:  new onset  Location: mid chest  Quality: N/A  Severity: Moderate  Modifying Factors: denies any modifying factors  Associated Symptoms: dry cough, nausea, diaphoresis    There are no other complaints, changes, or physical findings at this time.     PCP: Miguel Abreu MD    Current Facility-Administered Medications   Medication Dose Route Frequency Provider Last Rate Last Dose    [START ON 8/18/2018] metoprolol succinate (TOPROL-XL) XL tablet 25 mg  25 mg Oral DAILY Orlin Anaya MD        amLODIPine (NORVASC) tablet 5 mg  5 mg Oral DAILY Orlin Anaya MD   5 mg at 08/17/18 1121    warfarin (COUMADIN) tablet 8 mg  8 mg Oral QPM Orlin Anaya MD   8 mg at 08/17/18 1823    clopidogrel (PLAVIX) tablet 75 mg  75 mg Oral DAILY Orlin Anaya MD   75 mg at 08/17/18 0824    cyclobenzaprine (FLEXERIL) tablet 10 mg  10 mg Oral TID PRN Geri Santana MD        finasteride (PROSCAR) tablet 5 mg  5 mg Oral DAILY Orlin Louise Alanis MD   5 mg at 08/17/18 7561    insulin glargine (LANTUS) injection 55 Units  55 Units SubCUTAneous DAILY Sophia Abad MD   55 Units at 08/17/18 0825    pantoprazole (PROTONIX) tablet 40 mg  40 mg Oral ACB Orlin Anaya MD   40 mg at 08/17/18 6395    aspirin chewable tablet 81 mg  81 mg Oral DAILY Orlin Anaya MD   81 mg at 08/17/18 3742    insulin lispro (HUMALOG) injection   SubCUTAneous AC&HS Sophia Abad MD   Stopped at 08/17/18 1630    glucose chewable tablet 16 g  4 Tab Oral PRN Orlin Anaya MD        dextrose (D50W) injection syrg 12.5-25 g  12.5-25 g IntraVENous PRN Sophia Abad MD        glucagon (GLUCAGEN) injection 1 mg  1 mg IntraMUSCular PRN Sophia Abad MD        sodium chloride (NS) flush 5-10 mL  5-10 mL IntraVENous Q8H Orlin Anaya MD   10 mL at 08/17/18 1500    sodium chloride (NS) flush 5-10 mL  5-10 mL IntraVENous PRN Orlin Anaya MD        acetaminophen (TYLENOL) tablet 650 mg  650 mg Oral Q4H PRN Orlin Anaya MD        naloxone (NARCAN) injection 0.4 mg  0.4 mg IntraVENous PRN Sophia Abad MD        ondansetron (ZOFRAN) injection 4 mg  4 mg IntraVENous Q4H PRN Sophia Abad MD           Past History     Past Medical History:  Past Medical History:   Diagnosis Date    Arthritis     Back injury 12    CAD (coronary artery disease)     Stent replacement    Diabetes ([facility] [address]     GERD (gastroesophageal reflux disease)     Hypertension     Muscle cramps     PUD (peptic ulcer disease)     Reflux     Thromboembolus ([facility] [address]     L BLE       Past Surgical History:  Past Surgical History:   Procedure Laterality Date    HX ORTHOPAEDIC Left     Crushed/left index finger amputee    HX ORTHOPAEDIC      heal spur removed left foot    HX OTHER SURGICAL      hand surgery, heal spur L    UPPER GI ENDOSCOPY,BIOPSY  5/16/2018         UPPER GI ENDOSCOPY,DILATN W GUIDE  5/16/2018            Family History:  Family History   Problem Relation Age of Onset  Heart Disease Brother     Bleeding Prob Father     Tuberculosis Sister        Social History:  Social History   Substance Use Topics    Smoking status: Former Smoker     Packs/day: 1.00     Quit date: 1/1/1975    Smokeless tobacco: Never Used    Alcohol use Yes      Comment: once in a while       Allergies: Allergies   Allergen Reactions    Statins-Hmg-Coa Reductase Inhibitors Myalgia     Tried several statins. Review of Systems   Review of Systems   Constitutional: Positive for diaphoresis. Negative for activity change, chills and fever. HENT: Negative for congestion and sore throat. Eyes: Negative for pain and redness. Respiratory: Positive for cough (dry). Negative for chest tightness and shortness of breath. Cardiovascular: Positive for chest pain (mid). Negative for palpitations. Gastrointestinal: Positive for nausea. Negative for abdominal pain, diarrhea and vomiting. Genitourinary: Negative for dysuria, frequency and urgency. Musculoskeletal: Negative for back pain and neck pain. Skin: Negative for rash. Neurological: Negative for syncope, light-headedness and headaches. Psychiatric/Behavioral: Negative for confusion. All other systems reviewed and are negative. Physical Exam   Physical Exam   Constitutional: He is oriented to person, place, and time. He appears well-developed and well-nourished. No distress. Obese   HENT:   Head: Normocephalic. Nose: Nose normal.   Mouth/Throat: Oropharynx is clear and moist. No oropharyngeal exudate. Eyes: Conjunctivae are normal. Pupils are equal, round, and reactive to light. No scleral icterus. Neck: Normal range of motion. Neck supple. No JVD present. No tracheal deviation present. No thyromegaly present. Cardiovascular: Normal rate, regular rhythm and intact distal pulses. Exam reveals no gallop and no friction rub. No murmur heard. Pulmonary/Chest: Effort normal and breath sounds normal. No stridor.  No respiratory distress. He has no wheezes. He has no rales. Abdominal: Soft. Bowel sounds are normal. He exhibits no distension. There is no tenderness. There is no rebound and no guarding. Musculoskeletal: Normal range of motion. He exhibits no edema. Lymphadenopathy:     He has no cervical adenopathy. Neurological: He is alert and oriented to person, place, and time. No cranial nerve deficit. He exhibits normal muscle tone. Coordination normal.   Skin: Skin is warm and dry. No rash noted. He is not diaphoretic. No erythema. Psychiatric: He has a normal mood and affect. His behavior is normal.   Nursing note and vitals reviewed. Diagnostic Study Results     Labs -     Recent Results (from the past 12 hour(s))   GLUCOSE, POC    Collection Time: 08/17/18 11:24 AM   Result Value Ref Range    Glucose (POC) 215 (H) 65 - 100 mg/dL    Performed by Angelic Jiménez, POC    Collection Time: 08/17/18  5:02 PM   Result Value Ref Range    Glucose (POC) 114 (H) 65 - 100 mg/dL    Performed by Micaela Allred      Radiologic Studies -   CXR Results  (Last 48 hours)               08/16/18 1356  XR CHEST PORT Final result    Impression:  IMPRESSION: There is mild interstitial prominence which allowing for differences   in technique is most likely stable. No definite acute abnormality identified               Narrative:  EXAM:  XR CHEST PORT       INDICATION:  Chest Pain       COMPARISON:  2017       FINDINGS: A portable AP radiograph of the chest was obtained at 1352 hours. .    There is interstitial prominence which allowing for differences in technique is   most likely stable. .  Mild cardiomegaly is stable. .  There is mild prominence of   the main pulmonary artery. Bony structures are unchanged. Medical Decision Making   I am the first provider for this patient.     I reviewed the vital signs, available nursing notes, past medical history, past surgical history, family history and social history. Vital Signs-Reviewed the patient's vital signs. Patient Vitals for the past 12 hrs:   Temp Pulse Resp BP SpO2   08/17/18 1901 98 °F (36.7 °C) 67 18 136/63 98 %   08/17/18 1513 97.9 °F (36.6 °C) 73 18 117/65 96 %   08/17/18 1119 97.7 °F (36.5 °C) 71 18 134/60 95 %   08/17/18 1025 98.1 °F (36.7 °C) 68 18 132/66 98 %     Pulse Oximetry Analysis - 97% on RA    Cardiac Monitor:   Rate: 88 bpm  Rhythm: sinus rhythm     ED EKG interpretation: 13:27  Rhythm: sinus rhythm; and regular . Rate (approx.): 88; Axis: left axis deviation; VA Interval: prolonged; 1st degree av block QRS interval: LBBB; ST/T wave: non-specific changes; When compared to ekg on  4/18/18, no significant change was found. This EKG was interpreted by Alexa Jimenes MD,ED Provider. Records Reviewed: Nursing Notes, Old Medical Records and Previous Laboratory Studies    Provider Notes (Medical Decision Making):   DDx: ACS, CHF, PE    ED Course:   Initial assessment performed. The patients presenting problems have been discussed, and they are in agreement with the care plan formulated and outlined with them. I have encouraged them to ask questions as they arise throughout their visit. 2:17 PM   Pt has a troponin of 0.33, will consult cardiology. Consult Note:  2:44 PM  Alexa Jimenes MD spoke with Alex Wakefield MD.  Specialty: cardiology  Discussed pt's hx, disposition, and available diagnostic and imaging results. Reviewed care plans. Consultant will evaluate pt in ED.    2:49 PM  Pt has been re-evaluated and denies any pain at this time. Critical Care Time: 0    Disposition:  Admit Note:  3:29 PM  Pt is being admitted by Ashleigh Sports. The results of their tests and reason(s) for their admission have been discussed with pt and/or available family. They convey agreement and understanding for the need to be admitted and for admission diagnosis. PLAN:  1. Admit to    Diagnosis     Clinical Impression:   1.  NSTEMI (non-ST elevated myocardial infarction) [facility])        Attestations: This note is prepared by Valentino Carolin, acting as a Scribe for Rhett Benjamin MD.    Rhett Benjamin MD: The scribe's documentation has been prepared under my direction and personally reviewed by me in its entirety. I confirm that the notes above accurately reflects all work, treatment, procedures, and medical decision making performed by me.

## 2018-08-16 NOTE — H&P
Admission    NAME: Nabil Gaspar   :     MRN:  [id number]     Date/Time:  2018 3:22 PM    Patient PCP: Chrissy Hunter MD  ________________________________________________________________________     Assessment:     1. Chest pain with increased troponin, consistent with NSTEMI  2. CAD with cath 2018 with PCI of mid LAD with 2.25 x 20 synergy  3. ICM EF 45%  4. Sinus with LBBB  5. HTN  6. DM  7. Dyslipidemia intolerant of statin  8. Hx of DVT  9. Quit tobacco in   10. , three kids, retired, ADLs, likes to [employer]:     Had been doing well until today. Was out shooting and developed chest pain, called EMS. He notes INR was 3.1 last week, today was 1.4. Thinks he missed a dose of warfarin. Chest pain with increased troponin, suggestive of NSTEMI. Euvolemic  LBBB    Cath. All risk/benefits/alternatives discussed with patient and agrees to proceed. 1. Hold warfarin for procedure today  2. Add back aspirin  3. Cont plavix  4. Cont toprol xl  5. Resume telmisartan/amlod on discharge  6. Hydration for cath  7. Insulin sliding scale          [x]        High complexity decision making was performed        Subjective:   CHIEF COMPLAINT: Chest pain    HISTORY OF PRESENT ILLNESS:       Felt better after PCI. Stopped aspirin after one month as instructed. His protimes have been ok, except for today. Thinks he may have missed one dose of coumadin. Was well until today. Was shooting [employer] and started to note his heart pounding. Came home and had lunch, then started to note chest pain going across his shoulders. +nausea. +vomiting. Wife called EMS. Given asa and sl nitro with relief of symptoms. In ED troponin elevated. We were asked to admit for work up and evaluation of the above problems.      Past Medical History:   Diagnosis Date    Arthritis     Back injury 12    CAD (coronary artery disease)     Stent replacement    Diabetes ([facility] [address]  GERD (gastroesophageal reflux disease)     Hypertension     Muscle cramps     PUD (peptic ulcer disease)     Reflux     Thromboembolus ([facility])     L BLE      Past Surgical History:   Procedure Laterality Date    HX ORTHOPAEDIC Left     Crushed/left index finger amputee    HX ORTHOPAEDIC      heal spur removed left foot    HX OTHER SURGICAL      hand surgery, heal spur L    UPPER GI ENDOSCOPY,BIOPSY  5/16/2018         UPPER GI ENDOSCOPY,DILATN W GUIDE  5/16/2018          Allergies   Allergen Reactions    Statins-Hmg-Coa Reductase Inhibitors Myalgia     Tried several statins. Meds:  See below  Social History   Substance Use Topics    Smoking status: Former Smoker     Packs/day: 1.00     Quit date: 1/1/1975    Smokeless tobacco: Never Used    Alcohol use Yes      Comment: once in a while      Family History   Problem Relation Age of Onset    Heart Disease Brother     Bleeding Prob Father     Tuberculosis Sister        REVIEW OF SYSTEMS:     []         Unable to obtain  ROS due to ---   [x]         Total of 12 systems reviewed as follows:                             Total of 12 systems reviewed as follows:       POSITIVE= Bold text  Negative = normal text  General:  fever, chills, sweats, generalized weakness, weight loss/gain,      loss of appetite   Eyes:    blurred vision, eye pain, loss of vision, double vision  ENT:    rhinorrhea, pharyngitis   Respiratory:   cough, sputum production, SOB, DIAZ, wheezing, pleuritic pain   Cardiology:   chest pain, palpitations, orthopnea, PND, edema, syncope   Gastrointestinal:  abdominal pain , N/V, diarrhea, dysphagia, constipation, bleeding   Genitourinary:  frequency, urgency, dysuria, hematuria, incontinence   Muskuloskeletal :  arthralgia, myalgia, back pain  Hematology:  easy bruising, nose or gum bleeding, lymphadenopathy   Dermatological: rash, ulceration, pruritis, color change / jaundice  Endocrine:   hot flashes or polydipsia   Neurological: headache, dizziness, confusion, focal weakness, paresthesia,     Speech difficulties, memory loss, gait difficulty  Psychological: Feelings of anxiety, depression, agitation    Objective:      Physical Exam:    Last 24hrs VS reviewed since prior progress note. Most recent are:    Visit Vitals    /68    Pulse 74    Temp 98.1 °F (36.7 °C)    Resp 15    Ht 5' 11\" (1.803 m)    Wt 128.4 kg (283 lb)    SpO2 96%    BMI 39.47 kg/m2     No intake or output data in the 24 hours ending 08/16/18 1522     General Appearance: Well developed, morbid obese, alert & oriented x 3,    no acute distress. Ears/Nose/Mouth/Throat: Pupils equal and round, Hearing grossly normal.  Neck: Supple. JVP within normal limits. Carotids good upstrokes, with no bruit. Chest: Lungs clear to auscultation bilaterally. Cardiovascular: Regular rate and rhythm, S1S2 normal, no murmur, rubs, gallops. Abdomen: Soft, non-tender, bowel sounds are active. No organomegaly. Extremities: No edema bilaterally. Femoral pulses +2, Distal Pulses +1. Skin: Warm and dry. Neuro: CN II-XII grossly intact, Strength and sensation grossly intact. Data:      Prior to Admission medications    Medication Sig Start Date End Date Taking? Authorizing Provider   finasteride (PROSCAR) 5 mg tablet TAKE 1 TABLET DAILY FOR SYMPTOMATIC BENIGN PROSTATIC HYPERPLASIA 8/15/18  Yes Sherly Gómez MD   diclofenac (VOLTAREN) 1 % gel Apply  to affected area four (4) times daily. 8/15/18  Yes Sherly Gómez MD   clopidogrel (PLAVIX) 75 mg tab Take 1 Tab by mouth daily. 6/16/18  Yes Nayla Hamilton III, DO   metoprolol succinate (TOPROL-XL) 25 mg XL tablet Take 0.5 Tabs by mouth daily. 6/16/18  Yes Christophe Hamilton III, DO   metFORMIN (GLUCOPHAGE) 1,000 mg tablet Take 1 Tab by mouth two (2) times a day. 6/15/18  Yes Sherly Gómez MD   lansoprazole (PREVACID) 30 mg capsule Take 1 Cap by mouth Daily (before breakfast).  6/15/18  Yes Sherly Gómez MD Telmisartan-amLODIPine 80-5 mg tab TAKE 1 TABLET NIGHTLY FOR BLOOD PRESSURE 5/27/18  Yes Karthik White MD   ALTAGRACIA PEN NEEDLE 32 gauge x 5/32\" ndle USE 1 NEEDLE UNDER THE SKIN DAILY 2/14/18  Yes Flor Kim MD   exenatide microspheres (BYDUREON) 2 mg/0.65 mL pnij 2 mg by SubCUTAneous route every seven (7) days. For diabetes 11/13/17  Yes Karthik White MD   insulin glargine (LANTUS SOLOSTAR) 100 unit/mL (3 mL) inpn 55 Units by SubCUTAneous route daily. 11/13/17  Yes Karthik White MD   COUMADIN 4 mg tablet TAKE 2 TABLETS DAILY 11/13/17  Yes Karthik White MD   cholecalciferol, VITAMIN D3, (VITAMIN D3) 5,000 unit tab tablet Take  by mouth daily. Nature Made   Yes Historical Provider   cyanocobalamin (VITAMIN B-12) 1,000 mcg tablet Take 1,000 mcg by mouth daily. Nature Made   Yes Historical Provider   magnesium oxide (MAG-OX) 400 mg tablet Take 1 Tab by mouth four (4) times daily. For low magnesium  Patient taking differently: Take 1,600 mg by mouth daily. For low magnesium 4/5/15  Yes Flor Kim MD   cyclobenzaprine (FLEXERIL) 10 mg tablet Take  by mouth three (3) times daily as needed for Muscle Spasm(s). Yes Historical Provider   topiramate (TOPAMAX) 25 mg tablet Take 1 Tab by mouth two (2) times a day. 8/15/18   Karthik White MD   cetirizine (ZYRTEC) 10 mg tablet Take 1 Tab by mouth nightly. Indications: SEASONAL ALLERGIC RHINITIS 6/21/18   Karthik White MD   aspirin 81 mg chewable tablet Take 1 Tab by mouth daily.  6/16/18   Adriane Field III, DO       Recent Results (from the past 24 hour(s))   EKG, 12 LEAD, INITIAL    Collection Time: 08/16/18  1:27 PM   Result Value Ref Range    Ventricular Rate 88 BPM    Atrial Rate 88 BPM    P-R Interval 220 ms    QRS Duration 142 ms    Q-T Interval 388 ms    QTC Calculation (Bezet) 469 ms    Calculated P Axis 40 degrees    Calculated R Axis -52 degrees    Calculated T Axis 72 degrees    Diagnosis       Sinus rhythm with 1st degree AV block with premature atrial complexes with   aberrant conduction  Left axis deviation  Left bundle branch block  No previous ECGs available     CBC WITH AUTOMATED DIFF    Collection Time: 08/16/18  1:36 PM   Result Value Ref Range    WBC 7.7 4.1 - 11.1 K/uL    RBC 4.02 (L) 4.10 - 5.70 M/uL    HGB 12.5 12.1 - 17.0 g/dL    HCT 35.4 (L) 36.6 - 50.3 %    MCV 88.1 80.0 - 99.0 FL    MCH 31.1 26.0 - 34.0 PG    MCHC 35.3 30.0 - 36.5 g/dL    RDW 14.0 11.5 - 14.5 %    PLATELET 842 888 - 685 K/uL    MPV 8.7 (L) 8.9 - 12.9 FL    NRBC 0.0 0  WBC    ABSOLUTE NRBC 0.00 0.00 - 0.01 K/uL    NEUTROPHILS 63 32 - 75 %    LYMPHOCYTES 25 12 - 49 %    MONOCYTES 9 5 - 13 %    EOSINOPHILS 2 0 - 7 %    BASOPHILS 1 0 - 1 %    IMMATURE GRANULOCYTES 0 0.0 - 0.5 %    ABS. NEUTROPHILS 4.8 1.8 - 8.0 K/UL    ABS. LYMPHOCYTES 2.0 0.8 - 3.5 K/UL    ABS. MONOCYTES 0.7 0.0 - 1.0 K/UL    ABS. EOSINOPHILS 0.2 0.0 - 0.4 K/UL    ABS. BASOPHILS 0.0 0.0 - 0.1 K/UL    ABS. IMM. GRANS. 0.0 0.00 - 0.04 K/UL    DF AUTOMATED     METABOLIC PANEL, COMPREHENSIVE    Collection Time: 08/16/18  1:36 PM   Result Value Ref Range    Sodium 135 (L) 136 - 145 mmol/L    Potassium 4.2 3.5 - 5.1 mmol/L    Chloride 105 97 - 108 mmol/L    CO2 20 (L) 21 - 32 mmol/L    Anion gap 10 5 - 15 mmol/L    Glucose 141 (H) 65 - 100 mg/dL    BUN 18 6 - 20 MG/DL    Creatinine 1.35 (H) 0.70 - 1.30 MG/DL    BUN/Creatinine ratio 13 12 - 20      GFR est AA >60 >60 ml/min/1.73m2    GFR est non-AA 50 (L) >60 ml/min/1.73m2    Calcium 9.3 8.5 - 10.1 MG/DL    Bilirubin, total 0.6 0.2 - 1.0 MG/DL    ALT (SGPT) 27 12 - 78 U/L    AST (SGOT) 45 (H) 15 - 37 U/L    Alk.  phosphatase 69 45 - 117 U/L    Protein, total 7.6 6.4 - 8.2 g/dL    Albumin 3.6 3.5 - 5.0 g/dL    Globulin 4.0 2.0 - 4.0 g/dL    A-G Ratio 0.9 (L) 1.1 - 2.2     TROPONIN I    Collection Time: 08/16/18  1:36 PM   Result Value Ref Range    Troponin-I, Qt. 0.33 (H) <0.05 ng/mL   PROTHROMBIN TIME + INR    Collection Time: 08/16/18  1:36 PM Result Value Ref Range    INR 1.4 (H) 0.9 - 1.1      Prothrombin time 14.1 (H) 9.0 - 11.1 sec

## 2018-08-16 NOTE — ED NOTES
Patient troponin elevated at this time. ER MD Nowak made aware. Ashlee Gary reports that he will be consulting cardiology for patient.

## 2018-08-16 NOTE — ED TRIAGE NOTES
Patient arrived EMS, EMS reports patient chief complaint was chest pain with N/V on arrival. EMS reported giving 324mg of Aspirin with 2 sublingual nitroglycerin, vital signs WNL in route. Past cardiac history stent placed 3 months ago. Patient was 8/10 pain in route to ED [facility] ED. Patient reports pain has resolved at this time. ER MD at bedside evaluating patient with RNs x2 at this time.

## 2018-08-16 NOTE — ED NOTES
Bedside shift change report given to Les Prieto RN  (oncoming nurse) by Hannah Mixon RN  (offgoing nurse). Report included the following information SBAR, Kardex, ED Summary and Recent Results.

## 2018-08-16 NOTE — PROGRESS NOTES
Pharmacy Clarification of Prior to Admission Medication Regimen     The patient was interviewed regarding clarification of the prior to admission medication regimen and was questioned regarding use of any other inhalers, topical products, over the counter medications, herbal medications, vitamin products or ophthalmic/nasal/otic medication use. Information Obtained From: Patient, med list, RX Query    Pertinent Pharmacy Findings:   topiramate (TOPAMAX) 25 mg tablet: Patient was prescribed this agent 8/15/18. Patient has not received this agent from his mail order pharmacy as of 18.  Identified High Alert Medication Information  o Current Anticoagulants:  - Name: warfarin (COUMADIN) 4 mg tablet    PTA medication list was corrected to the following:     Prior to Admission Medications   Prescriptions Last Dose Informant Patient Reported? Taking? Telmisartan-amLODIPine 80-5 mg tab 2018 at Unknown time Self No Yes   Sig: TAKE 1 TABLET NIGHTLY FOR BLOOD PRESSURE   cholecalciferol, VITAMIN D3, (VITAMIN D3) 5,000 unit tab tablet 2018 at Unknown time Self Yes Yes   Sig: Take 1,000 Units by mouth daily. Nature Made    clopidogrel (PLAVIX) 75 mg tab 2018 at Unknown time Self No Yes   Sig: Take 1 Tab by mouth daily. cyanocobalamin (VITAMIN B-12) 1,000 mcg tablet 2018 at Unknown time Self Yes Yes   Sig: Take 1,000 mcg by mouth daily. Nature Made   cyclobenzaprine (FLEXERIL) 10 mg tablet 2018 at Unknown time Self Yes Yes   Sig: Take 10 mg by mouth three (3) times daily as needed for Muscle Spasm(s). diclofenac (VOLTAREN) 1 % gel 8/15/2018 at Unknown time Self No Yes   Sig: Apply  to affected area four (4) times daily. exenatide microspheres (BYDUREON) 2 mg/0.65 mL pnij 8/15/2018 at Unknown time Self No Yes   Si mg by SubCUTAneous route every seven (7) days. For diabetes   Patient taking differently: 2 mg by SubCUTAneous route every seven (7) days.  For diabetes  Indications:    finasteride (PROSCAR) 5 mg tablet 2018 at Unknown time Self No Yes   Sig: TAKE 1 TABLET DAILY FOR SYMPTOMATIC BENIGN PROSTATIC HYPERPLASIA   insulin glargine (LANTUS SOLOSTAR) 100 unit/mL (3 mL) inpn 2018 at Unknown time Self No Yes   Si Units by SubCUTAneous route daily. lansoprazole (PREVACID) 30 mg capsule 2018 at Unknown time Self No Yes   Sig: Take 1 Cap by mouth Daily (before breakfast). magnesium oxide (MAG-OX) 400 mg tablet 2018 at Unknown time Self Yes Yes   Sig: Take 800 mg by mouth two (2) times a day. metFORMIN (GLUCOPHAGE) 1,000 mg tablet 2018 at Unknown time Self No Yes   Sig: Take 1 Tab by mouth two (2) times a day. metoprolol succinate (TOPROL-XL) 25 mg XL tablet 2018 at Unknown time Self No Yes   Sig: Take 0.5 Tabs by mouth daily. topiramate (TOPAMAX) 25 mg tablet Not Taking at Unknown time Self No No   Sig: Take 1 Tab by mouth two (2) times a day. warfarin (COUMADIN) 4 mg tablet 8/15/2018 at Unknown time Self Yes Yes   Sig: Take 8 mg by mouth every evening.       Facility-Administered Medications: None          Thank you,  Rogerio Miranda CPhT  Medication History Pharmacy Technician

## 2018-08-16 NOTE — IP AVS SNAPSHOT
[address] [facility] 
[phone] Patient: Nhi Couch MRN: [id number] About your hospitalization You were admitted on:  2018 You last received care in the:  MRM 2 INTRVNTNL CARDIO You were discharged on:  2018 Why you were hospitalized Your primary diagnosis was:  Not on File Your diagnoses also included:  Nstemi (Non-St Elevated Myocardial Infarction) (Hcc) Follow-up Information Follow up With Details Comments Contact Info Cindie Nyhan III, DO On 2018 2;45pm  Cardiology follow-up [address] [facility] 
[phone] Romy Frank MD On 2018 11;10am  Hospital follow-up [address] [id number] 
[phone] Your Scheduled Appointments 2018  9:50 AM EDT Follow Up with Cristy Bello MD  
[facility] Diabetes and Endocrinology [facility]) [facility] [address] [address] [phone] 2018 11:10 AM EDT TRANSITIONAL CARE MANAGEMENT with Romy Frank MD  
[address] at [facility]) [address] [place] [address] [address] [facility]  
[phone] Discharge Orders None A check davina indicates which time of day the medication should be taken. My Medications START taking these medications Instructions Each Dose to Equal  
 Morning Noon Evening Bedtime  
 aspirin 81 mg chewable tablet Your last dose was: Your next dose is: Take 1 Tab by mouth daily. 81 mg CHANGE how you take these medications Instructions Each Dose to Equal  
 Morning Noon Evening Bedtime  
 exenatide microspheres 2 mg/0.65 mL Pnij Commonly known as:  BYDUREON  
 What changed:  additional instructions Your last dose was: Your next dose is:    
   
   
 2 mg by SubCUTAneous route every seven (7) days. For diabetes 2 mg  
    
   
   
   
  
 magnesium oxide 400 mg tablet Commonly known as:  MAG-OX What changed:  Another medication with the same name was removed. Continue taking this medication, and follow the directions you see here. Your last dose was: Your next dose is: Take 800 mg by mouth two (2) times a day. 800 mg  
    
   
   
   
  
 metoprolol succinate 25 mg XL tablet Commonly known as:  TOPROL-XL What changed:  how much to take Your last dose was: Your next dose is: Take 1 Tab by mouth daily. 25 mg  
    
   
   
   
  
 warfarin 4 mg tablet Commonly known as:  COUMADIN What changed:  Another medication with the same name was removed. Continue taking this medication, and follow the directions you see here. Your last dose was: Your next dose is: Take 8 mg by mouth every evening. 8 mg CONTINUE taking these medications Instructions Each Dose to Equal  
 Morning Noon Evening Bedtime  
 clopidogrel 75 mg Tab Commonly known as:  PLAVIX Your last dose was: Your next dose is: Take 1 Tab by mouth daily. 75 mg  
    
   
   
   
  
 diclofenac 1 % Gel Commonly known as:  VOLTAREN Your last dose was: Your next dose is:    
   
   
 Apply  to affected area four (4) times daily. 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 10 mg  
    
   
   
   
  
 insulin glargine 100 unit/mL (3 mL) Inpn Commonly known as:  LANTUS SOLOSTAR U-100 INSULIN Your last dose was: Your next dose is:    
   
   
 55 Units by SubCUTAneous route daily. 55 Units  
    
   
   
   
  
 lansoprazole 30 mg capsule Commonly known as:  PREVACID Your last dose was: Your next dose is: Take 1 Cap by mouth Daily (before breakfast). 30 mg  
    
   
   
   
  
 metFORMIN 1,000 mg tablet Commonly known as:  GLUCOPHAGE Your last dose was: Your next dose is: Take 1 Tab by mouth two (2) times a day. 1000 mg Telmisartan-amLODIPine 80-5 mg Tab Your last dose was: Your next dose is: TAKE 1 TABLET NIGHTLY FOR BLOOD PRESSURE  
     
   
   
   
  
 VITAMIN B-12 1,000 mcg tablet Generic drug:  cyanocobalamin Your last dose was: Your next dose is: Take 1,000 mcg by mouth daily. Nature Made  
 1000 mcg VITAMIN D3 5,000 unit Tab tablet Generic drug:  cholecalciferol (VITAMIN D3) Your last dose was: Your next dose is: Take 1,000 Units by mouth daily. Nature Made  
 1000 Units Where to Get Your Medications Information on where to get these meds will be given to you by the nurse or doctor. ! Ask your nurse or doctor about these medications  
  aspirin 81 mg chewable tablet  
 metoprolol succinate 25 mg XL tablet Discharge Instructions [address] [facility], [address]    [phone] [address]    [email or web address] Patient Discharge Instructions Yara Watkins / [id number] : 1934 Admitted 2018 Discharged: 2018 · It is important that you take the medication exactly as they are prescribed. · Keep your medication in the bottles provided by the pharmacist and keep a list of the medication names, dosages, and times to be taken in your wallet. · Do not take other medications without consulting your doctor. BRING ALL OF YOUR MEDICINES TO YOUR OFFICE VISIT. Follow-up with Dr. Luís Melo in 2 weeks. Follow-up with your primary care physician in one week. Heart Attack: After Your Hospitalization Your Care Instructions A heart attack (myocardial infarction, or MI) occurs when one or more of the coronary arteries, which supply the heart with oxygen-rich blood, is blocked. A blockage usually occurs when plaque inside the artery breaks open and a blood clot forms in the artery. After a heart attack, you may be worried about your future. Over the next several weeks, your heart will start to heal. Although it is sometimes hard to break old habits, you can prevent another heart attack by making some lifestyle changes and by taking medicines. You may use the following information for ideas about what to do at home to speed your recovery. Follow-up care is a key part of your treatment and safety. Be sure to make and go to all appointments, and call your doctor if you are having problems. It is also a good idea to keep a list of the medicines you take, including dose. How can you care for yourself at home? Activity Increase your activities slowly. Take short rest breaks when you get tired. As you are able, get more exercise. Walking is a good choice. Bit by bit, increase the amount you walk every day. Try for at least 30 minutes on most days of the week. You also may want to swim, bike, or do other activities. Cardiac rehabilitation (rehab) program is strongly recommended. Cardiac rehab includes supervised exercise, help with diet and lifestyle changes, and emotional support. It may reduce your risk of future heart problems. Do not drive until your doctor says you can. You can have sex when you are strong enough. This usually means when you can easily walk around or climb stairs. Talk with your doctor if you have any concerns. Do not take sildenafil citrate (Viagra), tadalafil (Cialis), or vardenafil (Levitra) if you are taking nitroglycerin. Lifestyle changes Do not smoke. Smoking increases your risk of another heart attack. If you need help quitting, talk to your doctor about stop-smoking programs and medicines. These can increase your chances of quitting for good. Eat a heart-healthy diet that is low in cholesterol, saturated fat, and salt, and is full of fruits, vegetables and whole-grains. Eat at least two servings of fish each week. You may get more details about how to eat healthy, but these tips can help you get started. Our website has more information:  [email or web address] Avoid colds and flu. Get a pneumococcal vaccine shot. If you have had one before, ask your primary care doctor whether you need a second dose. Get a flu shot every fall. If you must be around people with colds or flu, wash your hands often. Medicines Take your medicines exactly as prescribed. Call your doctor if you think you are having a problem with your medicine. You may need several medicines. Angiotensin-converting enzyme (ACE) inhibitors, beta-blockers, and statins can help prevent another heart attack. ACE inhibitors control your blood pressure, and statins help lower cholesterol. Aspirin and other blood thinners help prevent blood clots. Blood clots can cause a stroke or heart attack. If Plavix is prescribed, you must take it to prevent your stent from clotting. You will likely need the plavix for at least 1 to 2 years. If your doctor has given you nitroglycerin, keep it with you at all times. If you have chest pain, sit down and rest, and take the first dose of nitroglycerin if the discomfort does not resolve.  If chest pain gets worse or is not getting better within 5 minutes, call 911 immediately. Stay on the phone with the emergency ; he or she will give you further instructions. Be sure to tell your doctor about any chest pain you have had, even if it went away. Do not take any over-the-counter medicines, vitamins, or herbal products without talking to your doctor first. 
 
Mental health Talk to your family, friends, or a counselor about your feelings. It is normal to feel frightened, angry, hopeless, helpless, and even guilty. Talking openly about bad feelings can help you cope. If the blues last, talk to your primary care doctor. When should you call for help? Call 911 anytime you think you may need emergency care. For example, call if: 
You have signs of a heart attack. These may include: 
Chest pain or pressure. Sweating. Shortness of breath. Nausea or vomiting. Pain that spreads from the chest to the neck, jaw, or one or both shoulders or arms. Dizziness or lightheadedness. A fast or irregular pulse. After calling 911, chew 1 adult-strength aspirin. Wait for an ambulance. Do not try to drive yourself. You passed out (lost consciousness). You feel like you are having another heart attack. Call your doctor now or seek immediate medical care if: 
You have had any chest pain, even if it has gone away. You have new or increased shortness of breath. You are dizzy or lightheaded, or you feel like you may faint. Watch closely for changes in your health, and be sure to contact your doctor if you have any problems, including gaining 5 pounds or more. Cardiac Catheterization  Discharge Instructions ? You may take a shower. Be sure to get the dressing wet and then remove it; gently wash the area with warm soapy water. Pat dry and leave open to air. To help prevent infections, be sure to keep the cath site clean and dry.   No lotions, creams, powders, ointments, etc. in the cath site for approximately 1 week. ? Do not take a tub bath, get in a hot tub or swimming pool for approximately 5 days or until the cath site is completely healed. ? No strenuous activity or heavy lifting over 10 lbs. for 7 days. ? After your cath, some bruising or discomfort is common during the healing process. Tylenol, 1-2 tablets every 6 hours as needed, is recommended if you experience any discomfort. If you experience any signs or symptoms of infection such as fever, chills, or poorly healing incision, persistent tenderness or swelling in the groin, redness and/or warmth to the touch, numbness, significant tingling or pain at the groin site or affected extremity, rash, drainage from the cath site, or if the leg feels tight or swollen, call your physician right away. ? If bleeding at the cath site occurs, take a clean gauze pad and apply direct pressure to the groin just above the puncture site. Call 911 immediately, and continue to apply direct pressure until an ambulance gets to your location. Information obtained by : 
I understand that if any problems occur once I am at home I am to contact my physician. I understand and acknowledge receipt of the instructions indicated above. R.N.'s Signature                                                                  Date/Time Patient or Representative Signature                                                          Date/Time Selina Looney MD 
 
 
Where can you learn more? Go to [email or web address] [address] [facility], [address] [phone] [facility] [address] [facility]    [email or web address] Introducing [facility]! [employer] introduces Es[employer] Networkst patient portal. Now you can access parts of your medical record, email your doctor's office, and request medication refills online. 1. In your internet browser, go to [email or web address] 2. Click on the First Time User? Click Here link in the Sign In box. You will see the New Member Sign Up page. 3. Enter your Last Size Access Code exactly as it appears below. You will not need to use this code after youve completed the sign-up process. If you do not sign up before the expiration date, you must request a new code. · Last Size Access Code: [email or web address] Expires: 10/13/2018  5:20 AM 
 
4. Enter the last four digits of your Social Security Number (xxxx) and Date of Birth (mm/dd/yyyy) as indicated and click Submit. You will be taken to the next sign-up page. 5. Create a Last Size ID. This will be your Last Size login ID and cannot be changed, so think of one that is secure and easy to remember. 6. Create a Last Size password. You can change your password at any time. 7. Enter your Password Reset Question and Answer. This can be used at a later time if you forget your password. 8. Enter your e-mail address. You will receive e-mail notification when new information is available in [address] 9. Click Sign Up. You can now view and download portions of your medical record. 10. Click the Download Summary menu link to download a portable copy of your medical information. If you have questions, please visit the Frequently Asked Questions section of the Last Size website. Remember, Last Size is NOT to be used for urgent needs. For medical emergencies, dial 911. Now available from your iPhone and Android! Introducing Volodymyr Quijano As a [employer] patient, I wanted to make you aware of our electronic visit tool called Volodymyr Quijano. [employer] 24/7 allows you to connect within minutes with a medical provider 24 hours a day, seven days a week via a mobile device or tablet or logging into a secure website from your computer. You can access Volodymyr Orlandofin from anywhere in the [place]. A virtual visit might be right for you when you have a simple condition and feel like you just dont want to get out of bed, or cant get away from work for an appointment, when your regular Alicia Haq provider is not available (evenings, weekends or holidays), or when youre out of town and need minor care. Electronic visits cost only $49 and if the Alicia Haq 24/7 provider determines a prescription is needed to treat your condition, one can be electronically transmitted to a nearby pharmacy*. Please take a moment to enroll today if you have not already done so. The enrollment process is free and takes just a few minutes. To enroll, please download the Alicia Eun 24/[employer] branden to your tablet or phone, or visit [email or web address] to enroll on your computer. And, as an [address] [place] [address] patient with a [employer] account, the results of your visits will be scanned into your electronic medical record and your primary care provider will be able to view the scanned results. We urge you to continue to see your regular Alicia Haq provider for your ongoing medical care. And while your primary care provider may not be the one available when you seek a Volodymyr Agustinestebanfin virtual visit, the peace of mind you get from getting a real diagnosis real time can be priceless. For more information on Volodymyr TextMasterestebanfin, view our Frequently Asked Questions (FAQs) at [email or web address]. Sincerely, 
 
Nellie Sy MD 
Chief Medical Officer Frances Hagan *:  certain medications cannot be prescribed via [employer] Unresulted Labs-Please follow up with your PCP about these lab tests Order Current Status EKG, 12 LEAD, SUBSEQUENT Preliminary result Providers Seen During Your Hospitalization Provider Specialty Primary office phone Nunu Miller MD Emergency Medicine [phone] Kevin Paula MD Cardiology [phone] Your Primary Care Physician (PCP) Primary Care Physician Office Phone Office Fax Tee Garibay [phone] You are allergic to the following Allergen Reactions Statins-Hmg-Coa Reductase Inhibitors Myalgia Tried several statins. Recent Documentation Height Weight BMI Smoking Status 1.803 m 125.6 kg 38.62 kg/m2 Former Smoker Emergency Contacts Name Discharge Info Relation Home Work Mobile Caity Paiz DISCHARGE CAREGIVER [3] Spouse [3] [phone] Caity Paiz  Parent [1] [phone] Patient Belongings The following personal items are in your possession at time of discharge: 
  Dental Appliances: Lowers, Uppers  Visual Aid: Glasses      Home Medications: None   Jewelry: None  Clothing: At bedside    Other Valuables: None Please provide this summary of care documentation to your next provider. Signatures-by signing, you are acknowledging that this After Visit Summary has been reviewed with you and you have received a copy. Patient Signature:  ____________________________________________________________ Date:  ____________________________________________________________  
  
Rocio Echevarria Provider Signature:  ____________________________________________________________ Date:  ____________________________________________________________

## 2018-08-16 NOTE — ED NOTES
Patient's wife called, Jaja Amaral, [phone]. Would like to be contacted with update on if patient is to be admitted or discharged.  She was updated by Matilde Kendrick RN

## 2018-08-17 ENCOUNTER — APPOINTMENT (OUTPATIENT)
Dept: NUCLEAR MEDICINE | Age: 83
DRG: 282 | End: 2018-08-17
Attending: INTERNAL MEDICINE
Payer: MEDICARE

## 2018-08-17 LAB
ANION GAP SERPL CALC-SCNC: 8 MMOL/L (ref 5–15)
BASOPHILS # BLD: 0.1 K/UL (ref 0–0.1)
BASOPHILS NFR BLD: 1 % (ref 0–1)
BUN SERPL-MCNC: 16 MG/DL (ref 6–20)
BUN/CREAT SERPL: 15 (ref 12–20)
CALCIUM SERPL-MCNC: 9 MG/DL (ref 8.5–10.1)
CHLORIDE SERPL-SCNC: 106 MMOL/L (ref 97–108)
CHOLEST SERPL-MCNC: 180 MG/DL
CK SERPL-CCNC: 197 U/L (ref 39–308)
CO2 SERPL-SCNC: 23 MMOL/L (ref 21–32)
CREAT SERPL-MCNC: 1.06 MG/DL (ref 0.7–1.3)
DIFFERENTIAL METHOD BLD: ABNORMAL
EOSINOPHIL # BLD: 0.2 K/UL (ref 0–0.4)
EOSINOPHIL NFR BLD: 3 % (ref 0–7)
ERYTHROCYTE [DISTWIDTH] IN BLOOD BY AUTOMATED COUNT: 14.3 % (ref 11.5–14.5)
EST. AVERAGE GLUCOSE BLD GHB EST-MCNC: 137 MG/DL
GLUCOSE BLD STRIP.AUTO-MCNC: 114 MG/DL (ref 65–100)
GLUCOSE BLD STRIP.AUTO-MCNC: 176 MG/DL (ref 65–100)
GLUCOSE BLD STRIP.AUTO-MCNC: 189 MG/DL (ref 65–100)
GLUCOSE BLD STRIP.AUTO-MCNC: 215 MG/DL (ref 65–100)
GLUCOSE SERPL-MCNC: 134 MG/DL (ref 65–100)
HBA1C MFR BLD: 6.4 % (ref 4.2–6.3)
HCT VFR BLD AUTO: 34.5 % (ref 36.6–50.3)
HDLC SERPL-MCNC: 33 MG/DL
HDLC SERPL: 5.5 {RATIO} (ref 0–5)
HGB BLD-MCNC: 12.2 G/DL (ref 12.1–17)
IMM GRANULOCYTES # BLD: 0 K/UL (ref 0–0.04)
IMM GRANULOCYTES NFR BLD AUTO: 0 % (ref 0–0.5)
LDLC SERPL CALC-MCNC: 111.2 MG/DL (ref 0–100)
LIPID PROFILE,FLP: ABNORMAL
LYMPHOCYTES # BLD: 2.4 K/UL (ref 0.8–3.5)
LYMPHOCYTES NFR BLD: 32 % (ref 12–49)
MAGNESIUM SERPL-MCNC: 1.6 MG/DL (ref 1.6–2.4)
MCH RBC QN AUTO: 31.6 PG (ref 26–34)
MCHC RBC AUTO-ENTMCNC: 35.4 G/DL (ref 30–36.5)
MCV RBC AUTO: 89.4 FL (ref 80–99)
MONOCYTES # BLD: 0.7 K/UL (ref 0–1)
MONOCYTES NFR BLD: 9 % (ref 5–13)
NEUTS SEG # BLD: 4.1 K/UL (ref 1.8–8)
NEUTS SEG NFR BLD: 55 % (ref 32–75)
NRBC # BLD: 0 K/UL (ref 0–0.01)
NRBC BLD-RTO: 0 PER 100 WBC
PLATELET # BLD AUTO: 279 K/UL (ref 150–400)
PMV BLD AUTO: 9.1 FL (ref 8.9–12.9)
POTASSIUM SERPL-SCNC: 4.5 MMOL/L (ref 3.5–5.1)
RBC # BLD AUTO: 3.86 M/UL (ref 4.1–5.7)
SERVICE CMNT-IMP: ABNORMAL
SODIUM SERPL-SCNC: 137 MMOL/L (ref 136–145)
TRIGL SERPL-MCNC: 179 MG/DL (ref ?–150)
TROPONIN I SERPL-MCNC: 12 NG/ML
TSH SERPL DL<=0.05 MIU/L-ACNC: 2.74 UIU/ML (ref 0.36–3.74)
VLDLC SERPL CALC-MCNC: 35.8 MG/DL
WBC # BLD AUTO: 7.5 K/UL (ref 4.1–11.1)

## 2018-08-17 PROCEDURE — 36415 COLL VENOUS BLD VENIPUNCTURE: CPT | Performed by: INTERNAL MEDICINE

## 2018-08-17 PROCEDURE — 82550 ASSAY OF CK (CPK): CPT | Performed by: INTERNAL MEDICINE

## 2018-08-17 PROCEDURE — 65660000000 HC RM CCU STEPDOWN

## 2018-08-17 PROCEDURE — 93005 ELECTROCARDIOGRAM TRACING: CPT

## 2018-08-17 PROCEDURE — A9540 TC99M MAA: HCPCS

## 2018-08-17 PROCEDURE — 84443 ASSAY THYROID STIM HORMONE: CPT | Performed by: INTERNAL MEDICINE

## 2018-08-17 PROCEDURE — 85025 COMPLETE CBC W/AUTO DIFF WBC: CPT | Performed by: INTERNAL MEDICINE

## 2018-08-17 PROCEDURE — 83036 HEMOGLOBIN GLYCOSYLATED A1C: CPT | Performed by: INTERNAL MEDICINE

## 2018-08-17 PROCEDURE — 80061 LIPID PANEL: CPT | Performed by: INTERNAL MEDICINE

## 2018-08-17 PROCEDURE — 74011250637 HC RX REV CODE- 250/637: Performed by: INTERNAL MEDICINE

## 2018-08-17 PROCEDURE — 84484 ASSAY OF TROPONIN QUANT: CPT | Performed by: INTERNAL MEDICINE

## 2018-08-17 PROCEDURE — 74011636637 HC RX REV CODE- 636/637: Performed by: INTERNAL MEDICINE

## 2018-08-17 PROCEDURE — 80048 BASIC METABOLIC PNL TOTAL CA: CPT | Performed by: INTERNAL MEDICINE

## 2018-08-17 PROCEDURE — 82962 GLUCOSE BLOOD TEST: CPT

## 2018-08-17 PROCEDURE — 83735 ASSAY OF MAGNESIUM: CPT | Performed by: INTERNAL MEDICINE

## 2018-08-17 RX ORDER — METOPROLOL SUCCINATE 25 MG/1
12.5 TABLET, EXTENDED RELEASE ORAL
Status: COMPLETED | OUTPATIENT
Start: 2018-08-17 | End: 2018-08-17

## 2018-08-17 RX ORDER — METOPROLOL SUCCINATE 25 MG/1
25 TABLET, EXTENDED RELEASE ORAL DAILY
Qty: 90 TAB | Refills: 3 | Status: SHIPPED | OUTPATIENT
Start: 2018-08-17 | End: 2019-12-02 | Stop reason: SDUPTHER

## 2018-08-17 RX ORDER — GUAIFENESIN 100 MG/5ML
81 LIQUID (ML) ORAL DAILY
Qty: 90 TAB | Refills: 1 | Status: SHIPPED | OUTPATIENT
Start: 2018-08-18

## 2018-08-17 RX ORDER — AMLODIPINE BESYLATE 5 MG/1
5 TABLET ORAL DAILY
Status: DISCONTINUED | OUTPATIENT
Start: 2018-08-17 | End: 2018-08-18 | Stop reason: HOSPADM

## 2018-08-17 RX ORDER — METOPROLOL SUCCINATE 25 MG/1
25 TABLET, EXTENDED RELEASE ORAL DAILY
Status: DISCONTINUED | OUTPATIENT
Start: 2018-08-18 | End: 2018-08-18 | Stop reason: HOSPADM

## 2018-08-17 RX ADMIN — INSULIN LISPRO 3 UNITS: 100 INJECTION, SOLUTION INTRAVENOUS; SUBCUTANEOUS at 08:25

## 2018-08-17 RX ADMIN — METOPROLOL SUCCINATE 12.5 MG: 25 TABLET, EXTENDED RELEASE ORAL at 11:21

## 2018-08-17 RX ADMIN — METOPROLOL SUCCINATE 12.5 MG: 25 TABLET, EXTENDED RELEASE ORAL at 08:24

## 2018-08-17 RX ADMIN — ACETAMINOPHEN 650 MG: 325 TABLET ORAL at 21:02

## 2018-08-17 RX ADMIN — WARFARIN SODIUM 8 MG: 5 TABLET ORAL at 18:23

## 2018-08-17 RX ADMIN — Medication 10 ML: at 15:00

## 2018-08-17 RX ADMIN — CLOPIDOGREL BISULFATE 75 MG: 75 TABLET ORAL at 08:24

## 2018-08-17 RX ADMIN — Medication 10 ML: at 21:03

## 2018-08-17 RX ADMIN — INSULIN LISPRO 4 UNITS: 100 INJECTION, SOLUTION INTRAVENOUS; SUBCUTANEOUS at 12:57

## 2018-08-17 RX ADMIN — FINASTERIDE 5 MG: 5 TABLET, FILM COATED ORAL at 08:23

## 2018-08-17 RX ADMIN — INSULIN GLARGINE 55 UNITS: 100 INJECTION, SOLUTION SUBCUTANEOUS at 08:25

## 2018-08-17 RX ADMIN — PANTOPRAZOLE SODIUM 40 MG: 40 TABLET, DELAYED RELEASE ORAL at 08:23

## 2018-08-17 RX ADMIN — AMLODIPINE BESYLATE 5 MG: 5 TABLET ORAL at 11:21

## 2018-08-17 RX ADMIN — ASPIRIN 81 MG: 81 TABLET, CHEWABLE ORAL at 08:23

## 2018-08-17 RX ADMIN — Medication 10 ML: at 06:09

## 2018-08-17 NOTE — PROGRESS NOTES
Spiritual Care Partner Volunteer visited patient in general surgery on 8/17/2018. Documented by:  Visit by: Rev. Sherly Solorzano.  Cortes Newsome MA, [facility]    Lead  Profession Development & Advancement

## 2018-08-17 NOTE — PROGRESS NOTES
Assisted pt up out of bed. Pt ambulated ot bathroom to void. Pt then ambulated in hallway with nurse. Tolerated well. Denied chest pain and shortness of breath. Pt is now back in bed. Will continue to monitor.

## 2018-08-17 NOTE — PROGRESS NOTES
Reason for Admission:   NSTEMI               RRAT Score:    32              Resources/supports as identified by patient/family:   Joseruben Cortez                Top Challenges facing patient (as identified by patient/family and CM): Finances/Medication cost?    none                Transportation?  none              Support system or lack thereof? Living arrangements? Lives with wife in a one story home with 5 steps to enter the home              Self-care/ADLs/Cognition? Patient is independent with ADL/IADL and drives          Current Advanced Directive/Advance Care Plan:                            Plan for utilizing home health:    Not at this time                      Likelihood of readmission:  Moderate to High                 Transition of Care Plan:   Patient denies past HH/Rehab and DME use. CM offered patient a [facility] visit and patient declined. Patient is expecting to be discharged tomorrow and does not have any needs or concerns at this time. CM will continue to follow. NN contacted    PCP- Dr Finn Bailey in [place] [address] Management Interventions  PCP Verified by CM: Yes (Dr Radha Damon)  Mode of Transport at Discharge:  Other (see comment) (Wife)  Transition of Care Consult (CM Consult): Discharge Planning  Discharge Durable Medical Equipment: No (no DME use)  Physical Therapy Consult: No  Occupational Therapy Consult: No  Speech Therapy Consult: No  Current Support Network: Lives with Spouse (Lives in a one story home with 5 steps to enter the home)  Confirm Follow Up Transport: Self  Plan discussed with Pt/Family/Caregiver: Yes  Discharge Location  Discharge Placement: Home      Mandie Rogers

## 2018-08-17 NOTE — DISCHARGE INSTRUCTIONS
[address] [facility], [address]    [phone]  [address]    [email or web address]    Patient Discharge Instructions    Nancie Hawley / [id number] : 1934    Admitted 2018 Discharged: 2018       · It is important that you take the medication exactly as they are prescribed. · Keep your medication in the bottles provided by the pharmacist and keep a list of the medication names, dosages, and times to be taken in your wallet. · Do not take other medications without consulting your doctor. BRING ALL OF YOUR MEDICINES TO YOUR OFFICE VISIT. Follow-up with Dr. Basil Phoenix in 2 weeks. Follow-up with your primary care physician in one week. Heart Attack: After Your Hospitalization     Your Care Instructions    A heart attack (myocardial infarction, or MI) occurs when one or more of the coronary arteries, which supply the heart with oxygen-rich blood, is blocked. A blockage usually occurs when plaque inside the artery breaks open and a blood clot forms in the artery. After a heart attack, you may be worried about your future. Over the next several weeks, your heart will start to heal. Although it is sometimes hard to break old habits, you can prevent another heart attack by making some lifestyle changes and by taking medicines. You may use the following information for ideas about what to do at home to speed your recovery. Follow-up care is a key part of your treatment and safety. Be sure to make and go to all appointments, and call your doctor if you are having problems. It is also a good idea to keep a list of the medicines you take, including dose. How can you care for yourself at home? Activity  Increase your activities slowly. Take short rest breaks when you get tired. As you are able, get more exercise. Walking is a good choice. Bit by bit, increase the amount you walk every day. Try for at least 30 minutes on most days of the week.  You also may want to swim, bike, or do other activities. Cardiac rehabilitation (rehab) program is strongly recommended. Cardiac rehab includes supervised exercise, help with diet and lifestyle changes, and emotional support. It may reduce your risk of future heart problems. Do not drive until your doctor says you can. You can have sex when you are strong enough. This usually means when you can easily walk around or climb stairs. Talk with your doctor if you have any concerns. Do not take sildenafil citrate (Viagra), tadalafil (Cialis), or vardenafil (Levitra) if you are taking nitroglycerin. Lifestyle changes  Do not smoke. Smoking increases your risk of another heart attack. If you need help quitting, talk to your doctor about stop-smoking programs and medicines. These can increase your chances of quitting for good. Eat a heart-healthy diet that is low in cholesterol, saturated fat, and salt, and is full of fruits, vegetables and whole-grains. Eat at least two servings of fish each week. You may get more details about how to eat healthy, but these tips can help you get started. Our website has more information:  [email or web address]  Avoid colds and flu. Get a pneumococcal vaccine shot. If you have had one before, ask your primary care doctor whether you need a second dose. Get a flu shot every fall. If you must be around people with colds or flu, wash your hands often. Medicines  Take your medicines exactly as prescribed. Call your doctor if you think you are having a problem with your medicine. You may need several medicines. Angiotensin-converting enzyme (ACE) inhibitors, beta-blockers, and statins can help prevent another heart attack. ACE inhibitors control your blood pressure, and statins help lower cholesterol. Aspirin and other blood thinners help prevent blood clots. Blood clots can cause a stroke or heart attack. If Plavix is prescribed, you must take it to prevent your stent from clotting.   You will likely need the plavix for at least 1 to 2 years. If your doctor has given you nitroglycerin, keep it with you at all times. If you have chest pain, sit down and rest, and take the first dose of nitroglycerin if the discomfort does not resolve. If chest pain gets worse or is not getting better within 5 minutes, call 911 immediately. Stay on the phone with the emergency ; he or she will give you further instructions. Be sure to tell your doctor about any chest pain you have had, even if it went away. Do not take any over-the-counter medicines, vitamins, or herbal products without talking to your doctor first.    Mental health  Talk to your family, friends, or a counselor about your feelings. It is normal to feel frightened, angry, hopeless, helpless, and even guilty. Talking openly about bad feelings can help you cope. If the blues last, talk to your primary care doctor. When should you call for help? Call 911 anytime you think you may need emergency care. For example, call if:  You have signs of a heart attack. These may include:  Chest pain or pressure. Sweating. Shortness of breath. Nausea or vomiting. Pain that spreads from the chest to the neck, jaw, or one or both shoulders or arms. Dizziness or lightheadedness. A fast or irregular pulse. After calling 911, chew 1 adult-strength aspirin. Wait for an ambulance. Do not try to drive yourself. You passed out (lost consciousness). You feel like you are having another heart attack. Call your doctor now or seek immediate medical care if:  You have had any chest pain, even if it has gone away. You have new or increased shortness of breath. You are dizzy or lightheaded, or you feel like you may faint. Watch closely for changes in your health, and be sure to contact your doctor if you have any problems, including gaining 5 pounds or more. Cardiac Catheterization  Discharge Instructions     You may take a shower.  Be sure to get the dressing wet and then remove it; gently wash the area with warm soapy water. Pat dry and leave open to air. To help prevent infections, be sure to keep the cath site clean and dry. No lotions, creams, powders, ointments, etc. in the cath site for approximately 1 week.  Do not take a tub bath, get in a hot tub or swimming pool for approximately 5 days or until the cath site is completely healed.  No strenuous activity or heavy lifting over 10 lbs. for 7 days.  After your cath, some bruising or discomfort is common during the healing process. Tylenol, 1-2 tablets every 6 hours as needed, is recommended if you experience any discomfort. If you experience any signs or symptoms of infection such as fever, chills, or poorly healing incision, persistent tenderness or swelling in the groin, redness and/or warmth to the touch, numbness, significant tingling or pain at the groin site or affected extremity, rash, drainage from the cath site, or if the leg feels tight or swollen, call your physician right away.  If bleeding at the cath site occurs, take a clean gauze pad and apply direct pressure to the groin just above the puncture site. Call 911 immediately, and continue to apply direct pressure until an ambulance gets to your location. Information obtained by :  I understand that if any problems occur once I am at home I am to contact my physician. I understand and acknowledge receipt of the instructions indicated above.                                                                                                                                            R.N.'s Signature                                                                  Date/Time                                                                                                                                              Patient or Representative Signature                                                          Date/Time      Orlin [facility] Kayser, MD      Where can you learn more? Go to [email or web address]            [place] [address]   [address], [address]   [phone]  [place], [address] [facility]    [email or web address]

## 2018-08-17 NOTE — PROCEDURES
[address] [facility]  MR#: [id number]  : 1934  ACCOUNT #: [de-identified]   DATE OF SERVICE: 2018    CINE NUMBER: . PROCEDURES:  1. Left heart cardiac catheterization. 2.  Left ventricular angiography. 3.  Selective coronary angiography. 4.  Sedation. Sedation was administered by cath lab staff and I supervised them as they monitored the patient for the duration of the procedure. Duration is 43 minutes. Sedation is Versed and fentanyl. INDICATION:  Chest pain and elevated troponin suggestive of non-ST elevation myocardial infarction. ESTIMATED BLOOD LOSS:  Less than 50 mL. SPECIMENS:  None. COMPLICATIONS:  None. TECHNIQUE:  A 5-Eritrean right radial artery. RESULTS:    1. Left heart cardiac catheterization:  Arterial pressure 110/82 with a mean of 92. Left ventricular pressure 110/15. Comments:  Left ventricular end-diastolic pressure is within normal limits. There is no aortic stenosis. Systemic arterial pressures are within normal limits. 2.  Left ventricular angiography in the LOPEZ projection:  The left ventricle has moderately decreased left ventricular function with an estimated ejection fraction of 35%. There is anterior wall apical and inferoapical hypokinesis. There is no significant mitral regurgitation. SELECTIVE CORONARY ANGIOGRAPHY:  Left main:  The left main is normal in caliber with mild luminal irregularities, but no significant obstruction. LEFT ANTERIOR DESCENDING:  The left anterior descending is a moderate caliber vessel. It gives rise to a small diagonal branch. The mid portion of the left anterior descending has a previously placed stent which is widely patent. Remainder of the left anterior descending and diagonal have diffuse mild luminal irregularities. LEFT CIRCUMFLEX:  The left circumflex is a moderate caliber vessel.   It gives rise to a moderate size ramus and then continues to give to 2nd obtuse marginal.  The ramus branch has a 40% to 50% ostial stenosis. The remainder of the left circumflex and its branches have diffuse mild luminal irregularities. RIGHT CORONARY ARTERY:  The right coronary artery is dominant. The right coronary is a moderate caliber vessel. It gives rise to a moderate sized RPDA. The right coronary artery and its branches have diffuse mild luminal irregularities. At the completion of the procedure hemostasis was obtained via TR band. CONCLUSION:  1. Chest pain with elevated troponin suggestive of non-ST elevation myocardial infarction. Found to have a patent LAD stent and otherwise nonobstructive coronary artery disease with an ejection fraction of 35%. 2.  Normal left ventricular filling pressures. No aortic stenosis. Systemic arterial pressure is within normal limits. 3.  Moderately decreased left ventricular function with an estimated ejection fraction of 35% and no significant mitral regurgitation. 4.  Right dominant coronary artery system with a patent mid LAD stent. There is a ramus branch which has a 40% to 50% ostial stenosis. 5.  Continue preventative care and medical management of ischemic cardiomyopathy.       Roney Heimlich, MD       SR /   D: 08/16/2018 17:11     T: 08/16/2018 23:13  JOB #: [id number]

## 2018-08-17 NOTE — PROGRESS NOTES
Bedside report received from Fan Regan RN                    Assessment, Background, Procedure summary, Intake/Output, MAR, and recent results discussed. Care assumed. Pt ambulated in hallway x3. Pt appears steady and has no complaints of pain, shortness of breath, dizziness, or light-headedness. Incision appears clean, dry, and intact with no swelling or hematoma present. Verbal report given to oncoming nurse Fan Regan RN    Report consisted of patients Situation, Background, Assessment, and   Recommendations    Information was reviewed with the receiving nurse. Opportunity for questions and clarification was provided.       Jose David Díaz RN

## 2018-08-17 NOTE — CARDIO/PULMONARY
CP Rehab Note: chart review    Admit: NSTEMI    Mhx: DM, CAD, GERD, HTN, PUD, cardiac stents    Former smoker. Patient met with immediate resistance to CP Rehab visit and program stating, \"I'm not going to drive up here 4-5 times a week. \" Clarified with patient he can only come 2-3 times weekly, he replied with \"same difference. \"    Spouse indicated that they will review materials. Teaching directed more to spouse as patient continued to watch TV. Printed material given and discussed re: heart healthy habits, the cardiac diet, medication management, what to expect following coronary angioplasty, and post cardiac catheterization instructions. Reviewed the cardiac diet (low NA/fat/CHOL), the importance of medication compliance, monitoring for any unusual signs & symptoms and when to call the doctor. Wife indicated they have a treadmill at home. Spouse indicated they would consider CP Rehab and will call to schedule if she can get him to agree to two times weekly. Patient stated it was voluntary so he does not have to do it and he does not want to do it. Attempted to highlight benefits of program.  While agreeing that the program is voluntary, CP Rehab clearly stated program success also requires patient's participation and willingness - a team effort creates the best outcomes. Spouse appreciated time and effort spent to speak with patient.

## 2018-08-17 NOTE — PROGRESS NOTES
Progress Note      8/17/2018 8:33 AM  NAME: Mleania Brown   MRN:  [id number]   Admit Diagnosis: NSTEMI (non-ST elevated myocardial infarction) [facility])                          Assessment:                 1. Chest pain with increased troponin, consistent with NSTEMI  2. CAD with cath 6/2018 with PCI of mid LAD with 2.25 x 20 synergy, cath 8/2018 patent LAD stent, 50% ramus unchanged, EF 35%  3. ICM  4. Sinus with LBBB  5. HTN  6. DM  7. Dyslipidemia intolerant of statin  8. Hx of DVT  9. Quit tobacco in '67  10. , three kids, retired, ADLs, likes to [employer]                               Plan:                  Had been doing well until today. Was out shooting and developed chest pain, called EMS. He notes INR was 3.1 last week, today was 1.4. Thinks he missed a dose of warfarin.     Chest pain with increased troponin, suggestive of NSTEMI. Euvolemic  LBBB    Cath without obvious culprit vessel. EF lower than expected. Troponin higher than expected. ?Takatsubos, no LV gram on prior cath. If EF normalizes on follow up echo this may be etiology. ?PE given subtherapeutic INR, Will check V/Q     1. Resume warfarin, consider change to eliquis if PE seen on V/Q  2. Add back aspirin  3. Cont plavix  4. Increase toprol xl 12.5mg to 25mg  5. Resume telmisartan/amlod on discharge  6. S/p hydration for cath, cr stable  7. Intolerant of statin  8. Insulin sliding scale    Ambulate.          [x]        High complexity decision making was performed         Subjective:     Melania Brown denies chest pain, dyspnea. Discussed with RN events overnight.      Review of Systems:    Symptom Y/N Comments  Symptom Y/N Comments   Fever/Chills N   Chest Pain N    Poor Appetite N   Edema N    Cough N   Abdominal Pain N    Sputum N   Joint Pain N    SOB/DIAZ N   Pruritis/Rash N    Nausea/vomit N   Tolerating PT/OT Y    Diarrhea N   Tolerating Diet Y    Constipation N   Other       Could NOT obtain due to:      Objective: Physical Exam:    Last 24hrs VS reviewed since prior progress note. Most recent are:    Visit Vitals    /67 (BP 1 Location: Left arm, BP Patient Position: At rest)    Pulse 75    Temp 97.7 °F (36.5 °C)    Resp 16    Ht 5' 11\" (1.803 m)    Wt 125.6 kg (276 lb 14.4 oz)    SpO2 96%    BMI 38.62 kg/m2       Intake/Output Summary (Last 24 hours) at 08/17/18 0147  Last data filed at 08/17/18 8234   Gross per 24 hour   Intake             2335 ml   Output             1000 ml   Net             1335 ml        General Appearance: Well developed, well nourished, alert & oriented x 3,    no acute distress. Ears/Nose/Mouth/Throat: Hearing grossly normal.  Neck: Supple. Chest: Lungs clear to auscultation bilaterally. Cardiovascular: Regular rate and rhythm, S1S2 normal, no murmur. Abdomen: Soft, non-tender, bowel sounds are active. Extremities: No edema bilaterally. Skin: Warm and dry. PMH/SH reviewed - no change compared to H&P    Data Review    Telemetry: normal sinus rhythm     Lab Data Personally Reviewed:    Recent Labs      08/17/18   0456  08/16/18   1336   WBC  7.5  7.7   HGB  12.2  12.5   HCT  34.5*  35.4*   PLT  279  305     Recent Labs      08/16/18   1336   INR  1.4*   PTP  14.1*      Recent Labs      08/17/18   0456  08/16/18   1336   NA  137  135*   K  4.5  4.2   CL  106  105   CO2  23  20*   BUN  16  18   CREA  1.06  1. 35*   GLU  134*  141*   CA  9.0  9.3   MG  1.6   --      Recent Labs      08/17/18   0456  08/16/18   1336   TROIQ  12.00*  0.33*     Lab Results   Component Value Date/Time    Cholesterol, total 180 08/17/2018 04:56 AM    HDL Cholesterol 33 08/17/2018 04:56 AM    LDL, calculated 111.2 (H) 08/17/2018 04:56 AM    Triglyceride 179 (H) 08/17/2018 04:56 AM    CHOL/HDL Ratio 5.5 (H) 08/17/2018 04:56 AM       Recent Labs      08/16/18   1336   SGOT  45*   AP  69   TP  7.6   ALB  3.6   GLOB  4.0     No results for input(s): PH, PCO2, PO2 in the last 72 hours.     Medications Personally Reviewed:    Current Facility-Administered Medications   Medication Dose Route Frequency    clopidogrel (PLAVIX) tablet 75 mg  75 mg Oral DAILY    cyclobenzaprine (FLEXERIL) tablet 10 mg  10 mg Oral TID PRN    finasteride (PROSCAR) tablet 5 mg  5 mg Oral DAILY    insulin glargine (LANTUS) injection 55 Units  55 Units SubCUTAneous DAILY    pantoprazole (PROTONIX) tablet 40 mg  40 mg Oral ACB    metoprolol succinate (TOPROL-XL) XL tablet 12.5 mg  12.5 mg Oral DAILY    topiramate (TOPAMAX) tablet 25 mg  25 mg Oral BID    aspirin chewable tablet 81 mg  81 mg Oral DAILY    insulin lispro (HUMALOG) injection   SubCUTAneous AC&HS    glucose chewable tablet 16 g  4 Tab Oral PRN    dextrose (D50W) injection syrg 12.5-25 g  12.5-25 g IntraVENous PRN    glucagon (GLUCAGEN) injection 1 mg  1 mg IntraMUSCular PRN    sodium chloride (NS) flush 5-10 mL  5-10 mL IntraVENous Q8H    sodium chloride (NS) flush 5-10 mL  5-10 mL IntraVENous PRN    acetaminophen (TYLENOL) tablet 650 mg  650 mg Oral Q4H PRN    naloxone (NARCAN) injection 0.4 mg  0.4 mg IntraVENous PRN    ondansetron (ZOFRAN) injection 4 mg  4 mg IntraVENous Q4H PRN         Selina Looney MD

## 2018-08-18 VITALS
DIASTOLIC BLOOD PRESSURE: 61 MMHG | WEIGHT: 276.9 LBS | BODY MASS INDEX: 38.77 KG/M2 | HEIGHT: 71 IN | HEART RATE: 77 BPM | TEMPERATURE: 97.9 F | SYSTOLIC BLOOD PRESSURE: 129 MMHG | RESPIRATION RATE: 16 BRPM | OXYGEN SATURATION: 98 %

## 2018-08-18 LAB
ATRIAL RATE: 66 BPM
CALCULATED P AXIS, ECG09: 71 DEGREES
CALCULATED R AXIS, ECG10: -56 DEGREES
CALCULATED T AXIS, ECG11: 169 DEGREES
DIAGNOSIS, 93000: NORMAL
GLUCOSE BLD STRIP.AUTO-MCNC: 176 MG/DL (ref 65–100)
INR PPP: 1.2 (ref 0.9–1.1)
P-R INTERVAL, ECG05: 258 MS
PROTHROMBIN TIME: 12.1 SEC (ref 9–11.1)
Q-T INTERVAL, ECG07: 494 MS
QRS DURATION, ECG06: 156 MS
QTC CALCULATION (BEZET), ECG08: 517 MS
SERVICE CMNT-IMP: ABNORMAL
VENTRICULAR RATE, ECG03: 66 BPM

## 2018-08-18 PROCEDURE — 74011636637 HC RX REV CODE- 636/637: Performed by: INTERNAL MEDICINE

## 2018-08-18 PROCEDURE — 74011250637 HC RX REV CODE- 250/637: Performed by: INTERNAL MEDICINE

## 2018-08-18 PROCEDURE — 36415 COLL VENOUS BLD VENIPUNCTURE: CPT | Performed by: INTERNAL MEDICINE

## 2018-08-18 PROCEDURE — 85610 PROTHROMBIN TIME: CPT | Performed by: INTERNAL MEDICINE

## 2018-08-18 PROCEDURE — 82962 GLUCOSE BLOOD TEST: CPT

## 2018-08-18 RX ADMIN — Medication 10 ML: at 06:00

## 2018-08-18 RX ADMIN — PANTOPRAZOLE SODIUM 40 MG: 40 TABLET, DELAYED RELEASE ORAL at 09:14

## 2018-08-18 RX ADMIN — METOPROLOL SUCCINATE 25 MG: 25 TABLET, EXTENDED RELEASE ORAL at 09:13

## 2018-08-18 RX ADMIN — CLOPIDOGREL BISULFATE 75 MG: 75 TABLET ORAL at 09:14

## 2018-08-18 RX ADMIN — ASPIRIN 81 MG: 81 TABLET, CHEWABLE ORAL at 09:13

## 2018-08-18 RX ADMIN — INSULIN GLARGINE 55 UNITS: 100 INJECTION, SOLUTION SUBCUTANEOUS at 09:15

## 2018-08-18 RX ADMIN — INSULIN LISPRO 3 UNITS: 100 INJECTION, SOLUTION INTRAVENOUS; SUBCUTANEOUS at 09:16

## 2018-08-18 RX ADMIN — AMLODIPINE BESYLATE 5 MG: 5 TABLET ORAL at 09:14

## 2018-08-18 RX ADMIN — FINASTERIDE 5 MG: 5 TABLET, FILM COATED ORAL at 09:14

## 2018-08-18 NOTE — PROGRESS NOTES
Pt to discharge home today by private vehicle with family today. Pt to transport self or use family support for follow-up care appointments. Pt has no PT/OT needs at this time. Pt declined [facility] for MI during admission. PCP and Cardiology f/u appointments scheduled. All information entered into pt AVS.     Pt has no additional CM needs at this time. Floor nurse notified. Care Management Interventions  PCP Verified by CM: Yes (Dr Cecil Odom)  Palliative Care Criteria Met (RRAT>21 & CHF Dx)?: No  Mode of Transport at Discharge:  Other (see comment) (Wife)  Transition of Care Consult (CM Consult): Discharge Planning  Discharge Durable Medical Equipment: No (no DME use)  Physical Therapy Consult: No  Occupational Therapy Consult: No  Speech Therapy Consult: No  Current Support Network: Lives with Spouse, Own Home, Family Lives Nearby (Lives in a one story home with 5 steps to enter the home)  Confirm Follow Up Transport: Self  Plan discussed with Pt/Family/Caregiver: Yes  Discharge Location  Discharge Placement: Home with family assistance    HEATHER Jackson Supervisee in Social Work, [address] [place] [address]  [phone]

## 2018-08-18 NOTE — PROGRESS NOTES
Bedside report received from Reji Mason RN                 Assessment, Background, Procedure summary, Intake/Output, MAR, and recent results discussed. Care assumed. Discharge instructions reviewed with patient and family. Allowed adequate time to ask questions, all questions answered. Printed copy of AVS given to patient. All belongings gathered, IV and tele discontinued. Transported via wheelchair by RN to main entrance and into care of family.

## 2018-08-18 NOTE — PROGRESS NOTES
Pt has ambulated multiple times in hallway from . Tolerated well. Denies chest pain/ chest pressure/ shortness of breath. Will continue to monitor.

## 2018-08-20 ENCOUNTER — PATIENT OUTREACH (OUTPATIENT)
Dept: FAMILY MEDICINE CLINIC | Age: 83
End: 2018-08-20

## 2018-08-20 NOTE — PROGRESS NOTES
Hospital Discharge Follow-Up      Date/Time:  2018 9:12 AM    Patient was admitted to [facility] on 18 and discharged on 18 for NSTEMI. The physician discharge summary was available at the time of outreach. Patient was contacted within 1 business days of discharge. Top Challenges reviewed with the provider   none         Method of communication with provider :none    Inpatient RRAT score: 28  Was this a readmission? no   Patient stated reason for the readmission: N/A    Nurse Navigator (NN) contacted the patient by telephone to perform post hospital discharge assessment. Verified name and  with patient as identifiers. Provided introduction to self, and explanation of the Nurse Navigator role. Reviewed discharge instructions and red flags with patient who verbalized understanding. Patient given an opportunity to ask questions and does not have any further questions or concerns at this time. The patient agrees to contact the PCP office for questions related to their healthcare. NN provided contact information for future reference. Disease Specific:   N/A    Summary of patient's top problems:  1. NSTEMI- cath 6/15/18 and 18. On 18 trig 179, .2, also DM    Home Health orders at discharge: [facility], patient refused  [address]: N/A  Date of initial visit: [address] ordered/company: none  Durable Medical Equipment received: none    Barriers to care? none    Advance Care Planning:   Does patient have an Advance Directive:  reviewed and current     Medication(s):   New Medications at Discharge: ASA 81  Changed Medications at Discharge: bydureon, mag-ox, metoprolol, warfarin  Discontinued Medications at Discharge: none    Medication reconciliation was performed with patient, who verbalizes understanding of administration of home medications. There were no barriers to obtaining medications identified at this time.     Referral to Pharm D needed: no     Current Outpatient Prescriptions   Medication Sig    aspirin 81 mg chewable tablet Take 1 Tab by mouth daily.  metoprolol succinate (TOPROL-XL) 25 mg XL tablet Take 1 Tab by mouth daily.  warfarin (COUMADIN) 4 mg tablet Take 8 mg by mouth every evening.  magnesium oxide (MAG-OX) 400 mg tablet Take 800 mg by mouth two (2) times a day.  finasteride (PROSCAR) 5 mg tablet TAKE 1 TABLET DAILY FOR SYMPTOMATIC BENIGN PROSTATIC HYPERPLASIA    diclofenac (VOLTAREN) 1 % gel Apply  to affected area four (4) times daily.  clopidogrel (PLAVIX) 75 mg tab Take 1 Tab by mouth daily.  metFORMIN (GLUCOPHAGE) 1,000 mg tablet Take 1 Tab by mouth two (2) times a day.  lansoprazole (PREVACID) 30 mg capsule Take 1 Cap by mouth Daily (before breakfast).  Telmisartan-amLODIPine 80-5 mg tab TAKE 1 TABLET NIGHTLY FOR BLOOD PRESSURE    exenatide microspheres (BYDUREON) 2 mg/0.65 mL pnij 2 mg by SubCUTAneous route every seven (7) days. For diabetes (Patient taking differently: 2 mg by SubCUTAneous route every seven (7) days. For diabetes  Indications: Wednesdays)    insulin glargine (LANTUS SOLOSTAR) 100 unit/mL (3 mL) inpn 55 Units by SubCUTAneous route daily.  cholecalciferol, VITAMIN D3, (VITAMIN D3) 5,000 unit tab tablet Take 1,000 Units by mouth daily. Nature Made     cyanocobalamin (VITAMIN B-12) 1,000 mcg tablet Take 1,000 mcg by mouth daily. Nature Made    cyclobenzaprine (FLEXERIL) 10 mg tablet Take 10 mg by mouth three (3) times daily as needed for Muscle Spasm(s). No current facility-administered medications for this visit. There are no discontinued medications.     [facility] follow up appointment(s): Future Appointments  Date Time Provider Shell Elisabeth   8/22/2018 9:50 AM Claudia Pitt MD RDE [facility]   8/22/2018 11:10 AM Nehal Montana MD [address]   11/15/2018 9:10 AM MD María Patel      Non-[facility] follow up appointment(s): Dr. Luís Melo 9/5/18 @ [phone]  [facility]:  n/a       Goals      Prevent complications post hospitalization. 8/20/18 Patient denies S&S of infection, fever, confusion, chest pain. Will monitor for S&S of infection and report at next week outreach.  TONYA

## 2018-08-22 ENCOUNTER — OFFICE VISIT (OUTPATIENT)
Dept: FAMILY MEDICINE CLINIC | Age: 83
End: 2018-08-22

## 2018-08-22 ENCOUNTER — OFFICE VISIT (OUTPATIENT)
Dept: ENDOCRINOLOGY | Age: 83
End: 2018-08-22

## 2018-08-22 VITALS
HEIGHT: 71 IN | DIASTOLIC BLOOD PRESSURE: 77 MMHG | SYSTOLIC BLOOD PRESSURE: 127 MMHG | WEIGHT: 275.4 LBS | BODY MASS INDEX: 38.56 KG/M2 | HEART RATE: 83 BPM

## 2018-08-22 VITALS
HEART RATE: 74 BPM | RESPIRATION RATE: 18 BRPM | OXYGEN SATURATION: 97 % | DIASTOLIC BLOOD PRESSURE: 65 MMHG | SYSTOLIC BLOOD PRESSURE: 128 MMHG | HEIGHT: 71 IN | WEIGHT: 277.8 LBS | TEMPERATURE: 97 F | BODY MASS INDEX: 38.89 KG/M2

## 2018-08-22 DIAGNOSIS — I25.10 CORONARY ARTERY DISEASE DUE TO LIPID RICH PLAQUE: ICD-10-CM

## 2018-08-22 DIAGNOSIS — E78.5 HYPERLIPIDEMIA LDL GOAL <100: ICD-10-CM

## 2018-08-22 DIAGNOSIS — E83.42 HYPOMAGNESEMIA: ICD-10-CM

## 2018-08-22 DIAGNOSIS — Z51.81 ENCOUNTER FOR MONITORING COUMADIN THERAPY: ICD-10-CM

## 2018-08-22 DIAGNOSIS — Z95.820 S/P ANGIOPLASTY WITH STENT: ICD-10-CM

## 2018-08-22 DIAGNOSIS — Z86.718 HISTORY OF VENOUS THROMBOEMBOLISM: ICD-10-CM

## 2018-08-22 DIAGNOSIS — D68.59 HYPERCOAGULABLE STATE (HCC): ICD-10-CM

## 2018-08-22 DIAGNOSIS — Z79.01 ENCOUNTER FOR MONITORING COUMADIN THERAPY: ICD-10-CM

## 2018-08-22 DIAGNOSIS — I25.83 CORONARY ARTERY DISEASE DUE TO LIPID RICH PLAQUE: ICD-10-CM

## 2018-08-22 DIAGNOSIS — Z09 HOSPITAL DISCHARGE FOLLOW-UP: Primary | ICD-10-CM

## 2018-08-22 DIAGNOSIS — I21.4 NSTEMI (NON-ST ELEVATED MYOCARDIAL INFARCTION) (HCC): ICD-10-CM

## 2018-08-22 DIAGNOSIS — E11.9 TYPE 2 DIABETES MELLITUS WITHOUT COMPLICATION, WITH LONG-TERM CURRENT USE OF INSULIN (HCC): Primary | ICD-10-CM

## 2018-08-22 DIAGNOSIS — Z79.4 TYPE 2 DIABETES MELLITUS WITHOUT COMPLICATION, WITH LONG-TERM CURRENT USE OF INSULIN (HCC): Primary | ICD-10-CM

## 2018-08-22 DIAGNOSIS — E78.5 DYSLIPIDEMIA: ICD-10-CM

## 2018-08-22 RX ORDER — PEN NEEDLE, DIABETIC 32GX 5/32"
NEEDLE, DISPOSABLE MISCELLANEOUS
COMMUNITY
Start: 2018-05-21 | End: 2020-03-02 | Stop reason: SDUPTHER

## 2018-08-22 RX ORDER — EXENATIDE 2 MG/.65ML
INJECTION, SUSPENSION, EXTENDED RELEASE SUBCUTANEOUS
COMMUNITY
Start: 2018-06-06 | End: 2018-11-15 | Stop reason: ALTCHOICE

## 2018-08-22 NOTE — PROGRESS NOTES
History of Present Illness: Nuha Gauthier is a 80 y.o. male presents for follow-up of diabetes. He has had diabetes for 20 + years. He also has CAD  Since last visit he had admission in June/2018 that required stent placement. He then had a second admission earlier this month for chest pain. Heart catheterization did not show any significant restenosis. Suspicion was that he may have had a coronary artery spasm. Diabetes related complications:  Neuropathy: + mild sx of neuropathy. No other known complications. 6.4 with pre-labs in hospitalization. Current diabetes regimen:  Lantus 55 units   Metformin 1 g BID  Bydureon 2 mg weekly - added 11/2014. We discussed alternative formulary options. Glucoses:   Denies sx of hypoglycemia. Feels glucoses are increased. Had interruptions to typical medications while in hospital.     BMI 38 - overall stable. Recognizes need to lose weight  Feels he has a sugar 'addiction'. Has difficultly with sweets and desserts. Lipids - tried several statins, had very bothersome muscle cramps. Unwilling to try again    HTN : amlodipine/telmisartan. Blood pressure well controlled  Microalbuminuria - was normal with recent labs. Hypomagnesemia - 800 mg twice daily. Magnesium has remained low. Reflux - did not tolerate tapering off Prevacid. Nocturia remains a problem - following with urology. Did not want to do Botox injections. Social:  . Wife has been having some health problems. Also has brother in law who he helps care for.        Past Medical History:   Diagnosis Date    Arthritis     Back injury 12    CAD (coronary artery disease)     Stent replacement    Diabetes ([address]     GERD (gastroesophageal reflux disease)     Hypertension     Muscle cramps     PUD (peptic ulcer disease)     Reflux     Thromboembolus (HCC)     L BLE     Current Outpatient Prescriptions   Medication Sig    ALTAGRACIA PEN NEEDLE 32 gauge x 5/32\" ndle     aspirin 81 mg chewable tablet Take 1 Tab by mouth daily.  metoprolol succinate (TOPROL-XL) 25 mg XL tablet Take 1 Tab by mouth daily.  warfarin (COUMADIN) 4 mg tablet Take 8 mg by mouth every evening.  magnesium oxide (MAG-OX) 400 mg tablet Take 800 mg by mouth two (2) times a day.  finasteride (PROSCAR) 5 mg tablet TAKE 1 TABLET DAILY FOR SYMPTOMATIC BENIGN PROSTATIC HYPERPLASIA    diclofenac (VOLTAREN) 1 % gel Apply  to affected area four (4) times daily.  clopidogrel (PLAVIX) 75 mg tab Take 1 Tab by mouth daily.  metFORMIN (GLUCOPHAGE) 1,000 mg tablet Take 1 Tab by mouth two (2) times a day.  lansoprazole (PREVACID) 30 mg capsule Take 1 Cap by mouth Daily (before breakfast).  Telmisartan-amLODIPine 80-5 mg tab TAKE 1 TABLET NIGHTLY FOR BLOOD PRESSURE    exenatide microspheres (BYDUREON) 2 mg/0.65 mL pnij 2 mg by SubCUTAneous route every seven (7) days. For diabetes (Patient taking differently: 2 mg by SubCUTAneous route every seven (7) days. For diabetes  Indications: Wednesdays)    insulin glargine (LANTUS SOLOSTAR) 100 unit/mL (3 mL) inpn 55 Units by SubCUTAneous route daily.  cholecalciferol, VITAMIN D3, (VITAMIN D3) 5,000 unit tab tablet Take 1,000 Units by mouth daily. Nature Made     cyanocobalamin (VITAMIN B-12) 1,000 mcg tablet Take 1,000 mcg by mouth daily. Nature Made    cyclobenzaprine (FLEXERIL) 10 mg tablet Take 10 mg by mouth three (3) times daily as needed for Muscle Spasm(s). No current facility-administered medications for this visit. Allergies   Allergen Reactions    Statins-Hmg-Coa Reductase Inhibitors Myalgia     Tried several statins.         Review of Systems:  - Eyes: no blurry vision or double vision  - Cardiovascular: see HPI  - Respiratory: no shortness of breath  - Musculoskeletal: no myalgias  - Neurological: no numbness/tingling in extremities    Physical Examination:  Visit Vitals    /77 (BP Patient Position: Sitting)    Pulse 83    Ht 5' 11\" (1.803 m)    Wt 275 lb 6.4 oz (124.9 kg)    BMI 38.41 kg/m2   -   - General: pleasant, no distress, normal gait   HEENT: hearing intact, EOMI, clear sclera without icterus  - Cardiovascular: regular, normal rate   - Respiratory: normal effort  - Integumentary: no edema  - Psychiatric: normal mood and affect    Data Reviewed:   Component      Latest Ref Rng & Units 8/17/2018 8/17/2018 8/17/2018 8/17/2018           4:56 AM  4:56 AM  4:56 AM  4:56 AM   Sodium      136 - 145 mmol/L       Potassium      3.5 - 5.1 mmol/L       Chloride      97 - 108 mmol/L       CO2      21 - 32 mmol/L       Anion gap      5 - 15 mmol/L       Glucose      65 - 100 mg/dL       BUN      6 - 20 MG/DL       Creatinine      0.70 - 1.30 MG/DL       BUN/Creatinine ratio      12 - 20         GFR est AA      >60 ml/min/1.73m2       GFR est non-AA      >60 ml/min/1.73m2       Calcium      8.5 - 10.1 MG/DL       Cholesterol, total      <200 MG/      Triglyceride      <150 MG/ (H)      HDL Cholesterol      MG/DL 33      LDL, calculated      0 - 100 MG/.2 (H)      VLDL, calculated      MG/DL 35.8      CHOL/HDL Ratio      0 - 5.0   5.5 (H)      Hemoglobin A1c, (calculated)      4.2 - 6.3 %  6.4 (H)     Est. average glucose      mg/dL  137     TSH      0.36 - 3.74 uIU/mL   2.74    Magnesium      1.6 - 2.4 mg/dL    1.6     Component      Latest Ref Rng & Units 8/17/2018           4:56 AM   Sodium      136 - 145 mmol/L 137   Potassium      3.5 - 5.1 mmol/L 4.5   Chloride      97 - 108 mmol/L 106   CO2      21 - 32 mmol/L 23   Anion gap      5 - 15 mmol/L 8   Glucose      65 - 100 mg/dL 134 (H)   BUN      6 - 20 MG/DL 16   Creatinine      0.70 - 1.30 MG/DL 1.06   BUN/Creatinine ratio      12 - 20   15   GFR est AA      >60 ml/min/1.73m2 >60   GFR est non-AA      >60 ml/min/1.73m2 >60   Calcium      8.5 - 10.1 MG/DL 9.0     Assessment/Plan:   1.  Type 2 diabetes mellitus without complication, with long-term current use of insulin (HCC)   - overall control is acceptable. Will work on optimizing medications for weight and CV risk reduction  - change Bydureon to Ozempic - start 0.25 mg x 4 weeks, then increase to 0.5 mg weekly. Reviewed the results of a head-to-head comparison trial (SUSTAIN 3) which showed substantially greater A1c lowering as well as weight loss (2x weight loss) with Ozempic versus Bydureon. Discussed adding Jardiance in 1-2 months, after he has determined whether he is tolerating the Ozempic are not. Encouraged him to monitor glucoses and to decrease the Lantus dose if he has values less than 100.     2. Coronary artery disease due to lipid rich plaque   Bydureon had neutral effect. Ozempic was associated with 25% reduction in cardiovascular safety trial  Will add Jardiance as this is indicated to lower CV events. 3. BMI 38.0-38.9,adult   - optimizing medications   4. Hypomagnesemia - will follow. Low-normal of late. Adding SGLT2 may help as this class is typically associated with 0.1 +/- increase in magnesium levels. 5. Dyslipidemia   - discussed statins. He prefers not to re-try. Intolerant in past  - discussed PCSK9 inhibitors, but deterred by costs. - he will work on dietary improvements, exercise  And weight loss     Patient Instructions   Diabetes type II. Continue metformin   Continue Latnus 55 units   Change Bydureon to Ozempic - Start with 0.25 mg weekly for 4 weeks, then increase to 0.5 mg weekly. Side effect can be nausea. Let me know if this is a problem   Continue dietary efforts and weight loss efforts    ** If you have values less than 90, then decrease Lantus in 5 unit increments weekly. ** In several months we can add Jardiance, which will lower weight, glucoses, insulin needs and should decrease cardiovascular events. We should hopefully be able to decrease Lantus more.      Blood pressure:  Continue amlodipine + telmisartan - > take at bedtime    Low magnesium:  Continue taking 1600 mg per day. See if you can take 400 mg breakfast, lunch, supper and bedtime OR  Continue 800 mg twice daily. Follow-up Disposition:  Return in about 3 months (around 11/22/2018).     Copy sent to:

## 2018-08-22 NOTE — MR AVS SNAPSHOT
[address] [facility] [address] [address] [phone] Patient: Davie Jensen MRN: [id number] SYF:[birth year]24[birth year] Visit Information Date & Time Provider Department Dept. Phone Encounter #  
 8/22/2018  9:50 AM Mattihas Workman, [address] [facility] Diabetes and Endocrinology [phone] Follow-up Instructions Return in about 3 months (around 11/22/2018). Your Appointments 11/15/2018  9:10 AM  
ROUTINE CARE with Salbador Urban MD  
[facility]) Appt Note: 3 mon f/u  
 [address] [id number]  
[facility]  
  
    
  
 8/22/2018 11:10 AM  
TRANSITIONAL CARE MANAGEMENT with Salbador Urban MD  
[facility]) Appt Note: [facility] D/C 8/18/2018  
 [address] [id number]  
[facility] Upcoming Health Maintenance Date Due Pneumococcal 65+ Low/Medium Risk (2 of 2 - PPSV23) 12/12/2018* Influenza Age 5 to Adult 2/15/2019* MICROALBUMIN Q1 11/15/2018 EYE EXAM RETINAL OR DILATED Q1 11/30/2018 HEMOGLOBIN A1C Q6M 2/17/2019 MEDICARE YEARLY EXAM 4/19/2019 FOOT EXAM Q1 8/15/2019 LIPID PANEL Q1 8/17/2019 GLAUCOMA SCREENING Q2Y 11/30/2019 DTaP/Tdap/Td series (2 - Td) 5/18/2027 *Topic was postponed. The date shown is not the original due date. Allergies as of 8/22/2018  Review Complete On: 8/22/2018 By: Matthias Workman MD  
  
 Severity Noted Reaction Type Reactions Statins-hmg-coa Reductase Inhibitors  03/23/2016   Side Effect Myalgia Tried several statins. Current Immunizations  Never Reviewed Name Date Influenza High Dose Vaccine PF 10/5/2017 Not reviewed this visit You Were Diagnosed With   
 Codes Comments Type 2 diabetes mellitus without complication, with long-term current use of insulin (HCC)    -  Primary ICD-10-CM: E11.9, Z79.4 ICD-9-CM: 250.00, V58.67 Coronary artery disease due to lipid rich plaque     ICD-10-CM: I25.10, I25.83 ICD-9-CM: 414.00, 414.3 BMI 38.0-38.9,adult     ICD-10-CM: M97.11 
ICD-9-CM: V85.38 Hypomagnesemia     ICD-10-CM: L33.66 
ICD-9-CM: 275.2 Dyslipidemia     ICD-10-CM: E78.5 ICD-9-CM: 272.4 Vitals BP Pulse Height(growth percentile) Weight(growth percentile) BMI Smoking Status 127/77 (BP Patient Position: Sitting) 83 5' 11\" (1.803 m) 275 lb 6.4 oz (124.9 kg) 38.41 kg/m2 Former Smoker Vitals History BMI and BSA Data Body Mass Index Body Surface Area  
 38.41 kg/m 2 2.5 m 2 Preferred Pharmacy Pharmacy Name Phone Gordo Beltran, [facility] [phone] Your Updated Medication List  
  
   
This list is accurate as of 8/22/18 10:36 AM.  Always use your most recent med list.  
  
  
  
  
 aspirin 81 mg chewable tablet Take 1 Tab by mouth daily. clopidogrel 75 mg Tab Commonly known as:  PLAVIX Take 1 Tab by mouth daily. diclofenac 1 % Gel Commonly known as:  VOLTAREN Apply  to affected area four (4) times daily. finasteride 5 mg tablet Commonly known as:  PROSCAR  
TAKE 1 TABLET DAILY FOR SYMPTOMATIC BENIGN PROSTATIC HYPERPLASIA FLEXERIL 10 mg tablet Generic drug:  cyclobenzaprine Take 10 mg by mouth three (3) times daily as needed for Muscle Spasm(s). insulin glargine 100 unit/mL (3 mL) Inpn Commonly known as:  LANTUS SOLOSTAR U-100 INSULIN  
55 Units by SubCUTAneous route daily. lansoprazole 30 mg capsule Commonly known as:  PREVACID Take 1 Cap by mouth Daily (before breakfast). magnesium oxide 400 mg tablet Commonly known as:  MAG-OX Take 800 mg by mouth two (2) times a day. metFORMIN 1,000 mg tablet Commonly known as:  GLUCOPHAGE Take 1 Tab by mouth two (2) times a day. metoprolol succinate 25 mg XL tablet Commonly known as:  TOPROL-XL Take 1 Tab by mouth daily. Linda Pen Needle 32 gauge x \" Ndle Generic drug:  Insulin Needles (Disposable)  
  
 semaglutide 0.25 mg/0.2 mL (2 mg/1.5 mL) sub-q pen Commonly known as:  OZEMPIC  
0.5 mg by SubCUTAneous route every seven (7) days. Replaces Bydureon. For diabetes. Telmisartan-amLODIPine 80-5 mg Tab TAKE 1 TABLET NIGHTLY FOR BLOOD PRESSURE  
  
 VITAMIN B-12 1,000 mcg tablet Generic drug:  cyanocobalamin Take 1,000 mcg by mouth daily. Nature Made VITAMIN D3 5,000 unit Tab tablet Generic drug:  cholecalciferol (VITAMIN D3) Take 1,000 Units by mouth daily. Nature Made  
  
 warfarin 4 mg tablet Commonly known as:  COUMADIN Take 8 mg by mouth every evening. Prescriptions Sent to Pharmacy Refills  
 semaglutide (OZEMPIC) 0.25 mg/0.2 mL (2 mg/1.5 mL) sub-q pen 3 Si.5 mg by SubCUTAneous route every seven (7) days. Replaces Bydureon. For diabetes. Class: Normal  
 Pharmacy: [facility] Lisa Wright, [address] [place] [address] #: [phone] Route: SubCUTAneous Follow-up Instructions Return in about 3 months (around 2018). Patient Instructions Diabetes type II. Continue metformin Continue Latnus 55 units Change Bydureon to Ozempic - Start with 0.25 mg weekly for 4 weeks, then increase to 0.5 mg weekly. Side effect can be nausea. Let me know if this is a problem Continue dietary efforts and weight loss efforts ** If you have values less than 90, then decrease Lantus in 5 unit increments weekly. ** In several months we can add Jardiance, which will lower weight, glucoses, insulin needs and should decrease cardiovascular events. We should hopefully be able to decrease Lantus more. Blood pressure: Continue amlodipine + telmisartan - > take at bedtime Low magnesium: 
Continue taking 1600 mg per day. See if you can take 400 mg breakfast, lunch, supper and bedtime OR  Continue 800 mg twice daily. Introducing [facility]! [facility] introduces [employer] patient portal. Now you can access parts of your medical record, email your doctor's office, and request medication refills online. 1. In your internet browser, go to [email or web address] 2. Click on the First Time User? Click Here link in the Sign In box. You will see the New Member Sign Up page. 3. Enter your [employer] Access Code exactly as it appears below. You will not need to use this code after youve completed the sign-up process. If you do not sign up before the expiration date, you must request a new code. · [employer] Access Code: [email or web address] Expires: 10/13/2018  5:20 AM 
 
4. Enter the last four digits of your Social Security Number (xxxx) and Date of Birth (mm/dd/yyyy) as indicated and click Submit. You will be taken to the next sign-up page. 5. Create a [employer] ID. This will be your [employer] login ID and cannot be changed, so think of one that is secure and easy to remember. 6. Create a [employer] password. You can change your password at any time. 7. Enter your Password Reset Question and Answer. This can be used at a later time if you forget your password. 8. Enter your e-mail address. You will receive e-mail notification when new information is available in [address] 9. Click Sign Up. You can now view and download portions of your medical record. 10. Click the Download Summary menu link to download a portable copy of your medical information. If you have questions, please visit the Frequently Asked Questions section of the [employer] website. Remember, [employer] is NOT to be used for urgent needs. For medical emergencies, dial 911. Now available from your iPhone and Android! Please provide this summary of care documentation to your next provider. Your primary care clinician is listed as Ray Morales. If you have any questions after today's visit, please call [phone].

## 2018-08-22 NOTE — MR AVS SNAPSHOT
[address] [place] 
[phone] Patient: Flor Riggs MRN: [id number] XPA:[id number] Visit Information Date & Time Provider Department Dept. Phone Encounter #  
 8/22/2018 11:10 AM Kimber Byers MD [facility] at [address] [id number] Follow-up Instructions Return in about 4 weeks (around 9/19/2018), or if symptoms worsen or fail to improve. Your Appointments 11/15/2018  9:10 AM  
ROUTINE CARE with Kimber Byers MD  
[facility] at [facility] [address] [facility]) Appt Note: 3 mon f/u  
 [facility] [address] [id number]  
[facility] [place]  
  
    
 11/21/2018  9:50 AM  
Follow Up with Sol Jones MD  
[facility] [address] [facility]) Appt Note: 3 month f/u Diabetes [address] [id number] [address] [place] [address] Upcoming Health Maintenance Date Due Pneumococcal 65+ Low/Medium Risk (2 of 2 - PPSV23) 12/12/2018* Influenza Age 5 to Adult 2/15/2019* MICROALBUMIN Q1 11/15/2018 EYE EXAM RETINAL OR DILATED Q1 11/30/2018 HEMOGLOBIN A1C Q6M 2/17/2019 MEDICARE YEARLY EXAM 4/19/2019 FOOT EXAM Q1 8/15/2019 LIPID PANEL Q1 8/17/2019 GLAUCOMA SCREENING Q2Y 11/30/2019 DTaP/Tdap/Td series (2 - Td) 5/18/2027 *Topic was postponed. The date shown is not the original due date. Allergies as of 8/22/2018  Review Complete On: 8/22/2018 By: Kimber Byers MD  
  
 Severity Noted Reaction Type Reactions Statins-hmg-coa Reductase Inhibitors  03/23/2016   Side Effect Myalgia Tried several statins. Current Immunizations  Never Reviewed Name Date Influenza High Dose Vaccine PF 10/5/2017 Not reviewed this visit You Were Diagnosed With   
  
 Codes Comments Hospital discharge follow-up    -  Primary ICD-10-CM: [address] [facility] ICD-9-CM: V67.59 Hypercoagulable state ([facility]     ICD-10-CM: K79.98 
ICD-9-CM: 289.81 History of venous thromboembolism     ICD-10-CM: Z86.718 ICD-9-CM: V12.51 Encounter for monitoring coumadin therapy     ICD-10-CM: Z51.81, Z79.01 
ICD-9-CM: V58.83, V58.61 NSTEMI (non-ST elevated myocardial infarction) ([facility]     ICD-10-CM: I21.4 ICD-9-CM: 410.70 Coronary artery disease due to lipid rich plaque     ICD-10-CM: I25.10, I25.83 ICD-9-CM: 414.00, 414.3 Hyperlipidemia LDL goal <100     ICD-10-CM: E78.5 ICD-9-CM: 272.4 S/P angioplasty with stent     ICD-10-CM: Z95.9 ICD-9-CM: V45.89 Vitals BP Pulse Temp Resp Height(growth percentile) Weight(growth percentile) 128/65 (BP 1 Location: Left arm, BP Patient Position: Sitting) 74 97 °F (36.1 °C) (Oral) 18 5' 11\" (1.803 m) 277 lb 12.8 oz (126 kg) SpO2 BMI Smoking Status 97% 38.75 kg/m2 Former Smoker Vitals History BMI and BSA Data Body Mass Index Body Surface Area 38.75 kg/m 2 2.51 m 2 Preferred Pharmacy Pharmacy Name Phone [facility] [phone] Your Updated Medication List  
  
   
This list is accurate as of 8/22/18 12:17 PM.  Always use your most recent med list.  
  
  
  
  
 aspirin 81 mg chewable tablet Take 1 Tab by mouth daily. BYDUREON 2 mg/0.65 mL Pnij Generic drug:  exenatide microspheres  
  
 clopidogrel 75 mg Tab Commonly known as:  PLAVIX Take 1 Tab by mouth daily. diclofenac 1 % Gel Commonly known as:  VOLTAREN Apply  to affected area four (4) times daily. finasteride 5 mg tablet Commonly known as:  PROSCAR  
TAKE 1 TABLET DAILY FOR SYMPTOMATIC BENIGN PROSTATIC HYPERPLASIA FLEXERIL 10 mg tablet Generic drug:  cyclobenzaprine Take 10 mg by mouth three (3) times daily as needed for Muscle Spasm(s). insulin glargine 100 unit/mL (3 mL) Inpn Commonly known as:  LANTUS SOLOSTAR U-100 INSULIN  
55 Units by SubCUTAneous route daily. lansoprazole 30 mg capsule Commonly known as:  PREVACID Take 1 Cap by mouth Daily (before breakfast). magnesium oxide 400 mg tablet Commonly known as:  MAG-OX Take 800 mg by mouth two (2) times a day. metFORMIN 1,000 mg tablet Commonly known as:  GLUCOPHAGE Take 1 Tab by mouth two (2) times a day. metoprolol succinate 25 mg XL tablet Commonly known as:  TOPROL-XL Take 1 Tab by mouth daily. Linda Pen Needle 32 gauge x 5/32\" Ndle Generic drug:  Insulin Needles (Disposable)  
  
 semaglutide 0.25 mg/0.2 mL (2 mg/1.5 mL) sub-q pen Commonly known as:  OZEMPIC  
0.5 mg by SubCUTAneous route every seven (7) days. Replaces Bydureon. For diabetes. Telmisartan-amLODIPine 80-5 mg Tab TAKE 1 TABLET NIGHTLY FOR BLOOD PRESSURE  
  
 VITAMIN B-12 1,000 mcg tablet Generic drug:  cyanocobalamin Take 1,000 mcg by mouth daily. Nature Made VITAMIN D3 5,000 unit Tab tablet Generic drug:  cholecalciferol (VITAMIN D3) Take 1,000 Units by mouth daily. Nature Made  
  
 warfarin 4 mg tablet Commonly known as:  COUMADIN Take 8 mg by mouth every evening. August 2018 Details Sun Mon Tue Wed Thu Fri Sat  
     1  
  
  
  
   2  
  
  
  
   3  
  
  
  
   4  
  
  
  
  
  5  
  
  
  
   6  
  
  
  
   7  
  
  
  
   8  
  
  
  
   9  
  
  
  
   10  
  
  
  
   11  
  
  
  
  
  12  
  
  
  
   13  
  
  
  
   14  
  
  
  
   15  
  
  
  
   16  
  
  
  
   17  
  
  
  
   18  
  
  
  
  
  19  
  
  
  
   20  
  
  
  
   21  
  
  
  
   22  
  
8 mg See details 23  
  
8 mg  
  
   24  
  
8 mg  
  
   25  
  
8 mg  
  
  
  26  
  
8 mg  
  
   27  
  
8 mg  
  
   28  
  
8 mg  
  
   29  
  
8 mg  
  
   30  
  
8 mg 31  
  
8 mg Date Details 08/22 This INR check Date of next INR: No date specified How to take your warfarin dose To take:  8 mg Take two of the 4 mg tablets. Follow-up Instructions Return in about 4 weeks (around 9/19/2018), or if symptoms worsen or fail to improve. Introducing [facility] SERVICES! Nehal Bhagat introduces [employer] patient portal. Now you can access parts of your medical record, email your doctor's office, and request medication refills online. 1. In your internet browser, go to [email or web address] 2. Click on the First Time User? Click Here link in the Sign In box. You will see the New Member Sign Up page. 3. Enter your [employer] Access Code exactly as it appears below. You will not need to use this code after youve completed the sign-up process. If you do not sign up before the expiration date, you must request a new code. · [employer] Access Code: [email or web address] Expires: 10/13/2018  5:20 AM 
 
4. Enter the last four digits of your Social Security Number (xxxx) and Date of Birth (mm/dd/yyyy) as indicated and click Submit. You will be taken to the next sign-up page. 5. Create a Ed[employer] ID. This will be your [employer] login ID and cannot be changed, so think of one that is secure and easy to remember. 6. Create a Ed[employer] password. You can change your password at any time. 7. Enter your Password Reset Question and Answer. This can be used at a later time if you forget your password. 8. Enter your e-mail address. You will receive e-mail notification when new information is available in [address] 9. Click Sign Up. You can now view and download portions of your medical record. 10. Click the Download Summary menu link to download a portable copy of your medical information. If you have questions, please visit the Frequently Asked Questions section of the [employer] website.  Remember, [employer] is NOT to be used for urgent needs. For medical emergencies, dial 911. Now available from your iPhone and Android! Please provide this summary of care documentation to your next provider. Your primary care clinician is listed as Lily Chen. If you have any questions after today's visit, please call [phone].

## 2018-08-22 NOTE — PROGRESS NOTES
Chief Complaint   Patient presents with   [facility] Follow Up     Discharged 8/18/18 NSTEMI   1. Have you been to the ER, urgent care clinic since your last visit? Hospitalized since your last visit? Yes Where: ED [facility]    2. Have you seen or consulted any other health care providers outside of the [facility] since your last visit? Include any pap smears or colon screening.  No   Pulmonary cancelled appointment  Room 8

## 2018-08-22 NOTE — PROGRESS NOTES
Subjective:     Chief Complaint   Patient presents with   [facility] Follow Up     Discharged 8/18/18 NSTEMI      He  is a 80 y.o. male who presents for evaluation of:  Hospital d/c f/u - NSTEMI and had another cardiac cath that looked ok including no stenosis of recent stenting. To f/u with Dr. Allegra Gregory soon. Feeling much better overall. 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 See anti-coag tab for history and dosing with plan. INR yesterday was 1.5 on home reading after restarting coumadin. ROS  Gen - no fever/chills  Resp - + abnormal chest CT and chronic cough - to see Pulm soon  CV - no chest pain or DIAZ  Rest per HPI    Past Medical History:   Diagnosis Date    Arthritis     Back injury 1994    CAD (coronary artery disease)     Stent replacement    Diabetes ([address]     GERD (gastroesophageal reflux disease)     Hypertension     Muscle cramps     PUD (peptic ulcer disease)     Reflux     [facility]emb[facility]     L BLE     Past Surgical History:   Procedure Laterality Date    HX ORTHOPAEDIC Left     Crushed/left index finger amputee    HX ORTHOPAEDIC      heal spur removed left foot    HX OTHER SURGICAL      hand surgery, heal spur L    UPPER GI ENDOSCOPY,BIOPSY  5/16/2018         UPPER GI ENDOSCOPY,DILATN W GUIDE  5/16/2018          Current Outpatient Prescriptions on File Prior to Visit   Medication Sig Dispense Refill    ALTAGRACIA PEN NEEDLE 32 gauge x 5/32\" ndle       aspirin 81 mg chewable tablet Take 1 Tab by mouth daily. 90 Tab 1    metoprolol succinate (TOPROL-XL) 25 mg XL tablet Take 1 Tab by mouth daily. 90 Tab 3    warfarin (COUMADIN) 4 mg tablet Take 8 mg by mouth every evening.  magnesium oxide (MAG-OX) 400 mg tablet Take 800 mg by mouth two (2) times a day.       finasteride (PROSCAR) 5 mg tablet TAKE 1 TABLET DAILY FOR SYMPTOMATIC BENIGN PROSTATIC HYPERPLASIA 90 Tab 1    diclofenac (VOLTAREN) 1 % gel Apply  to affected area four (4) times daily. 100 g 5    clopidogrel (PLAVIX) 75 mg tab Take 1 Tab by mouth daily. 30 Tab 11    metFORMIN (GLUCOPHAGE) 1,000 mg tablet Take 1 Tab by mouth two (2) times a day. 180 Tab 1    lansoprazole (PREVACID) 30 mg capsule Take 1 Cap by mouth Daily (before breakfast). 90 Cap 1    Telmisartan-amLODIPine 80-5 mg tab TAKE 1 TABLET NIGHTLY FOR BLOOD PRESSURE 90 Tab 1    insulin glargine (LANTUS SOLOSTAR) 100 unit/mL (3 mL) inpn 55 Units by SubCUTAneous route daily. 15 Pen 3    cholecalciferol, VITAMIN D3, (VITAMIN D3) 5,000 unit tab tablet Take 1,000 Units by mouth daily. Nature Made       cyanocobalamin (VITAMIN B-12) 1,000 mcg tablet Take 1,000 mcg by mouth daily. Nature Made      cyclobenzaprine (FLEXERIL) 10 mg tablet Take 10 mg by mouth three (3) times daily as needed for Muscle Spasm(s).  semaglutide (OZEMPIC) 0.25 mg/0.2 mL (2 mg/1.5 mL) sub-q pen 0.5 mg by SubCUTAneous route every seven (7) days. Replaces Bydureon. For diabetes. [address]     No current facility-administered medications on file prior to visit. Objective:     Vitals:    08/22/18 1139   BP: 128/65   Pulse: 74   Resp: 18   Temp: 97 °F (36.1 °C)   TempSrc: Oral   SpO2: 97%   Weight: 277 lb 12.8 oz (126 kg)   Height: 5' 11\" (1.803 m)     Physical Examination:  General appearance - alert, well appearing, and in no distress  Eyes -sclera anicteric  Neck - supple, no significant adenopathy, no thyromegaly  Chest - clear to auscultation, no wheezes, rales or rhonchi, symmetric air entry  Heart - normal rate, regular rhythm, normal S1, S2, no murmurs, rubs, clicks or gallops  Neurological - alert, oriented, no focal findings or movement disorder noted  Extr - trace edema    Assessment/ Plan:   Diagnoses and all orders for this visit:    1. Hospital discharge follow-up - doing ok after NSTEMI. Has no complaints today, discussed weight loss. 2. Hypercoagulable state ([address]  3. History of venous thromboembolism  4.  Encounter for monitoring coumadin therapy  - INR improving after being off coumadin, ct current dose and recheck in 1 week  -     AMB POC PT/INR    5. NSTEMI (non-ST elevated myocardial infarction) ([address]  6. Coronary artery disease due to lipid rich plaque  7. Hyperlipidemia LDL goal <100  8. S/P angioplasty with stent  - stable, following with Cardiology    I have discussed the diagnosis with the patient and the intended plan as seen in the above orders. The patient has received an after-visit summary and questions were answered concerning future plans. I have discussed medication side effects and warnings with the patient as well. The patient verbalizes understanding and agreement with the plan. Follow-up Disposition:  Return in about 4 weeks (around 9/19/2018), or if symptoms worsen or fail to improve.

## 2018-08-28 ENCOUNTER — PATIENT OUTREACH (OUTPATIENT)
Dept: FAMILY MEDICINE CLINIC | Age: 83
End: 2018-08-28

## 2018-08-28 NOTE — PROGRESS NOTES
Goals  Prevent complications post hospitalization. 8/20/18 Patient denies S&S of infection, fever, confusion, chest pain. Will monitor for S&S of infection and report at next week outreach. TONYA 
 
8/28/18 Patient denies fever, confusion, chest pain, S&S of infection. He reports site is all healed up. Appointment with Dr. Kaveh Quach is 9/5/18. Patient will continue to monitor for S&S of infection and report at next week outreach and Dr. Kaveh Quach report.  [facility]

## 2018-08-30 NOTE — PROCEDURES
BRIEF OPERATIVE NOTE    Date of Procedure: 8/16/2018   Procedure: Cardiac catheterization    Preoperative Diagnosis: Coronary artery disease  Postoperative Diagnosis: Coronary artery disease     Surgeon/assistant: Geri Santana MD    Anesthesia: Conscious sedation  Estimated Blood Loss: <50cc  Specimens: None    Findings:   LVEDP: 15   Left ventricular angiography: EF 35% anterior/apical hypokinesis   Coronary angiography: Patent mid LaD stent, 50% ostial ramus   Intervention:None    Complications: None  Non-coronary Implants: None Dr. Ireland aware patient had run of vtach patient not symptomatic sleeping. Consult for cardiology in. Patient to remain on remote telemetry.

## 2018-09-04 DIAGNOSIS — I10 ESSENTIAL HYPERTENSION: ICD-10-CM

## 2018-09-04 RX ORDER — TELMISARTAN AND AMLODIPINE 5; 80 MG/1; MG/1
TABLET ORAL
Qty: 30 TAB | Refills: 0 | Status: SHIPPED | OUTPATIENT
Start: 2018-09-04 | End: 2018-11-25 | Stop reason: SDUPTHER

## 2018-09-04 RX ORDER — WARFARIN 4 MG/1
8 TABLET ORAL EVERY EVENING
Qty: 30 TAB | Refills: 0 | Status: SHIPPED | OUTPATIENT
Start: 2018-09-04 | End: 2018-11-25 | Stop reason: SDUPTHER

## 2018-09-04 NOTE — TELEPHONE ENCOUNTER
----- Message from Joan Romano sent at 9/4/2018 11:13 AM EDT -----  Regarding: Dr. Jose Enrique Larson (Wife) requesting Rx refill of \" Coumadin tab 4mg\" and \"Telmisartan-amlodipine tab 80/5mg\". wife advised that patient is needing 14 pills of each until his full Rx comes in. Pt uses [facility] [phone]. Wife advised Pt is completely out of medications.     Best contact: [phone]

## 2018-09-06 ENCOUNTER — PATIENT OUTREACH (OUTPATIENT)
Dept: FAMILY MEDICINE CLINIC | Age: 83
End: 2018-09-06

## 2018-09-07 NOTE — PROGRESS NOTES
Goals  Prevent complications post hospitalization. 8/20/18 Patient denies S&S of infection, fever, confusion, chest pain. Will monitor for S&S of infection and report at next week outreach. [facility] 
 
8/28/18 Patient denies fever, confusion, chest pain, S&S of infection. He reports site is all healed up. Appointment with Dr. Torrie Randolph is 9/5/18. Patient will continue to monitor for S&S of infection and report at next week outreach and Dr. Torrie Randolph report. [facility] [place] 
 
9/7/18 Patient reports his mother passed away last night. Condolences offered. Will follow up next week with patient.  [facility] [place]

## 2018-09-11 ENCOUNTER — PATIENT OUTREACH (OUTPATIENT)
Dept: FAMILY MEDICINE CLINIC | Age: 83
End: 2018-09-11

## 2018-09-11 NOTE — PROGRESS NOTES
Goals  Prevent complications post hospitalization. 8/20/18 Patient denies S&S of infection, fever, confusion, chest pain. Will monitor for S&S of infection and report at next week outreach. [facility] 
 
8/28/18 Patient denies fever, confusion, chest pain, S&S of infection. He reports site is all healed up. Appointment with Dr. Teresa Lynn is 9/5/18. Patient will continue to monitor for S&S of infection and report at next week outreach and Dr. Teresa Lynn report. [facility] 
 
9/7/18 Patient reports his mother passed away last night. Condolences offered. Will follow up next week with patient. [facility] 
 
9/11/18 Patient report he is as ready as he can be before the storm. He has supplies needed for about 1 week to include his medication.  [facility]

## 2018-09-25 ENCOUNTER — TELEPHONE (OUTPATIENT)
Dept: ENDOCRINOLOGY | Age: 83
End: 2018-09-25

## 2018-10-11 NOTE — DISCHARGE SUMMARY
[facility] [address]    Malina JAIMES  MR#: [id number]  : 1934  ACCOUNT #: [de-identified]   ADMIT DATE: 2018  DISCHARGE DATE: 2018    PRIMARY CARDIOLOGIST:  Dr. Keke Moffett:  1. Chest pain syndrome with increased troponin consistent with non-ST elevation myocardial infarction. 2.  Coronary artery atherosclerosis. 3.  Status post stenting of the mid left anterior descending in 2018. 4.  Catheterization on 2018 showing continued wide patency of the previously stented left anterior descending artery. No other intervention. 5.  Left ventricular ejection fraction 35%. Moderate left ventricular dysfunction. 6.  Left bundle branch block. 7.  Hypertension. 8.  Diabetes mellitus type 2.  9.  Hyperlipidemia. 10.  Intolerance of statin therapy. 11.  History of deep venous thrombosis. DISCHARGE MEDICATIONS:  Aspirin 81 mg daily, magnesium 400 mg b.i.d., metoprolol XL 25 mg daily, Plavix 75 mg daily, Proscar daily, Prevacid 30 mg daily, metformin 1000 mg b.i.d., vitamin B12, vitamin D3. He is to follow up with Dr. Jelani Booker concerning Coumadin therapy. HISTORY AND PHYSICAL:  He is an 66-year-old male with coronary disease, having had a recent PCI of the left anterior descending artery. presented to the hospital with chest pain syndrome, relieved by aspirin and sublingual nitroglycerin. His troponin level was elevated. Catheterization by Dr. Jelani Booker showed continued patency of the previously stented left anterior descending artery and no other significant progression of disease and no further intervention was undertaken. The patient did well with medical therapy. DISPOSITION:  Discharged home on the above regimen.       MD YVONNE Frey/[place]  D: 10/10/2018 20:42     T: 10/10/2018 20:53  JOB #: [id number]

## 2018-11-15 ENCOUNTER — OFFICE VISIT (OUTPATIENT)
Dept: FAMILY MEDICINE CLINIC | Age: 83
End: 2018-11-15

## 2018-11-15 VITALS
OXYGEN SATURATION: 97 % | WEIGHT: 272.6 LBS | HEART RATE: 79 BPM | SYSTOLIC BLOOD PRESSURE: 147 MMHG | DIASTOLIC BLOOD PRESSURE: 72 MMHG | BODY MASS INDEX: 38.16 KG/M2 | RESPIRATION RATE: 16 BRPM | HEIGHT: 71 IN | TEMPERATURE: 97.3 F

## 2018-11-15 DIAGNOSIS — E11.9 TYPE 2 DIABETES MELLITUS WITHOUT COMPLICATION, WITH LONG-TERM CURRENT USE OF INSULIN (HCC): ICD-10-CM

## 2018-11-15 DIAGNOSIS — Z86.718 HISTORY OF VENOUS THROMBOEMBOLISM: ICD-10-CM

## 2018-11-15 DIAGNOSIS — I25.10 CORONARY ARTERY DISEASE DUE TO LIPID RICH PLAQUE: ICD-10-CM

## 2018-11-15 DIAGNOSIS — E66.01 OBESITY, MORBID (HCC): ICD-10-CM

## 2018-11-15 DIAGNOSIS — D68.59 HYPERCOAGULABLE STATE (HCC): ICD-10-CM

## 2018-11-15 DIAGNOSIS — R14.0 FLATULENCE/GAS PAIN/BELCHING: ICD-10-CM

## 2018-11-15 DIAGNOSIS — I25.83 CORONARY ARTERY DISEASE DUE TO LIPID RICH PLAQUE: ICD-10-CM

## 2018-11-15 DIAGNOSIS — E78.5 HYPERLIPIDEMIA LDL GOAL <100: ICD-10-CM

## 2018-11-15 DIAGNOSIS — K21.9 GASTROESOPHAGEAL REFLUX DISEASE, ESOPHAGITIS PRESENCE NOT SPECIFIED: ICD-10-CM

## 2018-11-15 DIAGNOSIS — Z79.4 TYPE 2 DIABETES MELLITUS WITHOUT COMPLICATION, WITH LONG-TERM CURRENT USE OF INSULIN (HCC): ICD-10-CM

## 2018-11-15 DIAGNOSIS — I10 ESSENTIAL HYPERTENSION: Primary | ICD-10-CM

## 2018-11-15 DIAGNOSIS — Z95.820 S/P ANGIOPLASTY WITH STENT: ICD-10-CM

## 2018-11-15 DIAGNOSIS — R05.3 CHRONIC COUGH: ICD-10-CM

## 2018-11-15 RX ORDER — FAMOTIDINE 20 MG/1
TABLET, FILM COATED ORAL
COMMUNITY
Start: 2018-08-30 | End: 2018-11-25 | Stop reason: SDUPTHER

## 2018-11-15 RX ORDER — BECLOMETHASONE DIPROPIONATE HFA 80 UG/1
AEROSOL, METERED RESPIRATORY (INHALATION)
Refills: 0 | COMMUNITY
Start: 2018-10-31 | End: 2019-08-28

## 2018-11-15 RX ORDER — BENZONATATE 200 MG/1
CAPSULE ORAL
Refills: 0 | COMMUNITY
Start: 2018-10-31 | End: 2019-08-28

## 2018-11-15 RX ORDER — NITROGLYCERIN 0.4 MG/1
TABLET SUBLINGUAL
Refills: 1 | COMMUNITY
Start: 2018-09-05

## 2018-11-15 NOTE — PROGRESS NOTES
Subjective: Chief Complaint Patient presents with  Diabetes 3 month follow-up He  is a 80 y.o. male who presents for evaluation of: Has recently been having a lot of trouble with his gas and bloating. He reports starting Ozempic  Recently which may have made symptoms slightly worse but reports having symptoms prior to this as well. Previously been worked up by GI with Dr. Fitz Ca including EGD. He does have known GERD and is taking Prevacid and Pepcid. Notes from Dr. Fitz Ca also mention empirically treating for SI bacterial overgrowth with Augmentin but this did not seem to improve symptoms any further per patient. He also reports eating numerous foods that are high gas producing foods when questioned on diet. Declines seeing dietitian Anti-coag - Chronically anti-coagulated after having 2 episodes of VTE. See anti-coag tab for history and dosing with plan. Has done well with home INR checks. Prev had NSTEMI and had another cardiac cath that looked ok including no stenosis of recent stenting. Following with Dr. Maricruz Recinos. Pmhx sig for DM2 (seeing Dr. Erika Strickland), VTE x 2 on chronic coumadin, GERD, and chronic pain syndrome from back issue many yrs ago. Diabetes has been well controlled with Lantus, Metformin, and Ozempic ROS Gen - no fever/chills Resp - to see Dr. Karthik Rothman for chronic cough CV - no chest pain or DIAZ Rest per HPI Past Medical History:  
Diagnosis Date  Arthritis  Back injury 1994  CAD (coronary artery disease) Stent replacement  Diabetes ([address]  GERD (gastroesophageal reflux disease)  Hypertension  Muscle cramps  PUD (peptic ulcer disease)  Reflux  Thromboembolus ([address] L BLE Past Surgical History:  
Procedure Laterality Date  HX ORTHOPAEDIC Left Crushed/left index finger amputee  HX ORTHOPAEDIC    
 heal spur removed left foot  HX OTHER SURGICAL    
 hand surgery, heal spur L  
  UPPER GI ENDOSCOPY,BIOPSY  5/16/2018  UPPER GI ENDOSCOPY,DILATN W GUIDE  5/16/2018 Current Outpatient Medications on File Prior to Visit Medication Sig Dispense Refill  nitroglycerin (NITROSTAT) 0.4 mg SL tablet place 1 tablet under the tongue AT FIRST SIGN OF ATTACK, MAY REPE. ..  (REFER TO PRESCRIPTION NOTES). 1  
 QVAR REDIHALER 80 mcg/actuation HFAb inhaler inhale 1 puff by mouth twice a day  0  
 benzonatate (TESSALON) 200 mg capsule take 1 capsule by mouth twice a day  0  
 famotidine (PEPCID) 20 mg tablet  warfarin (COUMADIN) 4 mg tablet Take 2 Tabs by mouth every evening. 30 Tab 0  Telmisartan-amLODIPine 80-5 mg tab TAKE 1 TABLET NIGHTLY FOR BLOOD PRESSURE 30 Tab 0  
 ALTAGRACIA PEN NEEDLE 32 gauge x 5/32\" ndle  semaglutide (OZEMPIC) 0.25 mg/0.2 mL (2 mg/1.5 mL) sub-q pen 0.5 mg by SubCUTAneous route every seven (7) days. Replaces Bydureon. For diabetes. 3 Box 3  
 aspirin 81 mg chewable tablet Take 1 Tab by mouth daily. 90 Tab 1  
 metoprolol succinate (TOPROL-XL) 25 mg XL tablet Take 1 Tab by mouth daily. 90 Tab 3  
 magnesium oxide (MAG-OX) 400 mg tablet Take 800 mg by mouth two (2) times a day.  finasteride (PROSCAR) 5 mg tablet TAKE 1 TABLET DAILY FOR SYMPTOMATIC BENIGN PROSTATIC HYPERPLASIA 90 Tab 1  
 diclofenac (VOLTAREN) 1 % gel Apply  to affected area four (4) times daily. 100 g 5  clopidogrel (PLAVIX) 75 mg tab Take 1 Tab by mouth daily. 30 Tab 11  
 metFORMIN (GLUCOPHAGE) 1,000 mg tablet Take 1 Tab by mouth two (2) times a day. 180 Tab 1  
 lansoprazole (PREVACID) 30 mg capsule Take 1 Cap by mouth Daily (before breakfast). 90 Cap 1  
 insulin glargine (LANTUS SOLOSTAR) 100 unit/mL (3 mL) inpn 55 Units by SubCUTAneous route daily. 15 Pen 3  cholecalciferol, VITAMIN D3, (VITAMIN D3) 5,000 unit tab tablet Take 1,000 Units by mouth daily. Nature Made  cyanocobalamin (VITAMIN B-12) 1,000 mcg tablet Take 1,000 mcg by mouth daily. Nature Made  cyclobenzaprine (FLEXERIL) 10 mg tablet Take 10 mg by mouth three (3) times daily as needed for Muscle Spasm(s). No current facility-administered medications on file prior to visit. Objective:  
 
Vitals:  
 11/15/18 8945 BP: 147/72 Pulse: 79 Resp: 16 Temp: 97.3 °F (36.3 °C) TempSrc: Oral  
SpO2: 97% Weight: 272 lb 9.6 oz (123.7 kg) Height: 5' 11\" (1.803 m) Physical Examination: 
General appearance - alert, well appearing, and in no distress Eyes -sclera anicteric Neck - supple, no significant adenopathy, no thyromegaly Chest - clear to auscultation, no wheezes, rales or rhonchi, symmetric air entry Heart - normal rate, regular rhythm, normal S1, S2, no murmurs, rubs, clicks or gallops Neurological - alert, oriented, no focal findings or movement disorder noted Extr - trace edema Assessment/ Plan:  
Diagnoses and all orders for this visit: 
 
Patient requests to get all of his labs drawn during his appointment with Dr. Sarah Madera to minimize his co-pay. 1. Essential hypertensionBP running a little bit too high today. Generally seems to be appropriately controlled. Will recheck at next appointment 2. Flatulence/gas pain/belchinggiven handout on avoiding high gas producing foods. Discussed having him see a dietitian. Also encouraged further weight loss. Has not had much benefit with simethicone in the past. 
 
3. Hypercoagulable state ([address] 4. History of venous thromboembolism Well controlled INR with home checks 5. Coronary artery disease due to lipid rich plaque 6. Hyperlipidemia LDL goal <100 
7. S/P angioplasty with stent Followed by cardiology 8. Type 2 diabetes mellitus without complication, with long-term current use of insulin (HCC)controlled and followed by Dr. Sarah Madera 9. Obesity, morbid (HCC)encouraged diet and exercise. Patient has been able to lose 5 pounds since last appointment. Discussed the patient's BMI. The BMI follow up plan is as follows:  
dietary management education, guidance, and counseling 
encourage exercise 
monitor weight 
prescribed dietary intake 10. Chronic coughfollowing with Dr. Johnsie Peabody and will have CT scan soon 11. Gastroesophageal reflux disease, esophagitis presence not specifiedencouraged continued use of Prevacid and Pepcid as well as major dietary changes and working on weight loss. I have discussed the diagnosis with the patient and the intended plan as seen in the above orders. The patient has received an after-visit summary and questions were answered concerning future plans. I have discussed medication side effects and warnings with the patient as well. The patient verbalizes understanding and agreement with the plan. Follow-up Disposition: 
Return in about 3 months (around 2/15/2019).

## 2018-11-15 NOTE — PROGRESS NOTES
Chief Complaint Patient presents with  Diabetes 3 month follow-up 1. Have you been to the ER, urgent care clinic since your last visit? Hospitalized since your last visit? No 
 
2. Have you seen or consulted any other health care providers outside of the [facility] since your last visit? Include any pap smears or colon screening. No  
Mother  18 @ 8 years old

## 2018-11-19 ENCOUNTER — HOSPITAL ENCOUNTER (OUTPATIENT)
Dept: CT IMAGING | Age: 83
Discharge: HOME | End: 2018-11-19
Attending: INTERNAL MEDICINE
Payer: MEDICARE

## 2018-11-19 DIAGNOSIS — J84.113 IDIOPATHIC NON-SPECIFIC INTERSTITIAL PNEUMONITIS (HCC): ICD-10-CM

## 2018-11-19 PROCEDURE — 71250 CT THORAX DX C-: CPT

## 2018-11-21 ENCOUNTER — OFFICE VISIT (OUTPATIENT)
Dept: ENDOCRINOLOGY | Age: 83
End: 2018-11-21

## 2018-11-21 VITALS
HEART RATE: 86 BPM | DIASTOLIC BLOOD PRESSURE: 73 MMHG | HEIGHT: 71 IN | OXYGEN SATURATION: 97 % | SYSTOLIC BLOOD PRESSURE: 131 MMHG | BODY MASS INDEX: 37.18 KG/M2 | WEIGHT: 265.6 LBS | RESPIRATION RATE: 16 BRPM

## 2018-11-21 DIAGNOSIS — E83.42 HYPOMAGNESEMIA: ICD-10-CM

## 2018-11-21 DIAGNOSIS — E78.5 DYSLIPIDEMIA: ICD-10-CM

## 2018-11-21 DIAGNOSIS — I10 ESSENTIAL HYPERTENSION: ICD-10-CM

## 2018-11-21 DIAGNOSIS — I25.83 CORONARY ARTERY DISEASE DUE TO LIPID RICH PLAQUE: ICD-10-CM

## 2018-11-21 DIAGNOSIS — I25.10 CORONARY ARTERY DISEASE DUE TO LIPID RICH PLAQUE: ICD-10-CM

## 2018-11-21 DIAGNOSIS — E11.21 TYPE 2 DIABETES WITH NEPHROPATHY (HCC): Primary | ICD-10-CM

## 2018-11-21 NOTE — PATIENT INSTRUCTIONS
Diabetes type II. Continue metformin   Continue Ozempic 0.5 mg daily. Add Jardiance 10 mg daily. This may increase urination. Increase water intake. If you develop a yeast infection (irritated skin) around penis, then let me know. ** Decrease Lantus to 45 units when Jardiance is added. Continue dietary efforts and weight loss efforts    ** If you have values less than 90, then decrease Lantus in 5 unit increments weekly. Blood pressure:  Continue amlodipine + telmisartan - > take at bedtime    Low magnesium:  Continue taking 1600 mg per day. See if you can take 400 mg breakfast, lunch, supper and bedtime OR  Continue 800 mg twice daily.

## 2018-11-21 NOTE — PROGRESS NOTES
Michael Garay is a 80 y.o. male      Chief Complaint   Patient presents with    Follow-up    Diabetes       1. Have you been to the ER, urgent care clinic since your last visit? Hospitalized since your last visit? No    2. Have you seen or consulted any other health care providers outside of the [address] [place] [address] since your last visit? Include any pap smears or colon screening.  No

## 2018-11-21 NOTE — PROGRESS NOTES
History of Present Illness: July Atkins is a 80 y.o. male presents for follow-up of diabetes. He has had diabetes for 20 + years. He also has CAD. Admission in June/2018 for required stent placement, then in 8/2018 for NSTEMI presumed to be due to vasospasm. .     Diabetes related complications:  Neuropathy: + mild sx of neuropathy. No other known complications. A1c 6.4 in 8/2018    Current diabetes regimen:  Lantus 55 units   Metformin 1 g BID  Ozempic 0.5 mg replaced Bydureon.   - does have some nausea periodically. Reports having emesis on infrequently as well. Weight is down 12 lbs since 8/2018. Feels appetite is less    Glucoses:   111-120s. Fasting. BMI 37 - see above. Wt down 12 lbs. Trying to limit sweets. Struggles with cravings for sweets. Lipids - tried several statins, had very bothersome muscle cramps. Unwilling to try again    HTN : amlodipine/telmisartan. Blood pressure well controlled  Microalbuminuria - + off and on. Hypomagnesemia - 800 mg twice daily. Reflux - did not tolerate tapering off Prevacid. Sleeping better. May use rest room 1-2 times/night (nocturia improved)    Social:  . Also has brother in law who he helps care for. Past Medical History:   Diagnosis Date    Arthritis     Back injury 12    CAD (coronary artery disease)     Stent replacement    Diabetes ([facility]     GERD (gastroesophageal reflux disease)     Hypertension     Muscle cramps     PUD (peptic ulcer disease)     Reflux     Thromboembolus (HCC)     L BLE     Current Outpatient Medications   Medication Sig    nitroglycerin (NITROSTAT) 0.4 mg SL tablet place 1 tablet under the tongue AT FIRST SIGN OF ATTACK, MAY REPE. ..  (REFER TO PRESCRIPTION NOTES).     QVAR REDIHALER 80 mcg/actuation HFAb inhaler inhale 1 puff by mouth twice a day    benzonatate (TESSALON) 200 mg capsule take 1 capsule by mouth twice a day    famotidine (PEPCID) 20 mg tablet     warfarin (COUMADIN) 4 mg tablet Take 2 Tabs by mouth every evening.  Telmisartan-amLODIPine 80-5 mg tab TAKE 1 TABLET NIGHTLY FOR BLOOD PRESSURE    ALTAGRACIA PEN NEEDLE 32 gauge x 5/32\" ndle     semaglutide (OZEMPIC) 0.25 mg/0.2 mL (2 mg/1.5 mL) sub-q pen 0.5 mg by SubCUTAneous route every seven (7) days. Replaces Bydureon. For diabetes.  aspirin 81 mg chewable tablet Take 1 Tab by mouth daily.  metoprolol succinate (TOPROL-XL) 25 mg XL tablet Take 1 Tab by mouth daily.  magnesium oxide (MAG-OX) 400 mg tablet Take 800 mg by mouth two (2) times a day.  finasteride (PROSCAR) 5 mg tablet TAKE 1 TABLET DAILY FOR SYMPTOMATIC BENIGN PROSTATIC HYPERPLASIA    diclofenac (VOLTAREN) 1 % gel Apply  to affected area four (4) times daily.  clopidogrel (PLAVIX) 75 mg tab Take 1 Tab by mouth daily.  metFORMIN (GLUCOPHAGE) 1,000 mg tablet Take 1 Tab by mouth two (2) times a day.  lansoprazole (PREVACID) 30 mg capsule Take 1 Cap by mouth Daily (before breakfast).  insulin glargine (LANTUS SOLOSTAR) 100 unit/mL (3 mL) inpn 55 Units by SubCUTAneous route daily.  cholecalciferol, VITAMIN D3, (VITAMIN D3) 5,000 unit tab tablet Take 1,000 Units by mouth daily. Nature Made     cyanocobalamin (VITAMIN B-12) 1,000 mcg tablet Take 1,000 mcg by mouth daily. Nature Made    cyclobenzaprine (FLEXERIL) 10 mg tablet Take 10 mg by mouth three (3) times daily as needed for Muscle Spasm(s). No current facility-administered medications for this visit. Allergies   Allergen Reactions    Statins-Hmg-Coa Reductase Inhibitors Myalgia     Tried several statins.         Review of Systems:  - Eyes: no blurry vision or double vision  - Cardiovascular: no chest pain  - Respiratory: no shortness of breath  - Musculoskeletal: no myalgias  - Neurological: no numbness/tingling in extremities    Physical Examination:  Visit Vitals  /73 (BP 1 Location: Left arm, BP Patient Position: Sitting)   Pulse 86   Resp 16   Ht 5' 11\" (1.803 m)   Wt 265 lb 9.6 oz (120.5 kg)   SpO2 97%   BMI 37.04 kg/m²   -   - General: pleasant, no distress, normal gait   HEENT: hearing intact, EOMI, clear sclera without icterus  - Cardiovascular: regular, normal rate   - Respiratory: normal effort  - Integumentary: no edema  - Psychiatric: normal mood and affect    Data Reviewed:   Component      Latest Ref Rng & Units 8/17/2018 8/17/2018 8/17/2018 8/17/2018           4:56 AM  4:56 AM  4:56 AM  4:56 AM   Sodium      136 - 145 mmol/L       Potassium      3.5 - 5.1 mmol/L       Chloride      97 - 108 mmol/L       CO2      21 - 32 mmol/L       Anion gap      5 - 15 mmol/L       Glucose      65 - 100 mg/dL       BUN      6 - 20 MG/DL       Creatinine      0.70 - 1.30 MG/DL       BUN/Creatinine ratio      12 - 20         GFR est AA      >60 ml/min/1.73m2       GFR est non-AA      >60 ml/min/1.73m2       Calcium      8.5 - 10.1 MG/DL       Cholesterol, total      <200 MG/      Triglyceride      <150 MG/ (H)      HDL Cholesterol      MG/DL 33      LDL, calculated      0 - 100 MG/.2 (H)      VLDL, calculated      MG/DL 35.8      CHOL/HDL Ratio      0 - 5.0   5.5 (H)      Hemoglobin A1c, (calculated)      4.2 - 6.3 %  6.4 (H)     Est. average glucose      mg/dL  137     TSH      0.36 - 3.74 uIU/mL   2.74    Magnesium      1.6 - 2.4 mg/dL    1.6     Component      Latest Ref Rng & Units 8/17/2018           4:56 AM   Sodium      136 - 145 mmol/L 137   Potassium      3.5 - 5.1 mmol/L 4.5   Chloride      97 - 108 mmol/L 106   CO2      21 - 32 mmol/L 23   Anion gap      5 - 15 mmol/L 8   Glucose      65 - 100 mg/dL 134 (H)   BUN      6 - 20 MG/DL 16   Creatinine      0.70 - 1.30 MG/DL 1.06   BUN/Creatinine ratio      12 - 20   15   GFR est AA      >60 ml/min/1.73m2 >60   GFR est non-AA      >60 ml/min/1.73m2 >60   Calcium      8.5 - 10.1 MG/DL 9.0     Assessment/Plan:   1.  Type 2 diabetes with nephropathy ([facility]   - labs today  - add Jardiance for CV indication and to decrease insulin needs and improve control  - continue Ozempic and metformin  - lower Lantus dose to 45 units when Jardiance is added. Further lower insulin for values < 90.   2. Essential hypertension - controlled. Continue amlodipine/telmisartan   3. Hypomagnesemia - reassess   4. Dyslipidemia   - intolerant of statins  - encouraged weight loss and high fiber diet. 5. Coronary artery disease due to lipid rich plaque   - Jardiance has CV indication  - he has not tolerated statins   6. BMI 37.0-37.9, adult   - encouraged smaller portions. GLP-1, SGLT2 inhibitor should help with weight. Lower insulin dose will help too. Patient Instructions   Diabetes type II. Continue metformin   Continue Ozempic 0.5 mg daily. Add Jardiance 10 mg daily. This may increase urination. Increase water intake. If you develop a yeast infection (irritated skin) around penis, then let me know. ** Decrease Lantus to 45 units when Jardiance is added. Continue dietary efforts and weight loss efforts    ** If you have values less than 90, then decrease Lantus in 5 unit increments weekly. Blood pressure:  Continue amlodipine + telmisartan - > take at bedtime    Low magnesium:  Continue taking 1600 mg per day. See if you can take 400 mg breakfast, lunch, supper and bedtime OR  Continue 800 mg twice daily. Follow-up Disposition:  Return in about 3 months (around 2/21/2019).     Copy sent to:

## 2018-11-22 LAB
ALBUMIN SERPL-MCNC: 4.1 G/DL (ref 3.5–4.7)
ALBUMIN/CREAT UR: 30.6 MG/G CREAT (ref 0–30)
ALBUMIN/GLOB SERPL: 1.2 {RATIO} (ref 1.2–2.2)
ALP SERPL-CCNC: 89 IU/L (ref 39–117)
ALT SERPL-CCNC: 17 IU/L (ref 0–44)
AST SERPL-CCNC: 40 IU/L (ref 0–40)
BILIRUB SERPL-MCNC: 0.3 MG/DL (ref 0–1.2)
BUN SERPL-MCNC: 13 MG/DL (ref 8–27)
BUN/CREAT SERPL: 11 (ref 10–24)
CALCIUM SERPL-MCNC: 9.9 MG/DL (ref 8.6–10.2)
CHLORIDE SERPL-SCNC: 97 MMOL/L (ref 96–106)
CHOLEST SERPL-MCNC: 200 MG/DL (ref 100–199)
CO2 SERPL-SCNC: 21 MMOL/L (ref 20–29)
CREAT SERPL-MCNC: 1.14 MG/DL (ref 0.76–1.27)
CREAT UR-MCNC: 132.8 MG/DL
EST. AVERAGE GLUCOSE BLD GHB EST-MCNC: 128 MG/DL
GLOBULIN SER CALC-MCNC: 3.4 G/DL (ref 1.5–4.5)
GLUCOSE SERPL-MCNC: 161 MG/DL (ref 65–99)
HBA1C MFR BLD: 6.1 % (ref 4.8–5.6)
HDLC SERPL-MCNC: 44 MG/DL
INTERPRETATION, 910389: NORMAL
INTERPRETATION: NORMAL
LDLC SERPL CALC-MCNC: 124 MG/DL (ref 0–99)
Lab: NORMAL
MAGNESIUM SERPL-MCNC: 1.4 MG/DL (ref 1.6–2.3)
MICROALBUMIN UR-MCNC: 40.7 UG/ML
PDF IMAGE, 910387: NORMAL
POTASSIUM SERPL-SCNC: 4.9 MMOL/L (ref 3.5–5.2)
PROT SERPL-MCNC: 7.5 G/DL (ref 6–8.5)
SODIUM SERPL-SCNC: 133 MMOL/L (ref 134–144)
TRIGL SERPL-MCNC: 159 MG/DL (ref 0–149)
VLDLC SERPL CALC-MCNC: 32 MG/DL (ref 5–40)

## 2018-11-25 DIAGNOSIS — I10 ESSENTIAL HYPERTENSION: ICD-10-CM

## 2018-11-27 RX ORDER — TELMISARTAN AND AMLODIPINE 5; 80 MG/1; MG/1
TABLET ORAL
Qty: 90 TAB | Refills: 1 | Status: SHIPPED | OUTPATIENT
Start: 2018-11-27 | End: 2019-05-01 | Stop reason: ALTCHOICE

## 2018-11-27 RX ORDER — FAMOTIDINE 20 MG/1
TABLET, FILM COATED ORAL
Qty: 180 TAB | Refills: 3 | Status: SHIPPED | OUTPATIENT
Start: 2018-11-27 | End: 2019-08-28

## 2018-11-27 RX ORDER — WARFARIN SODIUM 4 MG/1
TABLET ORAL
Qty: 180 TAB | Refills: 2 | Status: SHIPPED | OUTPATIENT
Start: 2018-11-27 | End: 2018-11-30 | Stop reason: SDUPTHER

## 2018-12-04 ENCOUNTER — DOCUMENTATION ONLY (OUTPATIENT)
Dept: FAMILY MEDICINE CLINIC | Age: 83
End: 2018-12-04

## 2018-12-05 RX ORDER — WARFARIN SODIUM 4 MG/1
TABLET ORAL
Qty: 180 TAB | Refills: 0 | Status: SHIPPED | OUTPATIENT
Start: 2018-12-05 | End: 2019-03-04 | Stop reason: SDUPTHER

## 2018-12-17 ENCOUNTER — DOCUMENTATION ONLY (OUTPATIENT)
Dept: INTERNAL MEDICINE CLINIC | Age: 83
End: 2018-12-17

## 2019-01-14 DIAGNOSIS — Z79.4 TYPE 2 DIABETES MELLITUS WITHOUT COMPLICATION, WITH LONG-TERM CURRENT USE OF INSULIN (HCC): ICD-10-CM

## 2019-01-14 DIAGNOSIS — E11.9 TYPE 2 DIABETES MELLITUS WITHOUT COMPLICATION, WITH LONG-TERM CURRENT USE OF INSULIN (HCC): ICD-10-CM

## 2019-01-14 DIAGNOSIS — K21.9 GASTROESOPHAGEAL REFLUX DISEASE, ESOPHAGITIS PRESENCE NOT SPECIFIED: ICD-10-CM

## 2019-01-15 RX ORDER — LANSOPRAZOLE 30 MG/1
CAPSULE, DELAYED RELEASE ORAL
Qty: 90 CAP | Refills: 1 | Status: SHIPPED | OUTPATIENT
Start: 2019-01-15 | End: 2019-07-23 | Stop reason: SDUPTHER

## 2019-01-15 RX ORDER — METFORMIN HYDROCHLORIDE 1000 MG/1
TABLET ORAL
Qty: 180 TAB | Refills: 1 | Status: SHIPPED | OUTPATIENT
Start: 2019-01-15 | End: 2019-07-22 | Stop reason: SDUPTHER

## 2019-02-11 DIAGNOSIS — N40.0 BENIGN PROSTATIC HYPERPLASIA, UNSPECIFIED WHETHER LOWER URINARY TRACT SYMPTOMS PRESENT: ICD-10-CM

## 2019-02-12 RX ORDER — FINASTERIDE 5 MG/1
TABLET, FILM COATED ORAL
Qty: 90 TAB | Refills: 1 | Status: SHIPPED | OUTPATIENT
Start: 2019-02-12 | End: 2019-07-22 | Stop reason: SDUPTHER

## 2019-02-12 RX ORDER — INSULIN GLARGINE 100 [IU]/ML
INJECTION, SOLUTION SUBCUTANEOUS
Qty: 60 ML | Refills: 3 | Status: SHIPPED | OUTPATIENT
Start: 2019-02-12 | End: 2019-12-02 | Stop reason: SDUPTHER

## 2019-03-05 RX ORDER — WARFARIN SODIUM 4 MG/1
TABLET ORAL
Qty: 180 TAB | Refills: 0 | Status: SHIPPED | OUTPATIENT
Start: 2019-03-05 | End: 2019-06-10 | Stop reason: SDUPTHER

## 2019-03-25 ENCOUNTER — OFFICE VISIT (OUTPATIENT)
Dept: FAMILY MEDICINE CLINIC | Age: 84
End: 2019-03-25

## 2019-03-25 VITALS
BODY MASS INDEX: 37.57 KG/M2 | HEART RATE: 72 BPM | TEMPERATURE: 97.2 F | RESPIRATION RATE: 18 BRPM | HEIGHT: 71 IN | SYSTOLIC BLOOD PRESSURE: 116 MMHG | OXYGEN SATURATION: 98 % | DIASTOLIC BLOOD PRESSURE: 61 MMHG | WEIGHT: 268.4 LBS

## 2019-03-25 DIAGNOSIS — D68.59 HYPERCOAGULABLE STATE (HCC): ICD-10-CM

## 2019-03-25 DIAGNOSIS — Z79.4 TYPE 2 DIABETES MELLITUS WITHOUT COMPLICATION, WITH LONG-TERM CURRENT USE OF INSULIN (HCC): ICD-10-CM

## 2019-03-25 DIAGNOSIS — J84.89 INTERSTITIAL PNEUMONITIS (HCC): ICD-10-CM

## 2019-03-25 DIAGNOSIS — Z95.820 S/P ANGIOPLASTY WITH STENT: ICD-10-CM

## 2019-03-25 DIAGNOSIS — Z86.718 HISTORY OF VENOUS THROMBOEMBOLISM: ICD-10-CM

## 2019-03-25 DIAGNOSIS — I10 ESSENTIAL HYPERTENSION: Primary | ICD-10-CM

## 2019-03-25 DIAGNOSIS — E78.5 HYPERLIPIDEMIA LDL GOAL <100: ICD-10-CM

## 2019-03-25 DIAGNOSIS — E11.9 TYPE 2 DIABETES MELLITUS WITHOUT COMPLICATION, WITH LONG-TERM CURRENT USE OF INSULIN (HCC): ICD-10-CM

## 2019-03-25 DIAGNOSIS — I25.83 CORONARY ARTERY DISEASE DUE TO LIPID RICH PLAQUE: ICD-10-CM

## 2019-03-25 DIAGNOSIS — Z79.01 ENCOUNTER FOR MONITORING COUMADIN THERAPY: ICD-10-CM

## 2019-03-25 DIAGNOSIS — Z51.81 ENCOUNTER FOR MONITORING COUMADIN THERAPY: ICD-10-CM

## 2019-03-25 DIAGNOSIS — I25.10 CORONARY ARTERY DISEASE DUE TO LIPID RICH PLAQUE: ICD-10-CM

## 2019-03-25 LAB
INR BLD: 1.5
PT POC: NORMAL SECONDS
VALID INTERNAL CONTROL?: YES

## 2019-03-25 NOTE — PROGRESS NOTES
Chief Complaint Patient presents with  Follow-up PT/INR 1. Have you been to the ER, urgent care clinic since your last visit? Hospitalized since your last visit? No 
 
2. Have you seen or consulted any other health care providers outside of the [address] [place] [address] since your last visit? Include any pap smears or colon screening. Yes Where: Dr Delvis Crowell Discuss discomfort in chest with breathing /Dr abbott plans breathing test 5/2019

## 2019-03-25 NOTE — PROGRESS NOTES
Subjective: Chief Complaint Patient presents with  Follow-up PT/INR He  is a 80 y.o. male who presents for evaluation of: Doing well today. No major concerns. 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 He is working with Dr. Fritzi Aase on this. Had CT Chest on 11/19/19 that came back with \"Stable fibrotic lung disease, not IPF/UIP pattern. Mild volume loss; no traction bronchiectasis. \"  Largely thought to be related to work exposure over the years. Will have PFTs soon. Prev had NSTEMI and had another cardiac cath 8/2018 that looked ok including no stenosis of prev stenting. Following with Dr. Jenny Talbot. 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 See anti-coag tab for history and dosing with plan. Has done well with home INR checks. DM2 - well controlled, seeing Dr. Cheryl Chapman. Ct on Lantus, Metformin, Jardiance, and Ozempic. Ct to work on weight loss. ROS Gen - no fever/chills Resp - per HPI 
CV - no chest pain GI - chronic GERD issues and intolerant of pulling off PPI Rest per HPI Past Medical History:  
Diagnosis Date  Arthritis  Back injury 1994  CAD (coronary artery disease) Stent replacement  Diabetes ([address]  GERD (gastroesophageal reflux disease)  Hypertension  Muscle cramps  PUD (peptic ulcer disease)  Reflux  Thromboembolus ([facility] L BLE Past Surgical History:  
Procedure Laterality Date  HX ORTHOPAEDIC Left Crushed/left index finger amputee  HX ORTHOPAEDIC    
 heal spur removed left foot  HX OTHER SURGICAL    
 hand surgery, heal spur L  
 UPPER GI ENDOSCOPY,BIOPSY  5/16/2018  UPPER GI ENDOSCOPY,DILATN W GUIDE  5/16/2018 Current Outpatient Medications on File Prior to Visit Medication Sig Dispense Refill  COUMADIN 4 mg tablet TAKE 2 TABLETS DAILY 180 Tab 0  
 finasteride (PROSCAR) 5 mg tablet TAKE 1 TABLET DAILY FOR SYMPTOMATIC BENIGN PROSTATIC HYPERPLASIA 90 Tab 1  
 LANTUS SOLOSTAR U-100 INSULIN 100 unit/mL (3 mL) inpn INJECT 55 UNITS UNDER THE SKIN DAILY (DOSAGE CHANGE) (Patient taking differently: INJECT 50 UNITS UNDER THE SKIN DAILY (DOSAGE CHANGE)) 60 mL 3  
 ALTAGRACIA PEN NEEDLE 32 gauge x 5/32\" ndle USE 1 NEEDLE UNDER THE SKIN DAILY 100 Pen Needle 3  
 lansoprazole (PREVACID) 30 mg capsule TAKE 1 CAPSULE DAILY BEFORE BREAKFAST 90 Cap 1  
 metFORMIN (GLUCOPHAGE) 1,000 mg tablet TAKE 1 TABLET TWICE A  Tab 1  
 famotidine (PEPCID) 20 mg tablet TAKE 1 TABLET TWICE A DAY FOR STOMACH ACID 180 Tab 3  Telmisartan-amLODIPine 80-5 mg tab TAKE 1 TABLET NIGHTLY FOR BLOOD PRESSURE 90 Tab 1  
 empagliflozin (JARDIANCE) 10 mg tablet Take 1 Tab by mouth daily. For diabetes 90 Tab 3  QVAR REDIHALER 80 mcg/actuation HFAb inhaler inhale 1 puff by mouth twice a day  0  
 benzonatate (TESSALON) 200 mg capsule take 1 capsule by mouth twice a day  0  
 ALTAGRACIA PEN NEEDLE 32 gauge x 5/32\" ndle  semaglutide (OZEMPIC) 0.25 mg/0.2 mL (2 mg/1.5 mL) sub-q pen 0.5 mg by SubCUTAneous route every seven (7) days. Replaces Bydureon. For diabetes. 3 Box 3  
 aspirin 81 mg chewable tablet Take 1 Tab by mouth daily. 90 Tab 1  
 metoprolol succinate (TOPROL-XL) 25 mg XL tablet Take 1 Tab by mouth daily. 90 Tab 3  
 magnesium oxide (MAG-OX) 400 mg tablet Take 800 mg by mouth two (2) times a day.  diclofenac (VOLTAREN) 1 % gel Apply  to affected area four (4) times daily. 100 g 5  clopidogrel (PLAVIX) 75 mg tab Take 1 Tab by mouth daily. 30 Tab 11  
 cholecalciferol, VITAMIN D3, (VITAMIN D3) 5,000 unit tab tablet Take 1,000 Units by mouth daily. Nature Made  cyanocobalamin (VITAMIN B-12) 1,000 mcg tablet Take 1,000 mcg by mouth daily. Nature Made  cyclobenzaprine (FLEXERIL) 10 mg tablet Take 10 mg by mouth three (3) times daily as needed for Muscle Spasm(s).  nitroglycerin (NITROSTAT) 0.4 mg SL tablet place 1 tablet under the tongue AT FIRST SIGN OF ATTACK, MAY REPE. ..  (REFER TO PRESCRIPTION NOTES). 1  
 
No current facility-administered medications on file prior to visit. Objective:  
 
Vitals:  
 03/25/19 1036 BP: 116/61 Pulse: 72 Resp: 18 Temp: 97.2 °F (36.2 °C) TempSrc: Oral  
SpO2: 98% Weight: 268 lb 6.4 oz (121.7 kg) Height: 5' 11\" (1.803 m) Physical Examination: 
General appearance - alert, well appearing, and in no distress Eyes -sclera anicteric Neck - supple, no significant adenopathy, no thyromegaly Chest - clear to auscultation, no wheezes, rales or rhonchi, symmetric air entry Heart - normal rate, regular rhythm, normal S1, S2, no murmurs, rubs, clicks or gallops Neurological - alert, oriented, no focal findings or movement disorder noted Extr - trace edema Assessment/ Plan:  
Diagnoses and all orders for this visit: 1. Essential hypertension - controlled 2. Hypercoagulable state ([facility] [address] 3. Encounter for monitoring Coumadin therapy  hx of VTE x 2, Well controlled INR with home checks but INR off today. Adjusted coumadin dose - f/u in 1 week unless home INR check is back on track. 
-     AMB POC PT/INR 4. Coronary artery disease due to lipid rich plaque 5. S/P angioplasty with stent 6. Hyperlipidemia LDL goal <100 
- statin intolerant, not worth trying to get PCSK9 based on age and overall health at this point. Followed by cardiology 7. Interstitial pneumonitis ([facility] [address] - being followed by Pulm. Recent notes reviewed. To have PFTs with Dr. Curt camilo 8. Type 2 diabetes mellitus without complication, with long-term current use of insulin (HCC)controlled and followed by Dr. Alexys Marte 9. BMI 37.0-37.9, adult Discussed the patient's BMI. The BMI follow up plan is as follows:  
dietary management education, guidance, and counseling 
encourage exercise 
monitor weight 
prescribed dietary intake I have discussed the diagnosis with the patient and the intended plan as seen in the above orders. The patient has received an after-visit summary and questions were answered concerning future plans. I have discussed medication side effects and warnings with the patient as well. The patient verbalizes understanding and agreement with the plan. Follow-up and Dispositions · Return in about 1 week (around 4/1/2019).

## 2019-04-17 ENCOUNTER — TELEPHONE (OUTPATIENT)
Dept: FAMILY MEDICINE CLINIC | Age: 84
End: 2019-04-17

## 2019-04-17 RX ORDER — INDOMETHACIN 50 MG/1
50 CAPSULE ORAL
Qty: 15 CAP | Refills: 0 | Status: SHIPPED | OUTPATIENT
Start: 2019-04-17 | End: 2019-08-28

## 2019-04-17 NOTE — TELEPHONE ENCOUNTER
----- Message from Nirali Baltazar sent at 4/17/2019  3:10 PM EDT -----  Regarding: Dr. Morales Cool: [phone]  Pts wife, Jessica Villegas, checking on stated of callback requested today in regards to pts issues with Gout. Jessica Cejakorina stated they called this morning to speak with nurse. Pts wife made aware of turnaround time. They are requesting a call back today if possible as pt is having a lot of pain with his gout.

## 2019-04-24 ENCOUNTER — TELEPHONE (OUTPATIENT)
Dept: FAMILY MEDICINE CLINIC | Age: 84
End: 2019-04-24

## 2019-04-24 NOTE — TELEPHONE ENCOUNTER
Pt wife is calling for an appt   States he has a bad cough x 3 days with a little bit of blood in what he is bringing up   Also he has had pneumonia before     Best number to reach her is [phone]

## 2019-04-25 ENCOUNTER — OFFICE VISIT (OUTPATIENT)
Dept: FAMILY MEDICINE CLINIC | Age: 84
End: 2019-04-25

## 2019-04-25 VITALS
RESPIRATION RATE: 16 BRPM | DIASTOLIC BLOOD PRESSURE: 56 MMHG | HEIGHT: 71 IN | SYSTOLIC BLOOD PRESSURE: 116 MMHG | OXYGEN SATURATION: 99 % | HEART RATE: 99 BPM | WEIGHT: 265.2 LBS | TEMPERATURE: 97.7 F | BODY MASS INDEX: 37.13 KG/M2

## 2019-04-25 DIAGNOSIS — J06.9 ACUTE URI: Primary | ICD-10-CM

## 2019-04-25 DIAGNOSIS — Z79.01 ANTICOAGULATED ON COUMADIN: ICD-10-CM

## 2019-04-25 RX ORDER — AZITHROMYCIN 250 MG/1
TABLET, FILM COATED ORAL
Qty: 6 TAB | Refills: 0 | Status: SHIPPED | OUTPATIENT
Start: 2019-04-25 | End: 2019-04-30

## 2019-04-25 RX ORDER — PROMETHAZINE HYDROCHLORIDE AND CODEINE PHOSPHATE 6.25; 1 MG/5ML; MG/5ML
1 SOLUTION ORAL
Qty: 35 ML | Refills: 0 | Status: SHIPPED | OUTPATIENT
Start: 2019-04-25 | End: 2019-05-02

## 2019-04-25 NOTE — PROGRESS NOTES
Chief Complaint   Patient presents with    Cough     x 1 week   Blood tinged Yellowish mucus with cough  Hoarseness since Sunday

## 2019-04-25 NOTE — PROGRESS NOTES
Subjective:     Chief Complaint   Patient presents with    Cough     x 1 week        He  is a 80y.o. year old male who presents for evaluation of URI    Upper Respiratory Infection  Patient complains of symptoms of a URI. Symptoms include cough and hoarseness, blood tinged sputum. Onset of symptoms was 1 week ago, unchanged since that time. He is drinking plenty of fluids. Evaluation to date: none. Treatment to date: none. Of note, he is on coumadin. ROS:  Constitutional: per HPI  Eyes: negative for visual disturbance  Ears, nose, mouth, throat, and face: per HPI  Respiratory: per HPI  Cardiovascular: negative for chest pain, dyspnea, palpitations  Gastrointestinal: negative for nausea, vomiting, diarrhea and abdominal pain  Integument/breast: negative for rash    Objective:     Vitals:    19 0816   BP: 116/56   Pulse: 99   Resp: 16   Temp: 97.7 °F (36.5 °C)   TempSrc: Oral   SpO2: 99%   Weight: 265 lb 3.2 oz (120.3 kg)   Height: 5' 11\" (1.803 m)       Physical Examination:   Gen - NAD  Eyes - sclera anicteric  ENT - turbinates inflamed bilat, no sinus tenderness, post pharynx erythematous with no exudate  Resp - CTAB, no w/r/r  CV - rrr, no m/r/g    Allergies   Allergen Reactions    Statins-Hmg-Coa Reductase Inhibitors Myalgia     Tried several statins.        Social History     Socioeconomic History    Marital status:      Spouse name: Not on file    Number of children: Not on file    Years of education: Not on file    Highest education level: Not on file   Tobacco Use    Smoking status: Former Smoker     Packs/day: 1.00     Last attempt to quit: 1975     Years since quittin.3    Smokeless tobacco: Never Used   Substance and Sexual Activity    Alcohol use: Yes     Comment: once in a while    Drug use: No    Sexual activity: Yes     Partners: Female      Family History   Problem Relation Age of Onset    Heart Disease Brother     Bleeding Prob Father     Tuberculosis Sister Past Surgical History:   Procedure Laterality Date    HX ORTHOPAEDIC Left     Crushed/left index finger amputee    HX ORTHOPAEDIC      heal spur removed left foot    HX OTHER SURGICAL      hand surgery, heal spur L    UPPER GI ENDOSCOPY,BIOPSY  5/16/2018         UPPER GI ENDOSCOPY,DILATN W GUIDE  5/16/2018           Past Medical History:   Diagnosis Date    Arthritis     Back injury 1994    CAD (coronary artery disease)     Stent replacement    Diabetes ([address]     GERD (gastroesophageal reflux disease)     Hypertension     Muscle cramps     PUD (peptic ulcer disease)     Reflux     Thromboembolus (HCC)     L BLE      Current Outpatient Medications   Medication Sig Dispense Refill    azithromycin (ZITHROMAX) 250 mg tablet Take 2 tablets today, then take 1 tablet daily 6 Tab 0    indomethacin (INDOCIN) 50 mg capsule Take 1 Cap by mouth three (3) times daily as needed (gout flare). 15 Cap 0    COUMADIN 4 mg tablet TAKE 2 TABLETS DAILY 180 Tab 0    finasteride (PROSCAR) 5 mg tablet TAKE 1 TABLET DAILY FOR SYMPTOMATIC BENIGN PROSTATIC HYPERPLASIA 90 Tab 1    LANTUS SOLOSTAR U-100 INSULIN 100 unit/mL (3 mL) inpn INJECT 55 UNITS UNDER THE SKIN DAILY (DOSAGE CHANGE) (Patient taking differently: INJECT 50 UNITS UNDER THE SKIN DAILY (DOSAGE CHANGE)) 60 mL 3    ALTAGRACIA PEN NEEDLE 32 gauge x 5/32\" ndle USE 1 NEEDLE UNDER THE SKIN DAILY 100 Pen Needle 3    lansoprazole (PREVACID) 30 mg capsule TAKE 1 CAPSULE DAILY BEFORE BREAKFAST 90 Cap 1    metFORMIN (GLUCOPHAGE) 1,000 mg tablet TAKE 1 TABLET TWICE A  Tab 1    famotidine (PEPCID) 20 mg tablet TAKE 1 TABLET TWICE A DAY FOR STOMACH ACID 180 Tab 3    Telmisartan-amLODIPine 80-5 mg tab TAKE 1 TABLET NIGHTLY FOR BLOOD PRESSURE 90 Tab 1    empagliflozin (JARDIANCE) 10 mg tablet Take 1 Tab by mouth daily.  For diabetes 90 Tab 3    QVAR REDIHALER 80 mcg/actuation HFAb inhaler inhale 1 puff by mouth twice a day  0    benzonatate (TESSALON) 200 mg capsule take 1 capsule by mouth twice a day  0    ALTAGRACIA PEN NEEDLE 32 gauge x 5/32\" ndle       semaglutide (OZEMPIC) 0.25 mg/0.2 mL (2 mg/1.5 mL) sub-q pen 0.5 mg by SubCUTAneous route every seven (7) days. Replaces Bydureon. For diabetes. 3 Box 3    aspirin 81 mg chewable tablet Take 1 Tab by mouth daily. 90 Tab 1    metoprolol succinate (TOPROL-XL) 25 mg XL tablet Take 1 Tab by mouth daily. 90 Tab 3    magnesium oxide (MAG-OX) 400 mg tablet Take 800 mg by mouth two (2) times a day.  diclofenac (VOLTAREN) 1 % gel Apply  to affected area four (4) times daily. 100 g 5    clopidogrel (PLAVIX) 75 mg tab Take 1 Tab by mouth daily. 30 Tab 11    cholecalciferol, VITAMIN D3, (VITAMIN D3) 5,000 unit tab tablet Take 1,000 Units by mouth daily. Nature Made       cyanocobalamin (VITAMIN B-12) 1,000 mcg tablet Take 1,000 mcg by mouth daily. Nature Made      cyclobenzaprine (FLEXERIL) 10 mg tablet Take 10 mg by mouth three (3) times daily as needed for Muscle Spasm(s).  nitroglycerin (NITROSTAT) 0.4 mg SL tablet place 1 tablet under the tongue AT FIRST SIGN OF ATTACK, MAY REPE. ..  (REFER TO PRESCRIPTION NOTES). 1        Assessment/ Plan:   Diagnoses and all orders for this visit:    1. Acute URI - using abx and will get CXR if not resolving  -     azithromycin (ZITHROMAX) 250 mg tablet; Take 2 tablets today, then take 1 tablet daily  -     XR CHEST PA LAT; Future    2. Anticoagulated on Coumadin - hold coumadin x 3 days while on abx. Recent INR home reading ok. I have discussed the diagnosis with the patient and the intended plan as seen in the above orders. The patient has received an after-visit summary and questions were answered concerning future plans. I have discussed medication side effects and warnings with the patient as well. The patient verbalizes understanding and agreement with the plan.     Follow-up and Dispositions    · Return in about 1 month (around 5/23/2019), or if symptoms worsen or fail to improve.

## 2019-04-29 ENCOUNTER — TELEPHONE (OUTPATIENT)
Dept: FAMILY MEDICINE CLINIC | Age: 84
End: 2019-04-29

## 2019-04-29 NOTE — TELEPHONE ENCOUNTER
Pt wife calling - needs help with xray he was supposed to get done       Think PSR finally got it straight with her - she will bring him back up her today and get his chest xray done     Best number to reach her is [phone]

## 2019-05-01 ENCOUNTER — HOSPITAL ENCOUNTER (OUTPATIENT)
Dept: GENERAL RADIOLOGY | Age: 84
Discharge: HOME | End: 2019-05-01
Payer: MEDICARE

## 2019-05-01 ENCOUNTER — OFFICE VISIT (OUTPATIENT)
Dept: ENDOCRINOLOGY | Age: 84
End: 2019-05-01

## 2019-05-01 VITALS
DIASTOLIC BLOOD PRESSURE: 51 MMHG | HEIGHT: 71 IN | WEIGHT: 256.6 LBS | BODY MASS INDEX: 35.92 KG/M2 | SYSTOLIC BLOOD PRESSURE: 108 MMHG | HEART RATE: 70 BPM

## 2019-05-01 DIAGNOSIS — E83.42 HYPOMAGNESEMIA: ICD-10-CM

## 2019-05-01 DIAGNOSIS — I25.83 CORONARY ARTERY DISEASE DUE TO LIPID RICH PLAQUE: ICD-10-CM

## 2019-05-01 DIAGNOSIS — I25.10 CORONARY ARTERY DISEASE DUE TO LIPID RICH PLAQUE: ICD-10-CM

## 2019-05-01 DIAGNOSIS — E11.21 TYPE 2 DIABETES WITH NEPHROPATHY (HCC): Primary | ICD-10-CM

## 2019-05-01 DIAGNOSIS — E78.5 DYSLIPIDEMIA: ICD-10-CM

## 2019-05-01 DIAGNOSIS — J06.9 ACUTE URI: ICD-10-CM

## 2019-05-01 DIAGNOSIS — I10 ESSENTIAL HYPERTENSION: ICD-10-CM

## 2019-05-01 LAB
GLUCOSE POC: 215 MG/DL
HBA1C MFR BLD HPLC: 5.8 %

## 2019-05-01 PROCEDURE — 71046 X-RAY EXAM CHEST 2 VIEWS: CPT

## 2019-05-01 RX ORDER — TELMISARTAN 80 MG/1
80 TABLET ORAL DAILY
Qty: 90 TAB | Refills: 2 | Status: SHIPPED | OUTPATIENT
Start: 2019-05-01 | End: 2019-12-02 | Stop reason: SDUPTHER

## 2019-05-01 NOTE — PATIENT INSTRUCTIONS
Diabetes type II. Continue metformin   Continue Ozempic 0.5 mg daily. Continue Jardiance    Continue Lantus 50 units  ** If you have values less than 90, then decrease Lantus in 5 unit increments weekly. Blood pressure:  Blood pressure too low    Change telmisartan/amlodipine to telmisartan 80 mg alone  Take 1/2 tablet of amlodipine/telmisartan until you  telmisartan. Low magnesium:  Continue taking 1600 mg per day. See if you can take 400 mg breakfast, lunch, supper and bedtime OR  Continue 800 mg twice daily.

## 2019-05-01 NOTE — PROGRESS NOTES
History of Present Illness: Nuha Gauthier is a 80 y.o. male presents for follow-up of diabetes. He has had diabetes for 20 + years. He also has CAD. Admission in June/2018 for required stent placement, then in 8/2018 for NSTEMI presumed to be due to vasospasm. .     Having trouble with cough. Breathing is worse. Having chills  Took azithromycin from Dr Reece Farrell. Overall, he reports being sick for about 1 to 2 months. Diabetes related complications:  Neuropathy: + mild sx of neuropathy. No other known complications. A1c 6.1 in 11/2018    Current diabetes regimen:  Lantus 50  Metformin 1 g BID  Ozempic 0.5 mg replaced Bydureon.   - does have some nausea periodically. Reports having emesis on infrequently as well. Jardiance 10 mg daily. Weight is down about 25 lbs in last 9 months. Glucoses: No log or meter to review. 128 last check     BMI 35: Weight is down about 20 pounds in the last year    Lipids - tried several statins, had very bothersome muscle cramps. Unwilling to try again    HTN : amlodipine/telmisartan. Blood pressure lower with weight loss. Microalbuminuria - + off and on. Hypomagnesemia - 800 mg twice daily. Reflux taking Prevacid. Social:  . Also has brother in law who he helps care for. Past Medical History:   Diagnosis Date    Arthritis     Back injury 12    CAD (coronary artery disease)     Stent replacement    [address] ([facility] [address]     GERD (gastroesophageal reflux disease)     Hypertension     Muscle cramps     PUD (peptic ulcer disease)     Reflux     Thromboembolus (HCC)     L BLE     Current Outpatient Medications   Medication Sig    promethazine-codeine (PHENERGAN WITH CODEINE) 6.25-10 mg/5 mL syrup Take 5 mL by mouth nightly for 7 days. Max Daily Amount: 5 mL. May make drowsy    indomethacin (INDOCIN) 50 mg capsule Take 1 Cap by mouth three (3) times daily as needed (gout flare).     COUMADIN 4 mg tablet TAKE 2 TABLETS DAILY    finasteride (PROSCAR) 5 mg tablet TAKE 1 TABLET DAILY FOR SYMPTOMATIC BENIGN PROSTATIC HYPERPLASIA    LANTUS SOLOSTAR U-100 INSULIN 100 unit/mL (3 mL) inpn INJECT 55 UNITS UNDER THE SKIN DAILY (DOSAGE CHANGE) (Patient taking differently: INJECT 50 UNITS UNDER THE SKIN DAILY (DOSAGE CHANGE))    ALTAGRACIA PEN NEEDLE 32 gauge x 5/32\" ndle USE 1 NEEDLE UNDER THE SKIN DAILY    lansoprazole (PREVACID) 30 mg capsule TAKE 1 CAPSULE DAILY BEFORE BREAKFAST    metFORMIN (GLUCOPHAGE) 1,000 mg tablet TAKE 1 TABLET TWICE A DAY    famotidine (PEPCID) 20 mg tablet TAKE 1 TABLET TWICE A DAY FOR STOMACH ACID    Telmisartan-amLODIPine 80-5 mg tab TAKE 1 TABLET NIGHTLY FOR BLOOD PRESSURE    empagliflozin (JARDIANCE) 10 mg tablet Take 1 Tab by mouth daily. For diabetes    nitroglycerin (NITROSTAT) 0.4 mg SL tablet place 1 tablet under the tongue AT FIRST SIGN OF ATTACK, MAY REPE. ..  (REFER TO PRESCRIPTION NOTES).  QVAR REDIHALER 80 mcg/actuation HFAb inhaler inhale 1 puff by mouth twice a day    benzonatate (TESSALON) 200 mg capsule take 1 capsule by mouth twice a day    ALTAGRACIA PEN NEEDLE 32 gauge x 5/32\" ndle     semaglutide (OZEMPIC) 0.25 mg/0.2 mL (2 mg/1.5 mL) sub-q pen 0.5 mg by SubCUTAneous route every seven (7) days. Replaces Bydureon. For diabetes.  aspirin 81 mg chewable tablet Take 1 Tab by mouth daily.  metoprolol succinate (TOPROL-XL) 25 mg XL tablet Take 1 Tab by mouth daily.  magnesium oxide (MAG-OX) 400 mg tablet Take 800 mg by mouth two (2) times a day.  diclofenac (VOLTAREN) 1 % gel Apply  to affected area four (4) times daily.  clopidogrel (PLAVIX) 75 mg tab Take 1 Tab by mouth daily.  cholecalciferol, VITAMIN D3, (VITAMIN D3) 5,000 unit tab tablet Take 1,000 Units by mouth daily. Nature Made     cyanocobalamin (VITAMIN B-12) 1,000 mcg tablet Take 1,000 mcg by mouth daily.  Nature Made    cyclobenzaprine (FLEXERIL) 10 mg tablet Take 10 mg by mouth three (3) times daily as needed for Muscle Spasm(s). No current facility-administered medications for this visit. Allergies   Allergen Reactions    Statins-Hmg-Coa Reductase Inhibitors Myalgia     Tried several statins. Review of Systems:  - Eyes: no blurry vision or double vision  - Cardiovascular: no chest pain  - Respiratory: no shortness of breath  - Musculoskeletal: no myalgias  - Neurological: no numbness/tingling in extremities    Physical Examination:  Visit Vitals  /51 (BP 1 Location: Left arm, BP Patient Position: Sitting)   Pulse 70   Ht 5' 11\" (1.803 m)   Wt 256 lb 9.6 oz (116.4 kg)   BMI 35.79 kg/m²   -   - General: pleasant, using walker. Mild distress with walking. HEENT: hearing intact, EOMI, clear sclera without icterus  - Cardiovascular: regular, normal rate   - Respiratory: Increased effort  - Integumentary: no edema  - Psychiatric: normal mood and affect    Data Reviewed:   Lab Results   Component Value Date/Time    Hemoglobin A1c 6.1 11/21/2018 10:37 AM      Lab Results   Component Value Date/Time    Sodium 133 11/21/2018 10:37 AM    Potassium 4.9 11/21/2018 10:37 AM    Creatinine 1.14 11/21/2018 10:37 AM    Microalb/Creat ratio (ug/mg creat.) 30.6 11/21/2018 10:37 AM        Lab Results   Component Value Date/Time    Cholesterol, total 200 11/21/2018 10:37 AM    HDL Cholesterol 44 11/21/2018 10:37 AM    LDL, calculated 124 11/21/2018 10:37 AM    Triglyceride 159 11/21/2018 10:37 AM      Lab Results   Component Value Date/Time    TSH 2.74 08/17/2018 04:56 AM        Assessment/Plan:   1. Type 2 diabetes with nephropathy (HCC)   Controlled  Continue metformin, Ozempic, Jardiance  Recommending lower Lantus dose for values less than 90. Continue 50 units for now. 2. Essential hypertension   Blood pressure is lower with weight loss. Recommend he take 1/2 tablet of amlodipine/telmisartan for now, then changed to telmisartan 80 mg once daily alone   3. Hypomagnesemia   Continue supplementation.   Hopefully addition of SGLT2 will help   4. Dyslipidemia      5. Coronary artery disease due to lipid rich plaque   Aim for low LDL targets  Jardiance and Ozempic to have cardiovascular indication and Ozempic will likely have a crevasse medication   6. BMI 35.0-35.9,adult      Patient Instructions   Diabetes type II. Continue metformin   Continue Ozempic 0.5 mg daily. Continue Jardiance    Continue Lantus 50 units  ** If you have values less than 90, then decrease Lantus in 5 unit increments weekly. Blood pressure:  Blood pressure too low    Change telmisartan/amlodipine to telmisartan 80 mg alone  Take 1/2 tablet of amlodipine/telmisartan until you  telmisartan. Low magnesium:  Continue taking 1600 mg per day. See if you can take 400 mg breakfast, lunch, supper and bedtime OR  Continue 800 mg twice daily. Follow-up and Dispositions    · Return in about 3 months (around 8/1/2019).            Copy sent to:

## 2019-05-03 ENCOUNTER — TELEPHONE (OUTPATIENT)
Dept: FAMILY MEDICINE CLINIC | Age: 84
End: 2019-05-03

## 2019-05-03 ENCOUNTER — DOCUMENTATION ONLY (OUTPATIENT)
Dept: FAMILY MEDICINE CLINIC | Age: 84
End: 2019-05-03

## 2019-05-03 NOTE — PROGRESS NOTES
Patient advised per Dr Nehal George INR was reported to be elevated from [facility] INR . He was advised to stop Warfarin. Denies active bleeding and was advised if bleeding starts to go to nearest ER. Scheduled appointment for PT/INR check Monday @ 1:40 pm with Dr Nehal George.

## 2019-05-03 NOTE — TELEPHONE ENCOUNTER
Spoke with patient and appointment scheduled for Monday for follow-up for PT/INR and review xray results.

## 2019-05-06 ENCOUNTER — OFFICE VISIT (OUTPATIENT)
Dept: FAMILY MEDICINE CLINIC | Age: 84
End: 2019-05-06

## 2019-05-06 VITALS
WEIGHT: 255.6 LBS | DIASTOLIC BLOOD PRESSURE: 63 MMHG | HEART RATE: 76 BPM | BODY MASS INDEX: 35.78 KG/M2 | SYSTOLIC BLOOD PRESSURE: 112 MMHG | TEMPERATURE: 96.8 F | RESPIRATION RATE: 20 BRPM | OXYGEN SATURATION: 98 % | HEIGHT: 71 IN

## 2019-05-06 DIAGNOSIS — D68.59 HYPERCOAGULABLE STATE (HCC): ICD-10-CM

## 2019-05-06 DIAGNOSIS — K21.9 GASTROESOPHAGEAL REFLUX DISEASE, ESOPHAGITIS PRESENCE NOT SPECIFIED: ICD-10-CM

## 2019-05-06 DIAGNOSIS — Z79.01 ENCOUNTER FOR MONITORING COUMADIN THERAPY: ICD-10-CM

## 2019-05-06 DIAGNOSIS — J84.9 CHRONIC INTERSTITIAL LUNG DISEASE (HCC): ICD-10-CM

## 2019-05-06 DIAGNOSIS — Z51.81 ENCOUNTER FOR MONITORING COUMADIN THERAPY: ICD-10-CM

## 2019-05-06 DIAGNOSIS — R79.1 SUPRATHERAPEUTIC INR: Primary | ICD-10-CM

## 2019-05-06 LAB
INR BLD: 1.4
PT POC: NORMAL SECONDS
VALID INTERNAL CONTROL?: YES

## 2019-05-06 NOTE — PROGRESS NOTES
Subjective: Chief Complaint Patient presents with  Anticoagulation PT/INR He  is a 80y.o. year old male who presents for evaluation of: 
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 He is working with Dr. Nick Garcia on this. Had CT Chest on 11/19/18 that came back with \"Stable fibrotic lung disease, not IPF/UIP pattern. Mild volume loss; no traction bronchiectasis. \"  Largely thought to be related to work exposure over the years. We repeated x-rays recently d/t blood tinged sputum with URI sx acutely superimposed on this but still showed chronic ILD with no pneumonia. There is coughing and blood tinged sputum have improved but he still feels weak and has a painful sore throat Prev had NSTEMI and had another cardiac cath 8/2018 that looked ok including no stenosis of prev stenting. Following with Dr. Miryam Castle. 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 See anti-coag tab for history and dosing with plan. Has done well with home INR checks. ROS: 
Constitutional: per HPI Eyes: negative for visual disturbance Ears, nose, mouth, throat, and face: per HPI Respiratory: per HPI Cardiovascular: negative for chest pain, dyspnea, palpitations Gastrointestinal: negative for nausea, vomiting, diarrhea and abdominal pain Integument/breast: negative for rash Objective:  
 
Vitals:  
 05/06/19 1407 BP: 112/63 Pulse: 76 Resp: 20 Temp: 96.8 °F (36 °C) TempSrc: Oral  
SpO2: 98% Weight: 255 lb 9.6 oz (115.9 kg) Height: 5' 11\" (1.803 m) Physical Examination:  
Gen - NAD Eyes - sclera anicteric ENT - turbinates inflamed bilat, no sinus tenderness, post pharynx erythematous with no exudate Resp - excessive coughing, mild coarse breath sounds diffusely CV - rrr, no m/r/g Allergies Allergen Reactions  Statins-Hmg-Coa Reductase Inhibitors Myalgia Tried several statins. Social History Socioeconomic History  Marital status:  Spouse name: Not on file  Number of children: Not on file  Years of education: Not on file  Highest education level: Not on file Tobacco Use  Smoking status: Former Smoker Packs/day: 1.00 Last attempt to quit: 1975 Years since quittin.3  Smokeless tobacco: Never Used Substance and Sexual Activity  Alcohol use: Yes Comment: once in a while  Drug use: No  
 Sexual activity: Yes  
  Partners: Female Family History Problem Relation Age of Onset  Heart Disease Brother  Bleeding Prob Father  Tuberculosis Sister Past Surgical History:  
Procedure Laterality Date  HX ORTHOPAEDIC Left Crushed/left index finger amputee  HX ORTHOPAEDIC    
 heal spur removed left foot  HX OTHER SURGICAL    
 hand surgery, heal spur L  
 UPPER GI ENDOSCOPY,BIOPSY  2018  UPPER GI ENDOSCOPY,DILATN W GUIDE  2018 Past Medical History:  
Diagnosis Date  Arthritis  Back injury   CAD (coronary artery disease) Stent replacement  Diabetes ([facility] [address]  GERD (gastroesophageal reflux disease)  Hypertension  Muscle cramps  PUD (peptic ulcer disease)  Reflux  Thromboembolus ([facility] [address] L BLE Current Outpatient Medications Medication Sig Dispense Refill  telmisartan (MICARDIS) 80 mg tablet Take 1 Tab by mouth daily. 90 Tab 2  
 indomethacin (INDOCIN) 50 mg capsule Take 1 Cap by mouth three (3) times daily as needed (gout flare).  15 Cap 0  
 finasteride (PROSCAR) 5 mg tablet TAKE 1 TABLET DAILY FOR SYMPTOMATIC BENIGN PROSTATIC HYPERPLASIA 90 Tab 1  
 LANTUS SOLOSTAR U-100 INSULIN 100 unit/mL (3 mL) inpn INJECT 55 UNITS UNDER THE SKIN DAILY (DOSAGE CHANGE) (Patient taking differently: INJECT 50 UNITS UNDER THE SKIN DAILY (DOSAGE CHANGE)) 60 mL 3  
 ALTAGRACIA PEN NEEDLE 32 gauge x /32\" ndle USE 1 NEEDLE UNDER THE SKIN DAILY 100 Pen Needle 3  
  lansoprazole (PREVACID) 30 mg capsule TAKE 1 CAPSULE DAILY BEFORE BREAKFAST 90 Cap 1  
 metFORMIN (GLUCOPHAGE) 1,000 mg tablet TAKE 1 TABLET TWICE A  Tab 1  
 famotidine (PEPCID) 20 mg tablet TAKE 1 TABLET TWICE A DAY FOR STOMACH ACID 180 Tab 3  
 empagliflozin (JARDIANCE) 10 mg tablet Take 1 Tab by mouth daily. For diabetes 90 Tab 3  QVAR REDIHALER 80 mcg/actuation HFAb inhaler inhale 1 puff by mouth twice a day  0  
 benzonatate (TESSALON) 200 mg capsule take 1 capsule by mouth twice a day  0  
 ALTAGRACIA PEN NEEDLE 32 gauge x 5/32\" ndle  semaglutide (OZEMPIC) 0.25 mg/0.2 mL (2 mg/1.5 mL) sub-q pen 0.5 mg by SubCUTAneous route every seven (7) days. Replaces Bydureon. For diabetes. 3 Box 3  
 aspirin 81 mg chewable tablet Take 1 Tab by mouth daily. 90 Tab 1  
 metoprolol succinate (TOPROL-XL) 25 mg XL tablet Take 1 Tab by mouth daily. 90 Tab 3  
 magnesium oxide (MAG-OX) 400 mg tablet Take 800 mg by mouth two (2) times a day.  diclofenac (VOLTAREN) 1 % gel Apply  to affected area four (4) times daily. 100 g 5  clopidogrel (PLAVIX) 75 mg tab Take 1 Tab by mouth daily. 30 Tab 11  
 cholecalciferol, VITAMIN D3, (VITAMIN D3) 5,000 unit tab tablet Take 1,000 Units by mouth daily. Nature Made  cyanocobalamin (VITAMIN B-12) 1,000 mcg tablet Take 1,000 mcg by mouth daily. Nature Made  cyclobenzaprine (FLEXERIL) 10 mg tablet Take 10 mg by mouth three (3) times daily as needed for Muscle Spasm(s).  COUMADIN 4 mg tablet TAKE 2 TABLETS DAILY 180 Tab 0  
 nitroglycerin (NITROSTAT) 0.4 mg SL tablet place 1 tablet under the tongue AT FIRST SIGN OF ATTACK, MAY REPE. ..  (REFER TO PRESCRIPTION NOTES). 1  
  
 
Assessment/ Plan:  
Diagnoses and all orders for this visit: 1. Supratherapeutic INR 2. Hypercoagulable state ([address] 3. Encounter for monitoring Coumadin therapy 
- was supratherapeutic d/t abx but now subtherapeutic so restarting same coumadin dose and f/u in 2 weeks. -     AMB POC PT/INR 
 
4. Chronic interstitial lung disease (HCC) - chronic issue, to see Dr. Alicia Nunez again soon. Was unable to make it for his last appointment because he was feeling sick. Will have PFTs soon as well 5. Gastroesophageal reflux disease, esophagitis presence not specifiedcontinue with PPI and H2 blocker at this point. Goal of weaning off PPI but patient unable to tolerate I spent > 50% of the 25 min visit counseling and educating about: Chronic cough, interstitial lung disease, GERD, and Coumadin management for supratherapeutic INR I have discussed the diagnosis with the patient and the intended plan as seen in the above orders. The patient has received an after-visit summary and questions were answered concerning future plans. I have discussed medication side effects and warnings with the patient as well. The patient verbalizes understanding and agreement with the plan. Follow-up and Dispositions · Return in about 1 month (around 6/3/2019), or if symptoms worsen or fail to improve.

## 2019-05-06 NOTE — PROGRESS NOTES
Chief Complaint Patient presents with  Anticoagulation PT/INR 1. Have you been to the ER, urgent care clinic since your last visit? Hospitalized since your last visit? No 
 
2. Have you seen or consulted any other health care providers outside of the [address] [place] [address] since your last visit? Include any pap smears or colon screening. No  
Continues to have chest congestion and cough

## 2019-05-09 NOTE — ANESTHESIA POSTPROCEDURE EVALUATION
Post-Anesthesia Evaluation and Assessment    Patient: Shantal Renae MRN: [id number]  SSN: [id number]    YOB: 1934  Age: 80 y.o. Sex: male       Cardiovascular Function/Vital Signs  Visit Vitals    /76    Pulse 70    Temp 36.4 °C (97.6 °F)    Resp 17    Ht 5' 11\" (1.803 m)    Wt 125.9 kg (277 lb 8 oz)    SpO2 100%    BMI 38.7 kg/m2       Patient is status post general, total IV anesthesia anesthesia for Procedure(s):  ESOPHAGOGASTRODUODENOSCOPY (EGD) WITH GUIDEWIRE DILATION   ESOPHAGEAL DILATION  ESOPHAGOGASTRODUODENAL (EGD) BIOPSY. Nausea/Vomiting: None    Postoperative hydration reviewed and adequate. Pain:  Pain Scale 1: Visual (05/16/18 1411)  Pain Intensity 1: 0 (05/16/18 1411)   Managed    Neurological Status: At baseline    Mental Status and Level of Consciousness: Arousable    Pulmonary Status:   O2 Device: Room air (05/16/18 1411)   Adequate oxygenation and airway patent    Complications related to anesthesia: None    Post-anesthesia assessment completed.  No concerns    Signed By: Devon De Leon MD     May 16, 2018 normal...

## 2019-05-30 ENCOUNTER — OFFICE VISIT (OUTPATIENT)
Dept: FAMILY MEDICINE CLINIC | Age: 84
End: 2019-05-30

## 2019-05-30 VITALS
BODY MASS INDEX: 36.46 KG/M2 | HEART RATE: 71 BPM | DIASTOLIC BLOOD PRESSURE: 67 MMHG | SYSTOLIC BLOOD PRESSURE: 132 MMHG | HEIGHT: 71 IN | OXYGEN SATURATION: 98 % | TEMPERATURE: 97 F | RESPIRATION RATE: 18 BRPM | WEIGHT: 260.4 LBS

## 2019-05-30 DIAGNOSIS — I25.10 CORONARY ARTERY DISEASE DUE TO LIPID RICH PLAQUE: ICD-10-CM

## 2019-05-30 DIAGNOSIS — Z51.81 ENCOUNTER FOR MONITORING COUMADIN THERAPY: ICD-10-CM

## 2019-05-30 DIAGNOSIS — Z13.31 SCREENING FOR DEPRESSION: ICD-10-CM

## 2019-05-30 DIAGNOSIS — Z86.718 HISTORY OF VENOUS THROMBOEMBOLISM: ICD-10-CM

## 2019-05-30 DIAGNOSIS — J84.9 CHRONIC INTERSTITIAL LUNG DISEASE (HCC): ICD-10-CM

## 2019-05-30 DIAGNOSIS — E78.5 HYPERLIPIDEMIA LDL GOAL <100: ICD-10-CM

## 2019-05-30 DIAGNOSIS — E66.01 OBESITY, MORBID (HCC): ICD-10-CM

## 2019-05-30 DIAGNOSIS — I10 ESSENTIAL HYPERTENSION: ICD-10-CM

## 2019-05-30 DIAGNOSIS — I25.83 CORONARY ARTERY DISEASE DUE TO LIPID RICH PLAQUE: ICD-10-CM

## 2019-05-30 DIAGNOSIS — Z71.89 ADVANCED DIRECTIVES, COUNSELING/DISCUSSION: ICD-10-CM

## 2019-05-30 DIAGNOSIS — Z00.00 MEDICARE ANNUAL WELLNESS VISIT, SUBSEQUENT: Primary | ICD-10-CM

## 2019-05-30 DIAGNOSIS — Z79.4 TYPE 2 DIABETES MELLITUS WITHOUT COMPLICATION, WITH LONG-TERM CURRENT USE OF INSULIN (HCC): ICD-10-CM

## 2019-05-30 DIAGNOSIS — Z95.820 S/P ANGIOPLASTY WITH STENT: ICD-10-CM

## 2019-05-30 DIAGNOSIS — E11.9 TYPE 2 DIABETES MELLITUS WITHOUT COMPLICATION, WITH LONG-TERM CURRENT USE OF INSULIN (HCC): ICD-10-CM

## 2019-05-30 DIAGNOSIS — Z13.39 SCREENING FOR ALCOHOLISM: ICD-10-CM

## 2019-05-30 DIAGNOSIS — Z79.01 ENCOUNTER FOR MONITORING COUMADIN THERAPY: ICD-10-CM

## 2019-05-30 DIAGNOSIS — K21.9 GASTROESOPHAGEAL REFLUX DISEASE, ESOPHAGITIS PRESENCE NOT SPECIFIED: ICD-10-CM

## 2019-05-30 LAB
INR BLD: 2.2
PT POC: NORMAL SECONDS
VALID INTERNAL CONTROL?: YES

## 2019-05-30 RX ORDER — PREDNISONE 10 MG/1
TABLET ORAL
Refills: 0 | COMMUNITY
Start: 2019-05-22 | End: 2019-08-28

## 2019-05-30 RX ORDER — CLEMASTINE FUMARATE 2.68 MG/1
TABLET ORAL
Refills: 0 | Status: ON HOLD | COMMUNITY
Start: 2019-05-22 | End: 2020-01-28

## 2019-05-30 NOTE — PROGRESS NOTES
Subjective:     Chief Complaint   Patient presents with    Annual Wellness Visit     Medicare        He  is a 80y.o. year old male who presents for evaluation of:  Ct to struggle with dyspnea and feels unable to shoot/hunt because of this. He is working with Dr. Jose G Cortés on this. Had CT Chest on 11/19/18 that came back with \"Stable fibrotic lung disease, not IPF/UIP pattern. Mild volume loss; no traction bronchiectasis. \"  Largely thought to be related to work exposure over the years. We repeated x-rays recently d/t blood tinged sputum with URI sx acutely superimposed on this but still showed chronic ILD with no pneumonia. There is coughing and blood tinged sputum have improved but he still feels weak and has a painful sore throat    Prev had NSTEMI and had another cardiac cath 8/2018 that looked ok including no stenosis of prev stenting. Following with Dr. Rajinder Benavidez. 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 See anti-coag tab for history and dosing with plan. Has done well with home INR checks. ROS:  Constitutional: per HPI  Eyes: negative for visual disturbance  Ears, nose, mouth, throat, and face: per HPI  Respiratory: per HPI  Cardiovascular: negative for chest pain, dyspnea, palpitations  Gastrointestinal: negative for nausea, vomiting, diarrhea and abdominal pain  Integument/breast: negative for rash    Objective:     Vitals:    05/30/19 0912   BP: 132/67   Pulse: 71   Resp: 18   Temp: 97 °F (36.1 °C)   TempSrc: Oral   SpO2: 98%   Weight: 260 lb 6.4 oz (118.1 kg)   Height: 5' 11\" (1.803 m)     Physical Examination:   Gen - NAD  Eyes - sclera anicteric  ENT - turbinates inflamed bilat, no sinus tenderness, post pharynx erythematous with no exudate  Resp - excessive coughing, mild coarse breath sounds diffusely  CV - rrr, no m/r/g    Allergies   Allergen Reactions    Statins-Hmg-Coa Reductase Inhibitors Myalgia     Tried several statins.        Social History Socioeconomic History    Marital status:      Spouse name: Not on file    Number of children: Not on file    Years of education: Not on file    Highest education level: Not on file   Tobacco Use    Smoking status: Former Smoker     Packs/day: 1.00     Last attempt to quit: 1975     Years since quittin.4    Smokeless tobacco: Never Used   Substance and Sexual Activity    Alcohol use: Yes     Comment: once in a while    Drug use: No    Sexual activity: Yes     Partners: Female      Family History   Problem Relation Age of Onset    Heart Disease Brother     Bleeding Prob Father     Tuberculosis Sister       Past Surgical History:   Procedure Laterality Date    HX ORTHOPAEDIC Left     Crushed/left index finger amputee    HX ORTHOPAEDIC      heal spur removed left foot    HX OTHER SURGICAL      hand surgery, heal spur L    UPPER GI ENDOSCOPY,BIOPSY  2018         UPPER GI ENDOSCOPY,DILATN W GUIDE  2018           Past Medical History:   Diagnosis Date    Arthritis     Back injury     CAD (coronary artery disease)     Stent replacement    Diabetes ([facility] [address]     GERD (gastroesophageal reflux disease)     Hypertension     Muscle cramps     PUD (peptic ulcer disease)     Reflux     Thromboembolus (HCC)     L BLE      Current Outpatient Medications   Medication Sig Dispense Refill    clemastine 2.68 mg tab tablet take 1 tablet by mouth twice a day  0    predniSONE (DELTASONE) 10 mg tablet take 4 tablets by mouth once daily for 3 days then take 3 tablets. ..  (REFER TO PRESCRIPTION NOTES). 0    telmisartan (MICARDIS) 80 mg tablet Take 1 Tab by mouth daily. 90 Tab 2    indomethacin (INDOCIN) 50 mg capsule Take 1 Cap by mouth three (3) times daily as needed (gout flare).  15 Cap 0    COUMADIN 4 mg tablet TAKE 2 TABLETS DAILY 180 Tab 0    finasteride (PROSCAR) 5 mg tablet TAKE 1 TABLET DAILY FOR SYMPTOMATIC BENIGN PROSTATIC HYPERPLASIA 90 Tab 1    LANTUS SOLOSTAR U-100 INSULIN 100 unit/mL (3 mL) inpn INJECT 55 UNITS UNDER THE SKIN DAILY (DOSAGE CHANGE) (Patient taking differently: INJECT 50 UNITS UNDER THE SKIN DAILY (DOSAGE CHANGE)) 60 mL 3    ALTAGRACIA PEN NEEDLE 32 gauge x 5/32\" ndle USE 1 NEEDLE UNDER THE SKIN DAILY 100 Pen Needle 3    lansoprazole (PREVACID) 30 mg capsule TAKE 1 CAPSULE DAILY BEFORE BREAKFAST 90 Cap 1    metFORMIN (GLUCOPHAGE) 1,000 mg tablet TAKE 1 TABLET TWICE A  Tab 1    famotidine (PEPCID) 20 mg tablet TAKE 1 TABLET TWICE A DAY FOR STOMACH ACID (Patient taking differently: TAKE 1 TABLET Daily  A DAY FOR STOMACH ACID) 180 Tab 3    empagliflozin (JARDIANCE) 10 mg tablet Take 1 Tab by mouth daily. For diabetes 90 Tab 3    QVAR REDIHALER 80 mcg/actuation HFAb inhaler inhale 1 puff by mouth twice a day  0    benzonatate (TESSALON) 200 mg capsule take 1 capsule by mouth twice a day  0    ALTAGRACIA PEN NEEDLE 32 gauge x 5/32\" ndle       aspirin 81 mg chewable tablet Take 1 Tab by mouth daily. 90 Tab 1    metoprolol succinate (TOPROL-XL) 25 mg XL tablet Take 1 Tab by mouth daily. 90 Tab 3    magnesium oxide (MAG-OX) 400 mg tablet Take 800 mg by mouth two (2) times a day.  diclofenac (VOLTAREN) 1 % gel Apply  to affected area four (4) times daily. 100 g 5    clopidogrel (PLAVIX) 75 mg tab Take 1 Tab by mouth daily. 30 Tab 11    cholecalciferol, VITAMIN D3, (VITAMIN D3) 5,000 unit tab tablet Take 1,000 Units by mouth daily. Nature Made       cyanocobalamin (VITAMIN B-12) 1,000 mcg tablet Take 1,000 mcg by mouth daily. Nature Made      cyclobenzaprine (FLEXERIL) 10 mg tablet Take 10 mg by mouth three (3) times daily as needed for Muscle Spasm(s).  nitroglycerin (NITROSTAT) 0.4 mg SL tablet place 1 tablet under the tongue AT FIRST SIGN OF ATTACK, MAY REPE. ..  (REFER TO PRESCRIPTION NOTES). 1    semaglutide (OZEMPIC) 0.25 mg/0.2 mL (2 mg/1.5 mL) sub-q pen 0.5 mg by SubCUTAneous route every seven (7) days. Replaces Bydureon. For diabetes.  3 Box 3        Assessment/ Plan:   Diagnoses and all orders for this visit:    1. Medicare annual wellness visit, subsequent  2. Advanced directives, counseling/discussion  3. Screening for alcoholism  -     [place] ANNUAL ALCOHOL SCREEN 15 MIN  4. Screening for depression  -     DEPRESSION SCREEN ANNUAL    5. Chronic interstitial lung disease (HCC)working with pulmonology, currently on prednisone taper with significant improvement in symptoms    6. Gastroesophageal reflux disease, esophagitis presence not specifiedcontinue with PPI and H2 blocker at this point. Goal of weaning off PPI but patient unable to tolerate    7. Essential hypertensioncontrolled    9. History of venous thromboembolismINR therapeutic at 2.2, continue home checks  -     AMB POC PT/INR    8. Coronary artery disease due to lipid rich plaque  10. S/P angioplasty with stent  11. Hyperlipidemia LDL goal <100  Stable, continue current meds. Statin intolerant    12. Obesity, morbid ([facility])working on weight loss  Discussed the patient's BMI. The BMI follow up plan is as follows:   dietary management education, guidance, and counseling  encourage exercise  monitor weight  prescribed dietary intake    13. Encounter for monitoring Coumadin therapy  -     AMB POC PT/INR    14. Type 2 diabetes mellitus without complication, with long-term current use of insulin ([facility])controlled    I have discussed the diagnosis with the patient and the intended plan as seen in the above orders. The patient has received an after-visit summary and questions were answered concerning future plans. I have discussed medication side effects and warnings with the patient as well. The patient verbalizes understanding and agreement with the plan. Follow-up and Dispositions    · Return in about 6 months (around 11/30/2019).        This is the Subsequent Medicare Annual Wellness Exam, performed 12 months or more after the Initial AWV or the last Subsequent AWV    I have reviewed the patient's medical history in detail and updated the computerized patient record. History     Past Medical History:   Diagnosis Date    Arthritis     Back injury 12    CAD (coronary artery disease)     Stent replacement    Diabetes ([address]     GERD (gastroesophageal reflux disease)     Hypertension     Muscle cramps     PUD (peptic ulcer disease)     Reflux     Thromboembolus (HCC)     L BLE      Past Surgical History:   Procedure Laterality Date    HX ORTHOPAEDIC Left     Crushed/left index finger amputee    HX ORTHOPAEDIC      heal spur removed left foot    HX OTHER SURGICAL      hand surgery, heal spur L    UPPER GI ENDOSCOPY,BIOPSY  5/16/2018         UPPER GI ENDOSCOPY,DILATN W GUIDE  5/16/2018          Current Outpatient Medications   Medication Sig Dispense Refill    clemastine 2.68 mg tab tablet take 1 tablet by mouth twice a day  0    predniSONE (DELTASONE) 10 mg tablet take 4 tablets by mouth once daily for 3 days then take 3 tablets. ..  (REFER TO PRESCRIPTION NOTES). 0    telmisartan (MICARDIS) 80 mg tablet Take 1 Tab by mouth daily. 90 Tab 2    indomethacin (INDOCIN) 50 mg capsule Take 1 Cap by mouth three (3) times daily as needed (gout flare).  15 Cap 0    COUMADIN 4 mg tablet TAKE 2 TABLETS DAILY 180 Tab 0    finasteride (PROSCAR) 5 mg tablet TAKE 1 TABLET DAILY FOR SYMPTOMATIC BENIGN PROSTATIC HYPERPLASIA 90 Tab 1    LANTUS SOLOSTAR U-100 INSULIN 100 unit/mL (3 mL) inpn INJECT 55 UNITS UNDER THE SKIN DAILY (DOSAGE CHANGE) (Patient taking differently: INJECT 50 UNITS UNDER THE SKIN DAILY (DOSAGE CHANGE)) 60 mL 3    ALTAGRACIA PEN NEEDLE 32 gauge x 5/32\" ndle USE 1 NEEDLE UNDER THE SKIN DAILY 100 Pen Needle 3    lansoprazole (PREVACID) 30 mg capsule TAKE 1 CAPSULE DAILY BEFORE BREAKFAST 90 Cap 1    metFORMIN (GLUCOPHAGE) 1,000 mg tablet TAKE 1 TABLET TWICE A  Tab 1    famotidine (PEPCID) 20 mg tablet TAKE 1 TABLET TWICE A DAY FOR STOMACH ACID (Patient taking differently: TAKE 1 TABLET Daily  A DAY FOR STOMACH ACID) 180 Tab 3    empagliflozin (JARDIANCE) 10 mg tablet Take 1 Tab by mouth daily. For diabetes 90 Tab 3    QVAR REDIHALER 80 mcg/actuation HFAb inhaler inhale 1 puff by mouth twice a day  0    benzonatate (TESSALON) 200 mg capsule take 1 capsule by mouth twice a day  0    ALTAGRACIA PEN NEEDLE 32 gauge x \" ndle       aspirin 81 mg chewable tablet Take 1 Tab by mouth daily. 90 Tab 1    metoprolol succinate (TOPROL-XL) 25 mg XL tablet Take 1 Tab by mouth daily. 90 Tab 3    magnesium oxide (MAG-OX) 400 mg tablet Take 800 mg by mouth two (2) times a day.  diclofenac (VOLTAREN) 1 % gel Apply  to affected area four (4) times daily. 100 g 5    clopidogrel (PLAVIX) 75 mg tab Take 1 Tab by mouth daily. 30 Tab 11    cholecalciferol, VITAMIN D3, (VITAMIN D3) 5,000 unit tab tablet Take 1,000 Units by mouth daily. Nature Made       cyanocobalamin (VITAMIN B-12) 1,000 mcg tablet Take 1,000 mcg by mouth daily. Nature Made      cyclobenzaprine (FLEXERIL) 10 mg tablet Take 10 mg by mouth three (3) times daily as needed for Muscle Spasm(s).  nitroglycerin (NITROSTAT) 0.4 mg SL tablet place 1 tablet under the tongue AT FIRST SIGN OF ATTACK, MAY REPE. ..  (REFER TO PRESCRIPTION NOTES). 1    semaglutide (OZEMPIC) 0.25 mg/0.2 mL (2 mg/1.5 mL) sub-q pen 0.5 mg by SubCUTAneous route every seven (7) days. Replaces Bydureon. For diabetes. 3 Box 3     Allergies   Allergen Reactions    Statins-Hmg-Coa Reductase Inhibitors Myalgia     Tried several statins.       Family History   Problem Relation Age of Onset    Heart Disease Brother     Bleeding Prob Father     Tuberculosis Sister      Social History     Tobacco Use    Smoking status: Former Smoker     Packs/day: 1.00     Last attempt to quit: 1975     Years since quittin.4    Smokeless tobacco: Never Used   Substance Use Topics    Alcohol use: Yes     Comment: once in a while     Patient Active Problem List   Diagnosis Code    Diabetes mellitus, type II ([facility] E11.9    Gastroesophageal reflux disease K21.9    History of venous thromboembolism Z86.718    Encounter for monitoring Coumadin therapy Z51.81, Z79.01    Essential hypertension I10    Obesity, morbid ([facility] E66.01    Type 2 diabetes with nephropathy ([facility] E11.21    NSTEMI (non-ST elevated myocardial infarction) ([facility] I21.4       Depression Risk Factor Screening:     3 most recent PHQ Screens 5/30/2019   Little interest or pleasure in doing things Not at all   Feeling down, depressed, irritable, or hopeless Not at all   Total Score PHQ 2 0     Alcohol Risk Factor Screening: You do not drink alcohol or very rarely. Functional Ability and Level of Safety:   Hearing Loss  Hearing is good. Activities of Daily Living  The home contains: no safety equipment. Patient does total self care    Fall Risk  Fall Risk Assessment, last 12 mths 5/30/2019   Able to walk? Yes   Fall in past 12 months? No   Fall with injury? -   Number of falls in past 12 months -   Fall Risk Score -       Abuse Screen  Patient is not abused    Cognitive Screening   Evaluation of Cognitive Function:  Has your family/caregiver stated any concerns about your memory: no  Normal, Verbal Fluency Test    Patient Care Team   Patient Care Team:  Rajeev Portillo MD as PCP - General (Family Practice)  Eric Milner MD (Ophthalmology)  Chantell Velez MD as Consulting Provider (Endocrinology)  Adrienne Hall MD (Orthopedic Surgery)  Tata Spence MD (Otolaryngology)  Rajeev Parish RN as Ambulatory Care Navigator [facility])    Assessment/Plan   Education and counseling provided:  Are appropriate based on today's review and evaluation    Diagnoses and all orders for this visit:    1. Medicare annual wellness visit, subsequent    2. Advanced directives, counseling/discussion    3.  Screening for alcoholism  -     [place] ANNUAL ALCOHOL SCREEN 15 MIN    4. Screening for depression  -     DEPRESSION SCREEN ANNUAL    5. Chronic interstitial lung disease ([facility] [address]    6. Gastroesophageal reflux disease, esophagitis presence not specified    7. Essential hypertension    8. Coronary artery disease due to lipid rich plaque    9. History of venous thromboembolism  -     AMB POC PT/INR    10. S/P angioplasty with stent    11. Hyperlipidemia LDL goal <100    12. Obesity, morbid ([facility] [address]    13. Encounter for monitoring Coumadin therapy  -     AMB POC PT/INR    14.  Type 2 diabetes mellitus without complication, with long-term current use of insulin [facility])        Health Maintenance Due   Topic Date Due    Shingrix Vaccine Age 49> (1 of 2) 08/15/1984    MEDICARE YEARLY EXAM  04/19/2019

## 2019-05-30 NOTE — PATIENT INSTRUCTIONS
Medicare Wellness Visit, Male The best way to live healthy is to have a lifestyle where you eat a well-balanced diet, exercise regularly, limit alcohol use, and quit all forms of tobacco/nicotine, if applicable. Regular preventive services are another way to keep healthy. Preventive services (vaccines, screening tests, monitoring & exams) can help personalize your care plan, which helps you manage your own care. Screening tests can find health problems at the earliest stages, when they are easiest to treat. [facility] follows the current, evidence-based guidelines published by the [facility] ([facility]) when recommending preventive services for our patients. Because we follow these guidelines, sometimes recommendations change over time as research supports it. (For example, a prostate screening blood test is no longer routinely recommended for men with no symptoms.) Of course, you and your doctor may decide to screen more often for some diseases, based on your risk and co-morbidities (chronic disease you are already diagnosed with). Preventive services for you include: - Medicare offers their members a free annual wellness visit, which is time for you and your primary care provider to discuss and plan for your preventive service needs. Take advantage of this benefit every year! 
-All adults over age 72 should receive the recommended pneumonia vaccines. Current USPSTF guidelines recommend a series of two vaccines for the best pneumonia protection.  
-All adults should have a flu vaccine yearly and an ECG.  All adults age 61 and older should receive a shingles vaccine once in their lifetime.   
-All adults age 38-68 who are overweight should have a diabetes screening test once every three years.  
-Other screening tests & preventive services for persons with diabetes include: an eye exam to screen for diabetic retinopathy, a kidney function test, a foot exam, and stricter control over your cholesterol.  
-Cardiovascular screening for adults with routine risk involves an electrocardiogram (ECG) at intervals determined by the provider.  
-Colorectal cancer screening should be done for adults age 54-65 with no increased risk factors for colorectal cancer. There are a number of acceptable methods of screening for this type of cancer. Each test has its own benefits and drawbacks. Discuss with your provider what is most appropriate for you during your annual wellness visit. The different tests include: colonoscopy (considered the best screening method), a fecal occult blood test, a fecal DNA test, and sigmoidoscopy. 
-All adults born between [facility] should be screened once for Hepatitis C. 
-An Abdominal Aortic Aneurysm (AAA) Screening is recommended for men age 73-68 who has ever smoked in their lifetime. Here is a list of your current Health Maintenance items (your personalized list of preventive services) with a due date: 
Health Maintenance Due Topic Date Due  Shingles Vaccine (1 of 2) 08/15/1984  Pneumococcal Vaccine (1 of 2 - PCV13) 08/15/1999 [facility] Annual Well Visit  04/19/2019

## 2019-05-30 NOTE — PROGRESS NOTES
Chief Complaint   Patient presents with   Alondra Vaughan Annual Wellness Visit     Medicare   1. Have you been to the ER, urgent care clinic since your last visit? Hospitalized since your last visit? No    2. Have you seen or consulted any other health care providers outside of the [address] [place] [address] since your last visit? Include any pap smears or colon screening.  Yes Where: Pulmonary physician   Discuss what will happen when finishes Prednisone

## 2019-05-30 NOTE — ACP (ADVANCE CARE PLANNING)
Advance Care Planning    Advance Care Planning (ACP) Provider Conversation Snapshot    Date of ACP Conversation: 05/30/19  Persons included in Conversation:  patient  Length of ACP Conversation in minutes:  <16 minutes (Non-Billable)    Authorized Decision Maker (if patient is incapable of making informed decisions):    This person is:   Healthcare Agent/Medical Power of  under Advance Directive          For Patients with Decision Making Capacity:   Values/Goals: Exploration of values, goals, and preferences if recovery is not expected, even with continued medical treatment in the event of:  Imminent death    Conversation Outcomes / Follow-Up Plan:   Reviewed current ACP on file and no changes are desired

## 2019-06-07 ENCOUNTER — TELEPHONE (OUTPATIENT)
Dept: FAMILY MEDICINE CLINIC | Age: 84
End: 2019-06-07

## 2019-06-07 LAB — INR, EXTERNAL: 3 (ref 2–3)

## 2019-06-10 RX ORDER — WARFARIN SODIUM 4 MG/1
TABLET ORAL
Qty: 180 TAB | Refills: 0 | Status: SHIPPED | OUTPATIENT
Start: 2019-06-10 | End: 2019-09-01 | Stop reason: SDUPTHER

## 2019-07-23 DIAGNOSIS — K21.9 GASTROESOPHAGEAL REFLUX DISEASE, ESOPHAGITIS PRESENCE NOT SPECIFIED: ICD-10-CM

## 2019-07-23 RX ORDER — LANSOPRAZOLE 30 MG/1
CAPSULE, DELAYED RELEASE ORAL
Qty: 14 CAP | Refills: 0 | Status: SHIPPED | OUTPATIENT
Start: 2019-07-23 | End: 2019-08-20 | Stop reason: SDUPTHER

## 2019-07-23 NOTE — TELEPHONE ENCOUNTER
----- Message from Sangita Cisneros sent at 7/23/2019  8:07 AM EDT -----  Regarding: Dr. Edson David (if not patient): Samantha Jade       Relationship of caller (if not patient): Wife      Best contact number(s): [phone]       Name of medication and dosage if known: (( Lansoprazole Dr Malu Patrick 30 mg))      Is patient out of this medication (yes/no):       Pharmacy name: [facility] [address] listed in chart? (yes/no):  Pharmacy phone number: [phone]      Details to clarify the request: Wife stated that prescription with  [facility] has no refills and ES advised wife to have doctor to prescribed a 14 day  prescriptionton to [address] [place] [address] so pt would not run out .        Sangita Cisneros

## 2019-08-05 ENCOUNTER — TELEPHONE (OUTPATIENT)
Dept: FAMILY MEDICINE CLINIC | Age: 84
End: 2019-08-05

## 2019-08-05 NOTE — TELEPHONE ENCOUNTER
----- Message from Griselda Yan sent at 8/2/2019  5:59 PM EDT -----  Regarding: Dr. April España   Pt's wife is requesting for the nurse to give her a call in regards to issues with a Rx. Best contact number is [phone].

## 2019-08-19 DIAGNOSIS — K21.9 GASTROESOPHAGEAL REFLUX DISEASE, ESOPHAGITIS PRESENCE NOT SPECIFIED: ICD-10-CM

## 2019-08-19 NOTE — TELEPHONE ENCOUNTER
Pt wife Carine Lagos is calling for a refill for:   lansoprazole (PREVACID) 30 mg capsule     States he is out of it and she was told refills have been denied and it was only written for 14 pills       Best number to reach her is [phone]

## 2019-08-20 RX ORDER — LANSOPRAZOLE 30 MG/1
CAPSULE, DELAYED RELEASE ORAL
Qty: 14 CAP | Refills: 0 | Status: SHIPPED | OUTPATIENT
Start: 2019-08-20 | End: 2019-08-28 | Stop reason: SDUPTHER

## 2019-08-28 ENCOUNTER — OFFICE VISIT (OUTPATIENT)
Dept: ENDOCRINOLOGY | Age: 84
End: 2019-08-28

## 2019-08-28 VITALS
HEIGHT: 71 IN | WEIGHT: 261.4 LBS | SYSTOLIC BLOOD PRESSURE: 136 MMHG | HEART RATE: 72 BPM | OXYGEN SATURATION: 98 % | DIASTOLIC BLOOD PRESSURE: 69 MMHG | BODY MASS INDEX: 36.6 KG/M2 | RESPIRATION RATE: 16 BRPM

## 2019-08-28 DIAGNOSIS — E83.42 HYPOMAGNESEMIA: ICD-10-CM

## 2019-08-28 DIAGNOSIS — E11.21 TYPE 2 DIABETES WITH NEPHROPATHY (HCC): Primary | ICD-10-CM

## 2019-08-28 DIAGNOSIS — I10 ESSENTIAL HYPERTENSION: ICD-10-CM

## 2019-08-28 DIAGNOSIS — I25.10 CORONARY ARTERY DISEASE DUE TO LIPID RICH PLAQUE: ICD-10-CM

## 2019-08-28 DIAGNOSIS — K21.9 GASTROESOPHAGEAL REFLUX DISEASE, ESOPHAGITIS PRESENCE NOT SPECIFIED: ICD-10-CM

## 2019-08-28 DIAGNOSIS — I25.83 CORONARY ARTERY DISEASE DUE TO LIPID RICH PLAQUE: ICD-10-CM

## 2019-08-28 LAB — HBA1C MFR BLD HPLC: 5.8 %

## 2019-08-28 RX ORDER — LANSOPRAZOLE 30 MG/1
CAPSULE, DELAYED RELEASE ORAL
Qty: 90 CAP | Refills: 1 | Status: SHIPPED | OUTPATIENT
Start: 2019-08-28 | End: 2019-12-02 | Stop reason: SDUPTHER

## 2019-08-28 NOTE — PATIENT INSTRUCTIONS
Diabetes type II. Continue metformin   Continue Ozempic 0.5 mg daily. Continue Jardiance    Lower dose to 45 units. ** If you have values less than 100, then decrease Lantus in 5 unit increments weekly. Blood pressure:  Good  Continue telmisartan. Low magnesium:  Continue taking 1600 mg per day. See if you can take 400 mg breakfast, lunch, supper and bedtime OR  Continue 800 mg twice daily.

## 2019-08-28 NOTE — PROGRESS NOTES
Lab Results   Component Value Date/Time    Hemoglobin A1c 6.1 (H) 11/21/2018 10:37 AM    Hemoglobin A1c (POC) 5.8 08/28/2019 10:19 AM       Diabetic Foot and Eye Exam HM Status   Topic Date Due    Diabetic Foot Care  08/15/2019    Eye Exam  11/30/2019     Lab Results   Component Value Date/Time    Microalb/Creat ratio (ug/mg creat.) 30.6 (H) 11/21/2018 10:37 AM     Lab Results   Component Value Date/Time    Cholesterol, total 200 (H) 11/21/2018 10:37 AM    HDL Cholesterol 44 11/21/2018 10:37 AM    LDL, calculated 124 (H) 11/21/2018 10:37 AM    VLDL, calculated 32 11/21/2018 10:37 AM    Triglyceride 159 (H) 11/21/2018 10:37 AM    CHOL/HDL Ratio 5.5 (H) 08/17/2018 04:56 AM

## 2019-08-28 NOTE — PROGRESS NOTES
History of Present Illness: Harsh Rodriguez is a 80 y.o. male presents for follow-up of diabetes. He has had diabetes for 20 + years. He also has CAD. Admission in June/2018 for required stent placement, then in 8/2018 for NSTEMI presumed to be due to vasospasm. .     Diabetes related complications:  Neuropathy: + mild sx of neuropathy. No other known complications. A1c 5.8 with pre-labs. Current diabetes regimen:  Lantus 50  Metformin 1 g BID  Ozempic 0.5 mg - weekly. Jardiance 10 mg daily. Glucoses:   115-141 recently fasting. Lowest 87. He has no sx of hypoglycemia    BMI 36: Weight is down about 20 pounds in the last year. Stable since last visit. Lipids - tried several statins, had very bothersome muscle cramps. Unwilling to try again    HTN : Take only telmisartan now. Stopped amlodipine after last visit. Microalbuminuria - + off and on. Hypomagnesemia - 800 mg twice daily. Has had trouble in past taking this more frequently. Has some mild, occasional cramps in feet     His main concern today is reflux and difficulty in getting Prevacid. He has tried three times to stop this. Reflux is inadequately controlled with Pepcid alone. Social:  . Also has brother in law who he helps care for. Past Medical History:   Diagnosis Date    Arthritis     Back injury 1994    CAD (coronary artery disease)     Stent replacement    [facility] ([facility] [address]     GERD (gastroesophageal reflux disease)     Hypertension     Muscle cramps     PUD (peptic ulcer disease)     Reflux     Thromboembolus (HCC)     L BLE     Current Outpatient Medications   Medication Sig    OZEMPIC 0.25 mg or 0.5 mg(2 mg/1.5 mL) sub-q pen INJECT 0.5 MG UNDER THE SKIN EVERY 7 DAYS (REPLACES BYDUREON.  FOR DIABETES)    SYMBICORT 160-4.5 mcg/actuation HFAA     finasteride (PROSCAR) 5 mg tablet TAKE 1 TABLET DAILY FOR SYMPTOMATIC BENIGN PROSTATIC HYPERPLASIA    metFORMIN (GLUCOPHAGE) 1,000 mg tablet TAKE 1 TABLET TWICE A DAY    COUMADIN 4 mg tablet TAKE 2 TABLETS DAILY    telmisartan (MICARDIS) 80 mg tablet Take 1 Tab by mouth daily.  LANTUS SOLOSTAR U-100 INSULIN 100 unit/mL (3 mL) inpn INJECT 55 UNITS UNDER THE SKIN DAILY (DOSAGE CHANGE) (Patient taking differently: INJECT 50 UNITS UNDER THE SKIN DAILY (DOSAGE CHANGE))    ALTAGRACIA PEN NEEDLE 32 gauge x 5/32\" ndle USE 1 NEEDLE UNDER THE SKIN DAILY    famotidine (PEPCID) 20 mg tablet TAKE 1 TABLET TWICE A DAY FOR STOMACH ACID (Patient taking differently: TAKE 1 TABLET Daily  A DAY FOR STOMACH ACID)    empagliflozin (JARDIANCE) 10 mg tablet Take 1 Tab by mouth daily. For diabetes    nitroglycerin (NITROSTAT) 0.4 mg SL tablet place 1 tablet under the tongue AT FIRST SIGN OF ATTACK, MAY REPE. ..  (REFER TO PRESCRIPTION NOTES).  ALTAGRACIA PEN NEEDLE 32 gauge x 5/32\" ndle     aspirin 81 mg chewable tablet Take 1 Tab by mouth daily.  metoprolol succinate (TOPROL-XL) 25 mg XL tablet Take 1 Tab by mouth daily.  magnesium oxide (MAG-OX) 400 mg tablet Take 800 mg by mouth two (2) times a day.  diclofenac (VOLTAREN) 1 % gel Apply  to affected area four (4) times daily.  cholecalciferol, VITAMIN D3, (VITAMIN D3) 5,000 unit tab tablet Take 1,000 Units by mouth daily. Nature Made     cyanocobalamin (VITAMIN B-12) 1,000 mcg tablet Take 1,000 mcg by mouth daily. Nature Made    cyclobenzaprine (FLEXERIL) 10 mg tablet Take 10 mg by mouth three (3) times daily as needed for Muscle Spasm(s).  lansoprazole (PREVACID) 30 mg capsule TAKE 1 CAPSULE DAILY BEFORE BREAKFAST    clemastine 2.68 mg tab tablet take 1 tablet by mouth twice a day    predniSONE (DELTASONE) 10 mg tablet take 4 tablets by mouth once daily for 3 days then take 3 tablets. ..  (REFER TO PRESCRIPTION NOTES).  indomethacin (INDOCIN) 50 mg capsule Take 1 Cap by mouth three (3) times daily as needed (gout flare).     QVAR REDIHALER 80 mcg/actuation HFAb inhaler inhale 1 puff by mouth twice a day    benzonatate (TESSALON) 200 mg capsule take 1 capsule by mouth twice a day    clopidogrel (PLAVIX) 75 mg tab Take 1 Tab by mouth daily. No current facility-administered medications for this visit. Allergies   Allergen Reactions    Statins-Hmg-Coa Reductase Inhibitors Myalgia     Tried several statins. dsReview of Systems:  - Eyes: no blurry vision or double vision  - Cardiovascular: no chest pain  - Respiratory: no shortness of breath  - Musculoskeletal: see HPI   - Neurological: no numbness/tingling in extremities    Physical Examination:  Visit Vitals  Ht 5' 11\" (1.803 m)   Wt 261 lb 6.4 oz (118.6 kg)   BMI 36.46 kg/m²     - General: pleasant, no distress, normal gait   HEENT: hearing intact, EOMI, clear sclera without icterus  - Cardiovascular: regular, normal rate   - Respiratory: normal effort  - Integumentary: no edema   Diabetic foot exam:         Right Foot:   Visual Exam: normal  + bunion. No calluses. Pulse DP: 2+ (normal)   Filament test: normal sensation      - Psychiatric: normal mood and affect    Data Reviewed:   Lab Results   Component Value Date/Time    Hemoglobin A1c 6.1 11/21/2018 10:37 AM      Lab Results   Component Value Date/Time    Sodium 133 11/21/2018 10:37 AM    Potassium 4.9 11/21/2018 10:37 AM    Creatinine 1.14 11/21/2018 10:37 AM    Microalb/Creat ratio (ug/mg creat.) 30.6 11/21/2018 10:37 AM        Lab Results   Component Value Date/Time    Cholesterol, total 200 11/21/2018 10:37 AM    HDL Cholesterol 44 11/21/2018 10:37 AM    LDL, calculated 124 11/21/2018 10:37 AM    Triglyceride 159 11/21/2018 10:37 AM      Lab Results   Component Value Date/Time    TSH 2.74 08/17/2018 04:56 AM        Assessment/Plan:   1. Type 2 diabetes with microalbuminuria (HCC)   - no lows, but A1c lower than desired  - lowered Lantus to 45 units and lower further for values < 100  - continue Jardiance, metformin, Ozempic   2.  Gastroesophageal reflux disease, esophagitis presence not specified   - resume Prevacid. He appears to simply need this. 3. Hypomagnesemia   - reassess with 800 mg twice daily. May increase to TID with meals   4. Essential hypertension   - continue telmisartan   5. Coronary artery disease due to lipid rich plaque    6. BMI 36.0-36.9,adult   - lowered Lantus dose     Patient Instructions   Diabetes type II. Continue metformin   Continue Ozempic 0.5 mg daily. Continue Jardiance    Lower dose to 45 units. ** If you have values less than 100, then decrease Lantus in 5 unit increments weekly. Blood pressure:  Good  Continue telmisartan. Low magnesium:  Continue taking 1600 mg per day. See if you can take 400 mg breakfast, lunch, supper and bedtime OR  Continue 800 mg twice daily. Follow-up and Dispositions    · Return in about 6 months (around 2/28/2020).

## 2019-08-29 LAB
ALBUMIN SERPL-MCNC: 4.2 G/DL (ref 3.5–4.7)
ALBUMIN/CREAT UR: <5.5 MG/G CREAT (ref 0–30)
ALBUMIN/GLOB SERPL: 1.4 {RATIO} (ref 1.2–2.2)
ALP SERPL-CCNC: 66 IU/L (ref 39–117)
ALT SERPL-CCNC: 15 IU/L (ref 0–44)
AST SERPL-CCNC: 38 IU/L (ref 0–40)
BILIRUB SERPL-MCNC: 0.2 MG/DL (ref 0–1.2)
BUN SERPL-MCNC: 18 MG/DL (ref 8–27)
BUN/CREAT SERPL: 16 (ref 10–24)
CALCIUM SERPL-MCNC: 9.8 MG/DL (ref 8.6–10.2)
CHLORIDE SERPL-SCNC: 100 MMOL/L (ref 96–106)
CHOLEST SERPL-MCNC: 202 MG/DL (ref 100–199)
CO2 SERPL-SCNC: 20 MMOL/L (ref 20–29)
CREAT SERPL-MCNC: 1.13 MG/DL (ref 0.76–1.27)
CREAT UR-MCNC: 54.6 MG/DL
GLOBULIN SER CALC-MCNC: 3.1 G/DL (ref 1.5–4.5)
GLUCOSE SERPL-MCNC: 112 MG/DL (ref 65–99)
HDLC SERPL-MCNC: 46 MG/DL
INTERPRETATION, 910389: NORMAL
INTERPRETATION: NORMAL
LDLC SERPL CALC-MCNC: 125 MG/DL (ref 0–99)
Lab: NORMAL
MAGNESIUM SERPL-MCNC: 1.8 MG/DL (ref 1.6–2.3)
MICROALBUMIN UR-MCNC: <3 UG/ML
PDF IMAGE, 910387: NORMAL
POTASSIUM SERPL-SCNC: 4.9 MMOL/L (ref 3.5–5.2)
PROT SERPL-MCNC: 7.3 G/DL (ref 6–8.5)
SODIUM SERPL-SCNC: 136 MMOL/L (ref 134–144)
TRIGL SERPL-MCNC: 154 MG/DL (ref 0–149)
TSH SERPL DL<=0.005 MIU/L-ACNC: 3.43 UIU/ML (ref 0.45–4.5)
VLDLC SERPL CALC-MCNC: 31 MG/DL (ref 5–40)

## 2019-08-29 NOTE — PROGRESS NOTES
Labs overall stable to improved  Pleased that magnesium is now normal. He can continue 800 mg twice daily for magnesium. Elsa  Please call and mail lab letter. Thank you.

## 2019-08-30 ENCOUNTER — TELEPHONE (OUTPATIENT)
Dept: ENDOCRINOLOGY | Age: 84
End: 2019-08-30

## 2019-08-30 NOTE — TELEPHONE ENCOUNTER
Per Dr Zayra Winn, was made aware of his lab results   after his name and  was verified. Pt was also made aware that his result letter with details of his result was mailed today as well.

## 2019-08-30 NOTE — TELEPHONE ENCOUNTER
----- Message from Mario Dhlaiwal MD sent at 8/29/2019  5:31 PM EDT -----  Labs overall stable to improved  Pleased that magnesium is now normal. He can continue 800 mg twice daily for magnesium. Authia  Please call and mail lab letter. Thank you.

## 2019-09-03 RX ORDER — WARFARIN SODIUM 4 MG/1
TABLET ORAL
Qty: 180 TAB | Refills: 4 | Status: SHIPPED | OUTPATIENT
Start: 2019-09-03 | End: 2019-12-02 | Stop reason: SDUPTHER

## 2019-12-02 ENCOUNTER — OFFICE VISIT (OUTPATIENT)
Dept: FAMILY MEDICINE CLINIC | Age: 84
End: 2019-12-02

## 2019-12-02 VITALS
RESPIRATION RATE: 16 BRPM | OXYGEN SATURATION: 97 % | TEMPERATURE: 97.8 F | HEIGHT: 71 IN | HEART RATE: 73 BPM | SYSTOLIC BLOOD PRESSURE: 135 MMHG | WEIGHT: 265 LBS | DIASTOLIC BLOOD PRESSURE: 69 MMHG | BODY MASS INDEX: 37.1 KG/M2

## 2019-12-02 DIAGNOSIS — I25.83 CORONARY ARTERY DISEASE DUE TO LIPID RICH PLAQUE: ICD-10-CM

## 2019-12-02 DIAGNOSIS — N40.0 BENIGN PROSTATIC HYPERPLASIA, UNSPECIFIED WHETHER LOWER URINARY TRACT SYMPTOMS PRESENT: ICD-10-CM

## 2019-12-02 DIAGNOSIS — I10 ESSENTIAL HYPERTENSION: ICD-10-CM

## 2019-12-02 DIAGNOSIS — D68.59 HYPERCOAGULABLE STATE (HCC): ICD-10-CM

## 2019-12-02 DIAGNOSIS — E11.9 TYPE 2 DIABETES MELLITUS WITHOUT COMPLICATION, WITH LONG-TERM CURRENT USE OF INSULIN (HCC): Primary | ICD-10-CM

## 2019-12-02 DIAGNOSIS — I21.4 NSTEMI (NON-ST ELEVATED MYOCARDIAL INFARCTION) (HCC): ICD-10-CM

## 2019-12-02 DIAGNOSIS — I25.10 CORONARY ARTERY DISEASE DUE TO LIPID RICH PLAQUE: ICD-10-CM

## 2019-12-02 DIAGNOSIS — K21.9 GASTROESOPHAGEAL REFLUX DISEASE, ESOPHAGITIS PRESENCE NOT SPECIFIED: ICD-10-CM

## 2019-12-02 DIAGNOSIS — Z79.01 ENCOUNTER FOR MONITORING COUMADIN THERAPY: ICD-10-CM

## 2019-12-02 DIAGNOSIS — J84.9 CHRONIC INTERSTITIAL LUNG DISEASE (HCC): ICD-10-CM

## 2019-12-02 DIAGNOSIS — Z95.820 S/P ANGIOPLASTY WITH STENT: ICD-10-CM

## 2019-12-02 DIAGNOSIS — E78.5 HYPERLIPIDEMIA LDL GOAL <100: ICD-10-CM

## 2019-12-02 DIAGNOSIS — Z51.81 ENCOUNTER FOR MONITORING COUMADIN THERAPY: ICD-10-CM

## 2019-12-02 DIAGNOSIS — Z86.718 HISTORY OF VENOUS THROMBOEMBOLISM: ICD-10-CM

## 2019-12-02 DIAGNOSIS — Z79.899 ENCOUNTER FOR LONG-TERM (CURRENT) USE OF MEDICATIONS: ICD-10-CM

## 2019-12-02 DIAGNOSIS — Z79.4 TYPE 2 DIABETES MELLITUS WITHOUT COMPLICATION, WITH LONG-TERM CURRENT USE OF INSULIN (HCC): Primary | ICD-10-CM

## 2019-12-02 RX ORDER — LANSOPRAZOLE 30 MG/1
CAPSULE, DELAYED RELEASE ORAL
Qty: 90 CAP | Refills: 3 | Status: SHIPPED | OUTPATIENT
Start: 2019-12-02 | End: 2020-12-09 | Stop reason: SDUPTHER

## 2019-12-02 RX ORDER — TELMISARTAN 80 MG/1
80 TABLET ORAL DAILY
Qty: 90 TAB | Refills: 3 | Status: SHIPPED | OUTPATIENT
Start: 2019-12-02 | End: 2020-12-09 | Stop reason: SDUPTHER

## 2019-12-02 RX ORDER — FINASTERIDE 5 MG/1
TABLET, FILM COATED ORAL
Qty: 90 TAB | Refills: 3 | Status: SHIPPED | OUTPATIENT
Start: 2019-12-02 | End: 2020-12-09 | Stop reason: SDUPTHER

## 2019-12-02 RX ORDER — WARFARIN 4 MG/1
TABLET ORAL
Qty: 180 TAB | Refills: 4 | Status: ON HOLD | OUTPATIENT
Start: 2019-12-02 | End: 2020-01-28

## 2019-12-02 RX ORDER — INSULIN GLARGINE 100 [IU]/ML
INJECTION, SOLUTION SUBCUTANEOUS
Qty: 60 ML | Refills: 3 | Status: SHIPPED | OUTPATIENT
Start: 2019-12-02 | End: 2020-12-09 | Stop reason: SDUPTHER

## 2019-12-02 RX ORDER — METFORMIN HYDROCHLORIDE 1000 MG/1
TABLET ORAL
Qty: 180 TAB | Refills: 3 | Status: SHIPPED | OUTPATIENT
Start: 2019-12-02 | End: 2020-12-09 | Stop reason: SDUPTHER

## 2019-12-02 RX ORDER — METOPROLOL SUCCINATE 25 MG/1
25 TABLET, EXTENDED RELEASE ORAL DAILY
Qty: 90 TAB | Refills: 3 | Status: SHIPPED | OUTPATIENT
Start: 2019-12-02 | End: 2020-12-09 | Stop reason: SDUPTHER

## 2019-12-02 NOTE — PROGRESS NOTES
Subjective:     Chief Complaint   Patient presents with    Follow-up        He  is a 80y.o. year old male who presents for evaluation of:  Doing well today. ILD - Ct to struggle with dyspnea. Working with Dr. Helena Fuentes on this. Had CT Chest on 11/19/18 that came back with \"Stable fibrotic lung disease, not IPF/UIP pattern. Mild volume loss; no traction bronchiectasis. \"  Largely thought to be related to work exposure over the years. Prev had NSTEMI and had another cardiac cath 8/2018 that looked ok including no stenosis of prev stenting. Following with Dr. Robina Steen. 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 See anti-coag tab for history and dosing with plan. Has done well with home INR checks. Hyperlipidemia & HTN  Pt is doing well on current meds with no medication side effects noted  No new myalgias, no joint pains, no weakness  No TIA's, no chest pain on exertion, + dyspnea on exertion as above, no swelling of ankles. Exercising - Minimal, uses walker and occ will walk up to 1/2 mile  Dieting - No  Smoking - No     Lab Results   Component Value Date/Time    Cholesterol, total 202 (H) 08/28/2019 10:56 AM    HDL Cholesterol 46 08/28/2019 10:56 AM    LDL, calculated 125 (H) 08/28/2019 10:56 AM    Triglyceride 154 (H) 08/28/2019 10:56 AM     ROS  Gen - no fever/chills  Resp - per HPI  CV - no chest pain.   Rest per HPI    Objective:     Vitals:    12/02/19 0925   BP: 135/69   Pulse: 73   Resp: 16   Temp: 97.8 °F (36.6 °C)   TempSrc: Oral   SpO2: 97%   Weight: 265 lb (120.2 kg)   Height: 5' 11\" (1.803 m)     Physical Examination:   General appearance - alert, well appearing, and in no distress  Eyes -sclera anicteric  Neck - supple, no significant adenopathy, no thyromegaly  Chest - clear to auscultation, no wheezes, rales or rhonchi, symmetric air entry  Heart - normal rate, regular rhythm, normal S1, S2, no murmurs, rubs, clicks or gallops  Neurological - alert, oriented, normal speech, no focal findings or movement disorder noted  Extr - trace bilat edema  Psych - normal mood and affect  Skin - no petechia or ecchymosis    Allergies   Allergen Reactions    Statins-Hmg-Coa Reductase Inhibitors Myalgia     Tried several statins. Social History     Socioeconomic History    Marital status:      Spouse name: Not on file    Number of children: Not on file    Years of education: Not on file    Highest education level: Not on file   Tobacco Use    Smoking status: Former Smoker     Packs/day: 1.00     Last attempt to quit: 1975     Years since quittin.9    Smokeless tobacco: Never Used   Substance and Sexual Activity    Alcohol use: Yes     Comment: once in a while    Drug use: No    Sexual activity: Yes     Partners: Female      Family History   Problem Relation Age of Onset    Heart Disease Brother     Bleeding Prob Father     Tuberculosis Sister       Past Surgical History:   Procedure Laterality Date    HX ORTHOPAEDIC Left     Crushed/left index finger amputee    HX ORTHOPAEDIC      heal spur removed left foot    HX OTHER SURGICAL      hand surgery, heal spur L    UPPER GI ENDOSCOPY,BIOPSY  2018         UPPER GI ENDOSCOPY,DILATN W GUIDE  2018           Past Medical History:   Diagnosis Date    Arthritis     Back injury     CAD (coronary artery disease)     Stent replacement    Diabetes ([facility] [address]     GERD (gastroesophageal reflux disease)     Hypertension     Muscle cramps     PUD (peptic ulcer disease)     Reflux     Thromboembolus (HCC)     L BLE      Current Outpatient Medications   Medication Sig Dispense Refill    dapagliflozin (FARXIGA) 5 mg tab tablet Take 1 Tab by mouth daily.  90 Tab 3    warfarin (COUMADIN) 4 mg tablet TAKE 2 TABLETS DAILY 180 Tab 4    finasteride (PROSCAR) 5 mg tablet TAKE ONE TABLET DAILY 90 Tab 3    lansoprazole (PREVACID) 30 mg capsule TAKE 1 CAPSULE DAILY BEFORE BREAKFAST 90 Cap 3    insulin glargine (LANTUS SOLOSTAR U-100 INSULIN) 100 unit/mL (3 mL) inpn Indications: PATIENT TAKING 45 UNITS  Indications: PATIENT TAKING 45 UNITS 60 mL 3    metFORMIN (GLUCOPHAGE) 1,000 mg tablet TAKE 1 TABLET TWICE A  Tab 3    metoprolol succinate (TOPROL-XL) 25 mg XL tablet Take 1 Tab by mouth daily. 90 Tab 3    semaglutide (OZEMPIC) 0.25 mg/0.2 mL (2 mg/1.5 mL) sub-q pen INJECT 0.5 MG UNDER THE SKIN EVERY 7 DAYS (REPLACES BYDUREON. FOR DIABETES) 4.5 mL 3    telmisartan (MICARDIS) 80 mg tablet Take 1 Tab by mouth daily. 90 Tab 3    SYMBICORT 160-4.5 mcg/actuation HFAA Take 2 Puffs by inhalation two (2) times a day.  clemastine 2.68 mg tab tablet take 1 tablet by mouth twice a day  0    ALTAGRACIA PEN NEEDLE 32 gauge x 5/32\" ndle USE 1 NEEDLE UNDER THE SKIN DAILY 100 Pen Needle 3    nitroglycerin (NITROSTAT) 0.4 mg SL tablet place 1 tablet under the tongue AT FIRST SIGN OF ATTACK, MAY REPE. ..  (REFER TO PRESCRIPTION NOTES). 1    ALTAGRACIA PEN NEEDLE 32 gauge x 5/32\" ndle       aspirin 81 mg chewable tablet Take 1 Tab by mouth daily. 90 Tab 1    magnesium oxide (MAG-OX) 400 mg tablet Take 800 mg by mouth two (2) times a day.  diclofenac (VOLTAREN) 1 % gel Apply  to affected area four (4) times daily. 100 g 5    cholecalciferol, VITAMIN D3, (VITAMIN D3) 5,000 unit tab tablet Take 1,000 Units by mouth daily. Nature Made       cyanocobalamin (VITAMIN B-12) 1,000 mcg tablet Take 1,000 mcg by mouth daily. Nature Made      cyclobenzaprine (FLEXERIL) 10 mg tablet Take 10 mg by mouth three (3) times daily as needed for Muscle Spasm(s). Assessment/ Plan:   Diagnoses and all orders for this visit:    1. Type 2 diabetes mellitus without complication, with long-term current use of insulin (HCC) - well controlled, switching from [facility] to [facility] d/t insurance coverage change. -     METABOLIC PANEL, COMPREHENSIVE  -     dapagliflozin (FARXIGA) 5 mg tab tablet; Take 1 Tab by mouth daily.     2. Essential hypertension - controlled  -     METABOLIC PANEL, COMPREHENSIVE    3. Chronic interstitial lung disease (HCC)working with pulmonology    4. Coronary artery disease due to lipid rich plaque  5. S/P angioplasty with stent  12. NSTEMI (non-ST elevated myocardial infarction) ([facility] [address]  6. Hyperlipidemia LDL goal <100  - following with Cardiology. Statin intolerant. Would consider PCSK9i    7. History of venous thromboembolism  8. Hypercoagulable state ([facility] [address]  9. Encounter for monitoring Coumadin therapy  - has been therapeutic, rechecking today  -     PROTHROMBIN TIME + INR    10. Gastroesophageal reflux disease, esophagitis presence not specifiedcontinue with PPI. Goal of weaning off PPI but patient unable to tolerate  -     METABOLIC PANEL, COMPREHENSIVE  -     CBC W/O DIFF    11. Benign prostatic hyperplasia, unspecified whether lower urinary tract symptoms present  -     PSA, DIAGNOSTIC (PROSTATE SPECIFIC AG)    13. Encounter for long-term (current) use of medications  -     METABOLIC PANEL, COMPREHENSIVE  -     PROTHROMBIN TIME + INR  -     CBC W/O DIFF  -     PSA, DIAGNOSTIC (PROSTATE SPECIFIC AG)    I have discussed the diagnosis with the patient and the intended plan as seen in the above orders. The patient has received an after-visit summary and questions were answered concerning future plans. I have discussed medication side effects and warnings with the patient as well. The patient verbalizes understanding and agreement with the plan. Follow-up and Dispositions    · Return in about 3 months (around 3/2/2020) for Regular Follow up.

## 2019-12-02 NOTE — PROGRESS NOTES
Chief Complaint   Patient presents with    Follow-up     1. Have you been to the ER, urgent care clinic since your last visit? Hospitalized since your last visit? No    2. Have you seen or consulted any other health care providers outside of the [address] [place] [address] since your last visit? Include any pap smears or colon screening. Dr. Jose Davis - pulmonology     Last INR check at home 1.9 on  11/1/19.

## 2019-12-03 LAB
ALBUMIN SERPL-MCNC: 4.2 G/DL (ref 3.5–4.7)
ALBUMIN/GLOB SERPL: 1.6 {RATIO} (ref 1.2–2.2)
ALP SERPL-CCNC: 75 IU/L (ref 39–117)
ALT SERPL-CCNC: 12 IU/L (ref 0–44)
AST SERPL-CCNC: 31 IU/L (ref 0–40)
BILIRUB SERPL-MCNC: 0.5 MG/DL (ref 0–1.2)
BUN SERPL-MCNC: 14 MG/DL (ref 8–27)
BUN/CREAT SERPL: 11 (ref 10–24)
CALCIUM SERPL-MCNC: 9.6 MG/DL (ref 8.6–10.2)
CHLORIDE SERPL-SCNC: 102 MMOL/L (ref 96–106)
CO2 SERPL-SCNC: 21 MMOL/L (ref 20–29)
CREAT SERPL-MCNC: 1.24 MG/DL (ref 0.76–1.27)
ERYTHROCYTE [DISTWIDTH] IN BLOOD BY AUTOMATED COUNT: 14.1 % (ref 12.3–15.4)
GLOBULIN SER CALC-MCNC: 2.7 G/DL (ref 1.5–4.5)
GLUCOSE SERPL-MCNC: 123 MG/DL (ref 65–99)
HCT VFR BLD AUTO: 42.8 % (ref 37.5–51)
HGB BLD-MCNC: 14.3 G/DL (ref 13–17.7)
INR PPP: 1.7 (ref 0.8–1.2)
INTERPRETATION: NORMAL
Lab: NORMAL
MCH RBC QN AUTO: 29.9 PG (ref 26.6–33)
MCHC RBC AUTO-ENTMCNC: 33.4 G/DL (ref 31.5–35.7)
MCV RBC AUTO: 90 FL (ref 79–97)
PLATELET # BLD AUTO: 279 X10E3/UL (ref 150–450)
POTASSIUM SERPL-SCNC: 4.9 MMOL/L (ref 3.5–5.2)
PROT SERPL-MCNC: 6.9 G/DL (ref 6–8.5)
PROTHROMBIN TIME: 16.8 SEC (ref 9.1–12)
PSA SERPL-MCNC: 0.3 NG/ML (ref 0–4)
RBC # BLD AUTO: 4.78 X10E6/UL (ref 4.14–5.8)
SODIUM SERPL-SCNC: 138 MMOL/L (ref 134–144)
WBC # BLD AUTO: 6.4 X10E3/UL (ref 3.4–10.8)

## 2020-01-28 ENCOUNTER — HOSPITAL ENCOUNTER (OUTPATIENT)
Age: 85
Discharge: HOME | End: 2020-01-29
Attending: INTERNAL MEDICINE | Admitting: INTERNAL MEDICINE
Payer: MEDICARE

## 2020-01-28 DIAGNOSIS — R07.9 CHEST PAIN, UNSPECIFIED TYPE: ICD-10-CM

## 2020-01-28 LAB
ACT BLD: 521 SECS (ref 79–138)
ATRIAL RATE: 59 BPM
CALCULATED P AXIS, ECG09: 63 DEGREES
CALCULATED R AXIS, ECG10: -63 DEGREES
CALCULATED T AXIS, ECG11: 67 DEGREES
DIAGNOSIS, 93000: NORMAL
EST. AVERAGE GLUCOSE BLD GHB EST-MCNC: 131 MG/DL
GLUCOSE BLD STRIP.AUTO-MCNC: 113 MG/DL (ref 65–100)
GLUCOSE BLD STRIP.AUTO-MCNC: 132 MG/DL (ref 65–100)
GLUCOSE BLD STRIP.AUTO-MCNC: 90 MG/DL (ref 65–100)
HBA1C MFR BLD: 6.2 % (ref 4–5.6)
P-R INTERVAL, ECG05: 268 MS
Q-T INTERVAL, ECG07: 480 MS
QRS DURATION, ECG06: 150 MS
QTC CALCULATION (BEZET), ECG08: 475 MS
SERVICE CMNT-IMP: ABNORMAL
SERVICE CMNT-IMP: ABNORMAL
SERVICE CMNT-IMP: NORMAL
VENTRICULAR RATE, ECG03: 59 BPM

## 2020-01-28 PROCEDURE — 74011250636 HC RX REV CODE- 250/636: Performed by: INTERNAL MEDICINE

## 2020-01-28 PROCEDURE — 77030019569 HC BND COMPR RAD TERU -B: Performed by: INTERNAL MEDICINE

## 2020-01-28 PROCEDURE — 77030019698 HC SYR ANGI MDLON MRTM -A: Performed by: INTERNAL MEDICINE

## 2020-01-28 PROCEDURE — 74011000250 HC RX REV CODE- 250: Performed by: INTERNAL MEDICINE

## 2020-01-28 PROCEDURE — C1769 GUIDE WIRE: HCPCS | Performed by: INTERNAL MEDICINE

## 2020-01-28 PROCEDURE — 93005 ELECTROCARDIOGRAM TRACING: CPT

## 2020-01-28 PROCEDURE — C1874 STENT, COATED/COV W/DEL SYS: HCPCS | Performed by: INTERNAL MEDICINE

## 2020-01-28 PROCEDURE — 77030018729 HC ELECTRD DEFIB PAD CARD -B: Performed by: INTERNAL MEDICINE

## 2020-01-28 PROCEDURE — 93458 L HRT ARTERY/VENTRICLE ANGIO: CPT | Performed by: INTERNAL MEDICINE

## 2020-01-28 PROCEDURE — 85347 COAGULATION TIME ACTIVATED: CPT

## 2020-01-28 PROCEDURE — 77030010221 HC SPLNT WR POS TELE -B: Performed by: INTERNAL MEDICINE

## 2020-01-28 PROCEDURE — 36415 COLL VENOUS BLD VENIPUNCTURE: CPT

## 2020-01-28 PROCEDURE — 99153 MOD SED SAME PHYS/QHP EA: CPT | Performed by: INTERNAL MEDICINE

## 2020-01-28 PROCEDURE — 77030008543 HC TBNG MON PRSS MRTM -A: Performed by: INTERNAL MEDICINE

## 2020-01-28 PROCEDURE — C1725 CATH, TRANSLUMIN NON-LASER: HCPCS | Performed by: INTERNAL MEDICINE

## 2020-01-28 PROCEDURE — 74011250637 HC RX REV CODE- 250/637: Performed by: INTERNAL MEDICINE

## 2020-01-28 PROCEDURE — 92928 PRQ TCAT PLMT NTRAC ST 1 LES: CPT | Performed by: INTERNAL MEDICINE

## 2020-01-28 PROCEDURE — 74011000258 HC RX REV CODE- 258: Performed by: INTERNAL MEDICINE

## 2020-01-28 PROCEDURE — 82962 GLUCOSE BLOOD TEST: CPT

## 2020-01-28 PROCEDURE — 77030028837 HC SYR ANGI PWR INJ COEU -A: Performed by: INTERNAL MEDICINE

## 2020-01-28 PROCEDURE — 74011636320 HC RX REV CODE- 636/320: Performed by: INTERNAL MEDICINE

## 2020-01-28 PROCEDURE — 83036 HEMOGLOBIN GLYCOSYLATED A1C: CPT

## 2020-01-28 PROCEDURE — 99152 MOD SED SAME PHYS/QHP 5/>YRS: CPT | Performed by: INTERNAL MEDICINE

## 2020-01-28 PROCEDURE — 77030004549 HC CATH ANGI DX PRF MRTM -A: Performed by: INTERNAL MEDICINE

## 2020-01-28 PROCEDURE — C1894 INTRO/SHEATH, NON-LASER: HCPCS | Performed by: INTERNAL MEDICINE

## 2020-01-28 PROCEDURE — 77030019697 HC SYR ANGI INFL MRTM -B: Performed by: INTERNAL MEDICINE

## 2020-01-28 PROCEDURE — C1887 CATHETER, GUIDING: HCPCS | Performed by: INTERNAL MEDICINE

## 2020-01-28 DEVICE — STENT RONYX22538UX RESOLUTE ONYX 2.25X38
Type: IMPLANTABLE DEVICE | Status: FUNCTIONAL
Brand: RESOLUTE ONYX™

## 2020-01-28 RX ORDER — SODIUM CHLORIDE 0.9 % (FLUSH) 0.9 %
5-40 SYRINGE (ML) INJECTION EVERY 8 HOURS
Status: DISCONTINUED | OUTPATIENT
Start: 2020-01-28 | End: 2020-01-29 | Stop reason: HOSPADM

## 2020-01-28 RX ORDER — DOCUSATE SODIUM 100 MG/1
100 CAPSULE, LIQUID FILLED ORAL 2 TIMES DAILY
Status: DISCONTINUED | OUTPATIENT
Start: 2020-01-28 | End: 2020-01-29 | Stop reason: HOSPADM

## 2020-01-28 RX ORDER — FENTANYL CITRATE 50 UG/ML
INJECTION, SOLUTION INTRAMUSCULAR; INTRAVENOUS AS NEEDED
Status: DISCONTINUED | OUTPATIENT
Start: 2020-01-28 | End: 2020-01-28 | Stop reason: HOSPADM

## 2020-01-28 RX ORDER — KETOROLAC TROMETHAMINE 30 MG/ML
15 INJECTION, SOLUTION INTRAMUSCULAR; INTRAVENOUS
Status: COMPLETED | OUTPATIENT
Start: 2020-01-28 | End: 2020-01-28

## 2020-01-28 RX ORDER — SODIUM CHLORIDE 0.9 % (FLUSH) 0.9 %
5-40 SYRINGE (ML) INJECTION AS NEEDED
Status: DISCONTINUED | OUTPATIENT
Start: 2020-01-28 | End: 2020-01-29 | Stop reason: HOSPADM

## 2020-01-28 RX ORDER — FINASTERIDE 5 MG/1
10 TABLET, FILM COATED ORAL DAILY
Status: DISCONTINUED | OUTPATIENT
Start: 2020-01-29 | End: 2020-01-29 | Stop reason: HOSPADM

## 2020-01-28 RX ORDER — ARFORMOTEROL TARTRATE 15 UG/2ML
15 SOLUTION RESPIRATORY (INHALATION)
Status: DISCONTINUED | OUTPATIENT
Start: 2020-01-28 | End: 2020-01-29 | Stop reason: HOSPADM

## 2020-01-28 RX ORDER — PANTOPRAZOLE SODIUM 40 MG/1
40 TABLET, DELAYED RELEASE ORAL
Status: DISCONTINUED | OUTPATIENT
Start: 2020-01-29 | End: 2020-01-29 | Stop reason: HOSPADM

## 2020-01-28 RX ORDER — HEPARIN SODIUM 1000 [USP'U]/ML
INJECTION, SOLUTION INTRAVENOUS; SUBCUTANEOUS AS NEEDED
Status: DISCONTINUED | OUTPATIENT
Start: 2020-01-28 | End: 2020-01-28

## 2020-01-28 RX ORDER — HEPARIN SODIUM 200 [USP'U]/100ML
INJECTION, SOLUTION INTRAVENOUS
Status: DISCONTINUED | OUTPATIENT
Start: 2020-01-28 | End: 2020-01-28

## 2020-01-28 RX ORDER — LANOLIN ALCOHOL/MO/W.PET/CERES
800 CREAM (GRAM) TOPICAL 2 TIMES DAILY
Status: DISCONTINUED | OUTPATIENT
Start: 2020-01-28 | End: 2020-01-29 | Stop reason: HOSPADM

## 2020-01-28 RX ORDER — LOSARTAN POTASSIUM 50 MG/1
100 TABLET ORAL DAILY
Status: DISCONTINUED | OUTPATIENT
Start: 2020-01-29 | End: 2020-01-29 | Stop reason: HOSPADM

## 2020-01-28 RX ORDER — INSULIN LISPRO 100 [IU]/ML
INJECTION, SOLUTION INTRAVENOUS; SUBCUTANEOUS
Status: DISCONTINUED | OUTPATIENT
Start: 2020-01-28 | End: 2020-01-29 | Stop reason: HOSPADM

## 2020-01-28 RX ORDER — BUDESONIDE 0.5 MG/2ML
500 INHALANT ORAL
Status: DISCONTINUED | OUTPATIENT
Start: 2020-01-28 | End: 2020-01-29 | Stop reason: HOSPADM

## 2020-01-28 RX ORDER — METOPROLOL SUCCINATE 25 MG/1
25 TABLET, EXTENDED RELEASE ORAL DAILY
Status: DISCONTINUED | OUTPATIENT
Start: 2020-01-28 | End: 2020-01-28

## 2020-01-28 RX ORDER — MORPHINE SULFATE 10 MG/ML
4 INJECTION, SOLUTION INTRAMUSCULAR; INTRAVENOUS
Status: DISCONTINUED | OUTPATIENT
Start: 2020-01-28 | End: 2020-01-29 | Stop reason: HOSPADM

## 2020-01-28 RX ORDER — CYCLOBENZAPRINE HCL 10 MG
10 TABLET ORAL
Status: DISCONTINUED | OUTPATIENT
Start: 2020-01-28 | End: 2020-01-29 | Stop reason: HOSPADM

## 2020-01-28 RX ORDER — ONDANSETRON 2 MG/ML
4 INJECTION INTRAMUSCULAR; INTRAVENOUS
Status: DISCONTINUED | OUTPATIENT
Start: 2020-01-28 | End: 2020-01-29 | Stop reason: HOSPADM

## 2020-01-28 RX ORDER — NALOXONE HYDROCHLORIDE 0.4 MG/ML
0.4 INJECTION, SOLUTION INTRAMUSCULAR; INTRAVENOUS; SUBCUTANEOUS AS NEEDED
Status: DISCONTINUED | OUTPATIENT
Start: 2020-01-28 | End: 2020-01-29 | Stop reason: HOSPADM

## 2020-01-28 RX ORDER — ACETAMINOPHEN 325 MG/1
650 TABLET ORAL
Status: DISCONTINUED | OUTPATIENT
Start: 2020-01-28 | End: 2020-01-29 | Stop reason: HOSPADM

## 2020-01-28 RX ORDER — MAGNESIUM SULFATE 100 %
4 CRYSTALS MISCELLANEOUS AS NEEDED
Status: DISCONTINUED | OUTPATIENT
Start: 2020-01-28 | End: 2020-01-29 | Stop reason: HOSPADM

## 2020-01-28 RX ORDER — HYDROCODONE BITARTRATE AND ACETAMINOPHEN 5; 325 MG/1; MG/1
1 TABLET ORAL
Status: DISCONTINUED | OUTPATIENT
Start: 2020-01-28 | End: 2020-01-29 | Stop reason: HOSPADM

## 2020-01-28 RX ORDER — VERAPAMIL HYDROCHLORIDE 2.5 MG/ML
INJECTION, SOLUTION INTRAVENOUS AS NEEDED
Status: DISCONTINUED | OUTPATIENT
Start: 2020-01-28 | End: 2020-01-28

## 2020-01-28 RX ORDER — INSULIN GLARGINE 100 [IU]/ML
45 INJECTION, SOLUTION SUBCUTANEOUS DAILY
Status: DISCONTINUED | OUTPATIENT
Start: 2020-01-29 | End: 2020-01-29 | Stop reason: HOSPADM

## 2020-01-28 RX ORDER — CLOPIDOGREL BISULFATE 75 MG/1
75 TABLET ORAL DAILY
Status: DISCONTINUED | OUTPATIENT
Start: 2020-01-29 | End: 2020-01-29 | Stop reason: HOSPADM

## 2020-01-28 RX ORDER — MIDAZOLAM HYDROCHLORIDE 1 MG/ML
INJECTION, SOLUTION INTRAMUSCULAR; INTRAVENOUS AS NEEDED
Status: DISCONTINUED | OUTPATIENT
Start: 2020-01-28 | End: 2020-01-28 | Stop reason: HOSPADM

## 2020-01-28 RX ORDER — LIDOCAINE HYDROCHLORIDE 10 MG/ML
INJECTION, SOLUTION EPIDURAL; INFILTRATION; INTRACAUDAL; PERINEURAL AS NEEDED
Status: DISCONTINUED | OUTPATIENT
Start: 2020-01-28 | End: 2020-01-28

## 2020-01-28 RX ORDER — GUAIFENESIN 100 MG/5ML
81 LIQUID (ML) ORAL DAILY
Status: DISCONTINUED | OUTPATIENT
Start: 2020-01-29 | End: 2020-01-29 | Stop reason: HOSPADM

## 2020-01-28 RX ORDER — METOPROLOL SUCCINATE 25 MG/1
25 TABLET, EXTENDED RELEASE ORAL DAILY
Status: DISCONTINUED | OUTPATIENT
Start: 2020-01-29 | End: 2020-01-29 | Stop reason: HOSPADM

## 2020-01-28 RX ORDER — CLOPIDOGREL BISULFATE 75 MG/1
TABLET ORAL AS NEEDED
Status: DISCONTINUED | OUTPATIENT
Start: 2020-01-28 | End: 2020-01-28 | Stop reason: HOSPADM

## 2020-01-28 RX ADMIN — Medication 40 ML: at 22:49

## 2020-01-28 RX ADMIN — Medication 800 MG: at 17:40

## 2020-01-28 RX ADMIN — Medication 10 ML: at 12:33

## 2020-01-28 RX ADMIN — KETOROLAC TROMETHAMINE 15 MG: 30 INJECTION, SOLUTION INTRAMUSCULAR at 17:26

## 2020-01-28 RX ADMIN — MORPHINE SULFATE 4 MG: 10 INJECTION, SOLUTION INTRAMUSCULAR; INTRAVENOUS at 15:21

## 2020-01-28 NOTE — Clinical Note
Lesion located in the Mid LAD. Balloon inserted. Balloon inflated using single inflation technique. Pressure = 18 mariam; Duration = 20 sec.

## 2020-01-28 NOTE — CARDIO/PULMONARY
Cardiac Rehab Note: chart review Pt is an 79 yo s/p PCI/ZACKERY today. Per pt wife pt with prior history of stents. Smoking history assessed. Patient is a former smoker. Smoking Cessation Program link has not been added to the AVS. EF not finalized at time of visit. CAD education folder, with heart heathy diet, warning signs, heart facts, catheterization brochure, and out patient cardiac rehab program provided to Zoanne Setting on 1/28/2020. Educated wife while pt nodded off using teach back method. Reviewed CAD diagnosis definition and purpose of intervention. Discussed risk factors for CAD to include the following: family history, elevated BMI, hyperlipidemia, hypertension, diabetes, and stress. Discussed Heart Healthy/Low Sodium (less than 2000 mg) diet. Reviewed the importance of medication compliance, follow up appointments with cardiologist, signs and symptoms of angina, and what to report to physician after discharge. Emphasized the value of cardiac rehab. Discussed Cardiac Rehab Program format, benefits, and encouraged enrollment to assist with risk modification and management. Wife reporting that they have some home exercise equipment and that Dr. Augustina Ivan mentioned some kind of rehab. Encouraged to sign up for CR during this admission due to start date estimated to be in March. Pt commented, \"Don't sign me up for anything right now. \"  CR will f/u with pt during this admission or via phone after discharge, in addition contact numbers to [address] [place] [address] provided. Zoanne Setting verbalized understanding with questions answered.   Sherwin Whitlock RN

## 2020-01-28 NOTE — Clinical Note
TRANSFER - OUT REPORT:     Verbal report given to: Marcy Turner. Report consisted of patient's Situation, Background, Assessment and   Recommendations(SBAR). Opportunity for questions and clarification was provided. Patient transported with a Registered Nurse. Patient transported to: Cath lab recovery.

## 2020-01-28 NOTE — Clinical Note
Lesion located in the Mid LAD. Balloon inserted. Balloon inflated using multiple inflations inflation technique. Pressure = 14 mariam; Duration = 15 sec. Inflation 2: Pressure: 14 mariam; Duration: 15 sec.

## 2020-01-28 NOTE — PROGRESS NOTES
12:05 - Rec'd patient from CCL. R radial CDI. TR band at 10mL. Angiomax infusing at 0.5mg/kg/hr until 13:15.      15:21 - c/o headache. Prn morphine given. 16:15 - TR band off. 4x4/Tegaderm applied. Tolerated well.      17:26 - Order rec'd for 1x dose Toradol 15mg. Given for headache. 19:00 - Bedside report given to Ronald gould RN.

## 2020-01-28 NOTE — Clinical Note
Lesion: Located in the Mid LAD. Stent inserted. Stent deployed. Single technique used. First inflation pressure = 14 mariam; inflation time: 20 sec.

## 2020-01-28 NOTE — PROGRESS NOTES
Brief Procedure Note    Patient: Prachi Bills MRN: [id number]  SSN: [id number]    YOB: 1934  Age: 80 y.o. Sex: male      Date of Procedure: 1/28/2020     Pre-procedure Diagnosis: Abn nuc    Post-procedure Diagnosis: 1v CAD, normal LVEDP    Procedure: LHC, cors, PTCA/PCI of LAD w/ a ZACKERY    Performed By: Margaret Casas III, DO     Anesthesia: Moderate Sedation    Estimated Blood Loss: Less than 10 mL      Specimens:  None    Findings: as above    Complications: None    Implants: 1 [place] ZACKERY    Recommendations: Continue medical therapy.     Signed By: Darlene Biggs DO     January 28, 2020

## 2020-01-29 VITALS
BODY MASS INDEX: 37.94 KG/M2 | WEIGHT: 265 LBS | HEART RATE: 73 BPM | DIASTOLIC BLOOD PRESSURE: 71 MMHG | RESPIRATION RATE: 15 BRPM | OXYGEN SATURATION: 97 % | HEIGHT: 70 IN | SYSTOLIC BLOOD PRESSURE: 130 MMHG | TEMPERATURE: 98 F

## 2020-01-29 LAB
ANION GAP SERPL CALC-SCNC: 7 MMOL/L (ref 5–15)
BASOPHILS # BLD: 0.1 K/UL (ref 0–0.1)
BASOPHILS NFR BLD: 1 % (ref 0–1)
BUN SERPL-MCNC: 16 MG/DL (ref 6–20)
BUN/CREAT SERPL: 13 (ref 12–20)
CALCIUM SERPL-MCNC: 8.3 MG/DL (ref 8.5–10.1)
CHLORIDE SERPL-SCNC: 105 MMOL/L (ref 97–108)
CO2 SERPL-SCNC: 24 MMOL/L (ref 21–32)
CREAT SERPL-MCNC: 1.21 MG/DL (ref 0.7–1.3)
DIFFERENTIAL METHOD BLD: NORMAL
EOSINOPHIL # BLD: 0.3 K/UL (ref 0–0.4)
EOSINOPHIL NFR BLD: 5 % (ref 0–7)
ERYTHROCYTE [DISTWIDTH] IN BLOOD BY AUTOMATED COUNT: 14.1 % (ref 11.5–14.5)
GLUCOSE SERPL-MCNC: 124 MG/DL (ref 65–100)
HCT VFR BLD AUTO: 37 % (ref 36.6–50.3)
HGB BLD-MCNC: 12.5 G/DL (ref 12.1–17)
IMM GRANULOCYTES # BLD AUTO: 0 K/UL (ref 0–0.04)
IMM GRANULOCYTES NFR BLD AUTO: 0 % (ref 0–0.5)
LYMPHOCYTES # BLD: 1 K/UL (ref 0.8–3.5)
LYMPHOCYTES NFR BLD: 20 % (ref 12–49)
MCH RBC QN AUTO: 30.1 PG (ref 26–34)
MCHC RBC AUTO-ENTMCNC: 33.8 G/DL (ref 30–36.5)
MCV RBC AUTO: 89.2 FL (ref 80–99)
MONOCYTES # BLD: 0.5 K/UL (ref 0–1)
MONOCYTES NFR BLD: 9 % (ref 5–13)
NEUTS SEG # BLD: 3.1 K/UL (ref 1.8–8)
NEUTS SEG NFR BLD: 65 % (ref 32–75)
NRBC # BLD: 0 K/UL (ref 0–0.01)
NRBC BLD-RTO: 0 PER 100 WBC
PLATELET # BLD AUTO: 207 K/UL (ref 150–400)
PMV BLD AUTO: 9.1 FL (ref 8.9–12.9)
POTASSIUM SERPL-SCNC: 4.3 MMOL/L (ref 3.5–5.1)
RBC # BLD AUTO: 4.15 M/UL (ref 4.1–5.7)
SODIUM SERPL-SCNC: 136 MMOL/L (ref 136–145)
WBC # BLD AUTO: 4.8 K/UL (ref 4.1–11.1)

## 2020-01-29 PROCEDURE — 74011636637 HC RX REV CODE- 636/637: Performed by: INTERNAL MEDICINE

## 2020-01-29 PROCEDURE — 36415 COLL VENOUS BLD VENIPUNCTURE: CPT

## 2020-01-29 PROCEDURE — 74011250637 HC RX REV CODE- 250/637: Performed by: INTERNAL MEDICINE

## 2020-01-29 PROCEDURE — 80048 BASIC METABOLIC PNL TOTAL CA: CPT

## 2020-01-29 PROCEDURE — 85025 COMPLETE CBC W/AUTO DIFF WBC: CPT

## 2020-01-29 RX ORDER — CLOPIDOGREL BISULFATE 75 MG/1
75 TABLET ORAL DAILY
Qty: 30 TAB | Refills: 11 | Status: SHIPPED | OUTPATIENT
Start: 2020-01-29 | End: 2021-07-12

## 2020-01-29 RX ADMIN — PANTOPRAZOLE SODIUM 40 MG: 40 TABLET, DELAYED RELEASE ORAL at 07:43

## 2020-01-29 RX ADMIN — METOPROLOL SUCCINATE 25 MG: 25 TABLET, EXTENDED RELEASE ORAL at 07:42

## 2020-01-29 RX ADMIN — Medication 10 ML: at 03:47

## 2020-01-29 RX ADMIN — INSULIN GLARGINE 45 UNITS: 100 INJECTION, SOLUTION SUBCUTANEOUS at 07:43

## 2020-01-29 RX ADMIN — FINASTERIDE 10 MG: 5 TABLET, FILM COATED ORAL at 07:43

## 2020-01-29 RX ADMIN — ASPIRIN 81 MG 81 MG: 81 TABLET ORAL at 07:42

## 2020-01-29 RX ADMIN — Medication 800 MG: at 07:43

## 2020-01-29 RX ADMIN — DOCUSATE SODIUM 100 MG: 100 CAPSULE, LIQUID FILLED ORAL at 07:42

## 2020-01-29 RX ADMIN — CLOPIDOGREL BISULFATE 75 MG: 75 TABLET ORAL at 07:42

## 2020-01-29 NOTE — PROGRESS NOTES
1940  Patient ambulated in the hallway twice, patient has no complaints. Patient tolerated this activity well    shift change report given to Jodi Nichols  (oncoming nurse) by Jean Johnson (offgoing nurse). Report included the following information SBAR.

## 2020-01-29 NOTE — DISCHARGE INSTRUCTIONS
BLOOD THINNER INSTRUCTIONS  ASPIRIN 81MG DAILY FOR A MONTH  XARELTO 15MG FOR A MONTH  PLAVIX 75MG DAILY FOR A YEAR  IN ONE MONTH, STOP ASPIRIN AND GO BACK TO TAKING XARELTO 20MG ALONG WITH THE PLAVIX  IN ONE YEAR WE WILL STOP PLAVIX AND RESUME ASPIRIN WITH THE [address]                 [place] [address] [facility], [address]   [phone]  [address]    [email or web address]    Patient Discharge Instructions    Davie Jensen / [id number] : 1934    Admitted 2020 Discharged: 2020       · It is important that you take the medication exactly as they are prescribed. · Keep your medication in the bottles provided by the pharmacist and keep a list of the medication names, dosages, and times to be taken in your wallet. · Do not take other medications without consulting your doctor. BRING ALL OF YOUR MEDICINES TO YOUR OFFICE VISIT with Brayden Jack DO or my nurse practitioner, Guille Diaz. .    Follow-up with Brayden Jack DO or my nurse practitioner, Guille Diaz in 2 weeks. Cardiac Catheterization  Discharge Instructions    Transradial Catheterization Discharge Instructions (WRIST)    Discharge instructions: Your radial artery in your wrist was used for your cardiac catheterization. This site may be slightly bruised and sore following your procedure. Expect mild tingling or the hand and tenderness at the puncture site for up to 3 days. Excess movement of the wrist used should be avoided for the next 24-48 hours. 1. No lifting over 2 pounds (approximately a ½ gallon of milk) with this arm for 24 hours. 2. Keep the site of the procedure covered with a bandage for 24 hours. 3. You may shower the day after your procedure. Do not take a tub bath or submerge the puncture site in water for 48 hours. 4. No heavy impact activity/lifting > 30 pounds for 1 week.      If bleeding of the wrist occurs at home:   If the site on your wrist where you had the catheterization procedure begins to bleed, do not panic. 1. Place 1 or 2 fingers over the puncture site and hold pressure to stop the bleeding. You may be able to feel your pulse as you hold pressure. 2. Lift your fingers after 5 minutes to see if the bleeding has stopped. 3. Once the bleeding has stopped, gently wipe the wrist area clean and cover with a bandage. If the bleeding from your wrist does not stop after 15 minutes, or if there is a large amount of bleeding or spurting, call 911 immediately (do not drive yourself to the hospital). Other concerns: The site may be slightly bruised and sore following your procedure. Should any of the following occur, contact your physician immediately:   1. Any cool or coldness of the arm, discoloration over a large area, ongoing numbness or any abnormal sensations , moderate to severe pain or swelling in the arm. 2. Redness, soreness, swelling, chills or fever, or colored drainage at the procedure site within 3-7 days after your procedure. If you have any further questions or concerns regarding your procedure please call the Cardiac Cath Lab office at [phone]. During regular business hours ask to speak to Dr. Joseph Stephens. During non-business hours the answering service will answer. Ask to speak to the physician on call for [facility].     Transfemoral Catheterization Discharge Instructions Mio Bennett)     Do not drive, operate any machinery, or sign any legal documents for 24 hours after your procedure. You must have someone to drive you home.  You may take a shower 24 hours after your cardiac catheterization. Be sure to get the dressing wet and then remove it; gently wash the area with warm soapy water. Pat dry and leave open to air. To help prevent infections, be sure to keep the cath site clean and dry. No lotions, creams, powders, ointments, etc. in the cath site for approximately 1 week.      Do not take a tub bath, get in a hot tub or swimming pool for approximately 5 days or until the cath site is completely healed.  No strenuous activity or heavy lifting over 10 lbs. for 7 days.  Drink plenty of fluids for 24-48 hours after your cath to flush the contrast dye from your kidneys. No alcoholic beverages for 24 hours. You may resume your previous diet (low fat, low cholesterol) after your cath.  After your cath, some bruising or discomfort is common during the healing process. Tylenol, 1-2 tablets every 6 hours as needed, is recommended if you experience any discomfort. If you experience any signs or symptoms of infection such as fever, chills, or poorly healing incision, persistent tenderness or swelling in the groin, redness and/or warmth to the touch, numbness, significant tingling or pain at the groin site or affected extremity, rash, drainage from the cath site, or if the leg feels tight or swollen, call your physician right away.  If bleeding at the cath site occurs, take a clean gauze pad and apply direct pressure to the groin just above the puncture site. Call 911 immediately, and continue to apply direct pressure until an ambulance gets to your location.  You may return to work  2  days after your cardiac cath if no groin bleeding. Information obtained by :  I understand that if any problems occur once I am at home I am to contact my physician. I understand and acknowledge receipt of the instructions indicated above.                                                                                                                                            R.N.'s Signature                                                                  Date/Time                                                                                                                                              Patient or Representative Signature                                                          Date/Time      Rafael Olsen Joy III, DO             [address] [facility], [address] [facility] [place]    [phone]  [place], [address]    [email or web address]

## 2020-01-29 NOTE — PROGRESS NOTES
Mr Betsy Banks when offered his nebulizer treatment with Brovana and Pulmicort he said he does not take nebulizer treatments and does not want to take the medication. He says he does an inhaler sometimes at home but not all the time. So he refusing this medication and mode of medication given.

## 2020-02-03 ENCOUNTER — TELEPHONE (OUTPATIENT)
Dept: CARDIAC REHAB | Age: 85
End: 2020-02-03

## 2020-03-02 ENCOUNTER — OFFICE VISIT (OUTPATIENT)
Dept: FAMILY MEDICINE CLINIC | Age: 85
End: 2020-03-02

## 2020-03-02 VITALS
BODY MASS INDEX: 37.94 KG/M2 | DIASTOLIC BLOOD PRESSURE: 70 MMHG | OXYGEN SATURATION: 97 % | RESPIRATION RATE: 16 BRPM | HEART RATE: 75 BPM | TEMPERATURE: 97.2 F | SYSTOLIC BLOOD PRESSURE: 120 MMHG | WEIGHT: 265 LBS | HEIGHT: 70 IN

## 2020-03-02 DIAGNOSIS — I25.10 CORONARY ARTERY DISEASE DUE TO LIPID RICH PLAQUE: ICD-10-CM

## 2020-03-02 DIAGNOSIS — E66.01 OBESITY, MORBID (HCC): ICD-10-CM

## 2020-03-02 DIAGNOSIS — I10 ESSENTIAL HYPERTENSION: ICD-10-CM

## 2020-03-02 DIAGNOSIS — Z79.899 ENCOUNTER FOR LONG-TERM (CURRENT) USE OF MEDICATIONS: ICD-10-CM

## 2020-03-02 DIAGNOSIS — Z79.4 TYPE 2 DIABETES MELLITUS WITHOUT COMPLICATION, WITH LONG-TERM CURRENT USE OF INSULIN (HCC): Primary | ICD-10-CM

## 2020-03-02 DIAGNOSIS — J84.9 CHRONIC INTERSTITIAL LUNG DISEASE (HCC): ICD-10-CM

## 2020-03-02 DIAGNOSIS — I25.83 CORONARY ARTERY DISEASE DUE TO LIPID RICH PLAQUE: ICD-10-CM

## 2020-03-02 DIAGNOSIS — Z86.718 HISTORY OF VENOUS THROMBOEMBOLISM: ICD-10-CM

## 2020-03-02 DIAGNOSIS — E11.9 TYPE 2 DIABETES MELLITUS WITHOUT COMPLICATION, WITH LONG-TERM CURRENT USE OF INSULIN (HCC): Primary | ICD-10-CM

## 2020-03-02 DIAGNOSIS — E78.5 HYPERLIPIDEMIA LDL GOAL <100: ICD-10-CM

## 2020-03-02 DIAGNOSIS — Z95.820 S/P ANGIOPLASTY WITH STENT: ICD-10-CM

## 2020-03-02 RX ORDER — PEN NEEDLE, DIABETIC 31 GX3/16"
NEEDLE, DISPOSABLE MISCELLANEOUS
Qty: 300 PEN NEEDLE | Refills: 3 | Status: SHIPPED | OUTPATIENT
Start: 2020-03-02 | End: 2021-03-10

## 2020-03-02 NOTE — PROGRESS NOTES
Chief Complaint   Patient presents with    Hypertension     6 month follow-up   1. Have you been to the ER, urgent care clinic since your last visit? Hospitalized since your last visit? Yes Where: [facility] Stent placement    2. Have you seen or consulted any other health care providers outside of the [address] [place] [address] since your last visit? Include any pap smears or colon screening.  No   No concerns voiced

## 2020-03-02 NOTE — PROGRESS NOTES
Subjective:     Chief Complaint   Patient presents with    Hypertension     6 month follow-up        He  is a 80y.o. year old male who presents for evaluation of:  Doing well today. Since last appt, admitted from 1/28/2020-1/29/2020 for a cath and ended up getting 1 ZACKERY of LAD with Dr. Zeinab Alfaro. Has not started with Cardiac Rehab yet. ILD - Ct working with Dr. Haven Ruiz on this. Largely thought to be related to work exposure over the years. 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 See anti-coag tab for history and dosing with plan. Has done well with home INR checks. Diabetes Mellitus:  Taking meds  Was following with Dr. Sandrine Avila but has not seen since 8/2019. Not exercising  Compliant with diabetic diet. Avoids sodas and sweet teas. Avoids fast food. Limits carbs. Pt is a non smoker. Lab Results   Component Value Date/Time    Hemoglobin A1c (POC) 5.8 08/28/2019 10:19 AM    Hemoglobin A1c (POC) 5.8 05/01/2019 12:00 PM    Hemoglobin A1c 6.2 (H) 01/28/2020 01:16 PM    Microalb/Creat ratio (ug/mg creat.) <5.5 08/28/2019 10:56 AM    LDL, calculated 125 (H) 08/28/2019 10:56 AM    Creatinine 1.21 01/29/2020 03:49 AM      Lab Results   Component Value Date/Time    GFR est AA >60 01/29/2020 03:49 AM    GFR est non-AA 57 (L) 01/29/2020 03:49 AM      Lab Results   Component Value Date/Time    TSH 3.430 08/28/2019 10:56 AM       ROS  Gen - no fever/chills  Resp - per HPI  CV - no chest pain.   Rest per HPI    Objective:     Vitals:    03/02/20 0951   BP: 120/70   Pulse: 75   Resp: 16   Temp: 97.2 °F (36.2 °C)   TempSrc: Oral   SpO2: 97%   Weight: 265 lb (120.2 kg)   Height: 5' 10\" (1.778 m)     Physical Examination:   General appearance - alert, well appearing, and in no distress  Eyes -sclera anicteric  Neck - supple, no significant adenopathy, no thyromegaly  Chest - clear to auscultation, no wheezes, rales or rhonchi, symmetric air entry  Heart - normal rate, regular rhythm, normal S1, S2, no murmurs, rubs, clicks or gallops  Neurological - alert, oriented, normal speech, no focal findings or movement disorder noted  Extr - trace bilat edema  Psych - normal mood and affect    Allergies   Allergen Reactions    Statins-Hmg-Coa Reductase Inhibitors Myalgia     Tried several statins. Social History     Socioeconomic History    Marital status:      Spouse name: Not on file    Number of children: Not on file    Years of education: Not on file    Highest education level: Not on file   Tobacco Use    Smoking status: Former Smoker     Packs/day: 1.00     Last attempt to quit: 1975     Years since quittin.1    Smokeless tobacco: Never Used   Substance and Sexual Activity    Alcohol use: Yes     Comment: once in a while    Drug use: No    Sexual activity: Yes     Partners: Female      Family History   Problem Relation Age of Onset    Heart Disease Brother     Bleeding Prob Father     Tuberculosis Sister       Past Surgical History:   Procedure Laterality Date    HX ORTHOPAEDIC Left     Crushed/left index finger amputee    HX ORTHOPAEDIC      heal spur removed left foot    HX OTHER SURGICAL      hand surgery, heal spur L    UPPER GI ENDOSCOPY,BIOPSY  2018         UPPER GI ENDOSCOPY,DILATN W GUIDE  2018           Past Medical History:   Diagnosis Date    Arthritis     Back injury     CAD (coronary artery disease)     Stent replacement    Diabetes ([facility] [address]     GERD (gastroesophageal reflux disease)     Hypertension     Muscle cramps     PUD (peptic ulcer disease)     Reflux     Thromboembolus (HCC)     L BLE      Current Outpatient Medications   Medication Sig Dispense Refill    Insulin Needles, Disposable, (ALTAGRACIA PEN NEEDLE) 32 gauge x 5/32\" ndle USE 1 NEEDLE UNDER THE SKIN DAILY. Diagnosis E 11.65 300 Pen Needle 3    clopidogreL (PLAVIX) 75 mg tab Take 1 Tab by mouth daily.  30 Tab 11    rivaroxaban (XARELTO) 15 mg tab tablet Take 1 Tab by mouth daily. 30 Tab 0    dapagliflozin (FARXIGA) 5 mg tab tablet Take 1 Tab by mouth daily. 90 Tab 3    finasteride (PROSCAR) 5 mg tablet TAKE ONE TABLET DAILY 90 Tab 3    lansoprazole (PREVACID) 30 mg capsule TAKE 1 CAPSULE DAILY BEFORE BREAKFAST 90 Cap 3    insulin glargine (LANTUS SOLOSTAR U-100 INSULIN) 100 unit/mL (3 mL) inpn Indications: PATIENT TAKING 45 UNITS  Indications: PATIENT TAKING 45 UNITS 60 mL 3    metFORMIN (GLUCOPHAGE) 1,000 mg tablet TAKE 1 TABLET TWICE A  Tab 3    metoprolol succinate (TOPROL-XL) 25 mg XL tablet Take 1 Tab by mouth daily. 90 Tab 3    semaglutide (OZEMPIC) 0.25 mg/0.2 mL (2 mg/1.5 mL) sub-q pen INJECT 0.5 MG UNDER THE SKIN EVERY 7 DAYS (REPLACES BYDUREON. FOR DIABETES) 4.5 mL 3    telmisartan (MICARDIS) 80 mg tablet Take 1 Tab by mouth daily. 90 Tab 3    SYMBICORT 160-4.5 mcg/actuation HFAA Take 2 Puffs by inhalation two (2) times a day.  magnesium oxide (MAG-OX) 400 mg tablet Take 800 mg by mouth two (2) times a day.  diclofenac (VOLTAREN) 1 % gel Apply  to affected area four (4) times daily. 100 g 5    cholecalciferol, VITAMIN D3, (VITAMIN D3) 5,000 unit tab tablet Take 1,000 Units by mouth daily. Nature Made       cyanocobalamin (VITAMIN B-12) 1,000 mcg tablet Take 1,000 mcg by mouth daily. Nature Made       cyclobenzaprine (FLEXERIL) 10 mg tablet Take 10 mg by mouth three (3) times daily as needed for Muscle Spasm(s).  nitroglycerin (NITROSTAT) 0.4 mg SL tablet place 1 tablet under the tongue AT FIRST SIGN OF ATTACK, MAY REPE. ..  (REFER TO PRESCRIPTION NOTES). 1    aspirin 81 mg chewable tablet Take 1 Tab by mouth daily. 90 Tab 1        Assessment/ Plan:   Diagnoses and all orders for this visit:    1. Type 2 diabetes mellitus without complication, with long-term current use of insulin (HCC) - controlled  -     Insulin Needles, Disposable, (ALTAGRACIA PEN NEEDLE) 32 gauge x 5/32\" ndle; USE 1 NEEDLE UNDER THE SKIN DAILY. Diagnosis E 11.65    2. Essential hypertension - controlled    3. Coronary artery disease due to lipid rich plaque  4. S/P angioplasty with stent  5. Hyperlipidemia LDL goal <100  - doing well with recent stent following with Cardiology. Statin intolerant. Would consider PCSK9i    6. Chronic interstitial lung disease (HCC)working with pulmonology    7. History of venous thromboembolism - doing well with Xarelto now and no longer in need of home INR checks. 9. Obesity, morbid ([address]  Discussed the patient's BMI. The BMI follow up plan is as follows:   dietary management education, guidance, and counseling  encourage exercise  monitor weight  prescribed dietary intake    I have discussed the diagnosis with the patient and the intended plan as seen in the above orders. The patient has received an after-visit summary and questions were answered concerning future plans. I have discussed medication side effects and warnings with the patient as well. The patient verbalizes understanding and agreement with the plan. Follow-up and Dispositions    · Return in about 3 months (around 6/2/2020), or if symptoms worsen or fail to improve.

## 2020-03-12 ENCOUNTER — APPOINTMENT (OUTPATIENT)
Dept: CARDIAC REHAB | Age: 85
End: 2020-03-12

## 2020-04-28 DIAGNOSIS — Z79.899 ENCOUNTER FOR LONG-TERM (CURRENT) USE OF MEDICATIONS: ICD-10-CM

## 2020-04-28 DIAGNOSIS — I10 ESSENTIAL HYPERTENSION: ICD-10-CM

## 2020-04-28 DIAGNOSIS — E78.5 HYPERLIPIDEMIA LDL GOAL <100: ICD-10-CM

## 2020-04-28 DIAGNOSIS — Z79.4 TYPE 2 DIABETES MELLITUS WITHOUT COMPLICATION, WITH LONG-TERM CURRENT USE OF INSULIN (HCC): ICD-10-CM

## 2020-04-28 DIAGNOSIS — E11.9 TYPE 2 DIABETES MELLITUS WITHOUT COMPLICATION, WITH LONG-TERM CURRENT USE OF INSULIN (HCC): ICD-10-CM

## 2020-04-28 DIAGNOSIS — I25.10 CORONARY ARTERY DISEASE DUE TO LIPID RICH PLAQUE: ICD-10-CM

## 2020-04-28 DIAGNOSIS — E66.01 OBESITY, MORBID (HCC): ICD-10-CM

## 2020-04-28 DIAGNOSIS — I25.83 CORONARY ARTERY DISEASE DUE TO LIPID RICH PLAQUE: ICD-10-CM

## 2020-04-28 DIAGNOSIS — Z95.820 S/P ANGIOPLASTY WITH STENT: ICD-10-CM

## 2020-06-23 ENCOUNTER — VIRTUAL VISIT (OUTPATIENT)
Dept: FAMILY MEDICINE CLINIC | Age: 85
End: 2020-06-23

## 2020-06-23 DIAGNOSIS — E78.5 HYPERLIPIDEMIA LDL GOAL <100: ICD-10-CM

## 2020-06-23 DIAGNOSIS — J84.9 CHRONIC INTERSTITIAL LUNG DISEASE (HCC): ICD-10-CM

## 2020-06-23 DIAGNOSIS — Z79.899 ENCOUNTER FOR LONG-TERM (CURRENT) USE OF MEDICATIONS: ICD-10-CM

## 2020-06-23 DIAGNOSIS — I25.83 CORONARY ARTERY DISEASE DUE TO LIPID RICH PLAQUE: ICD-10-CM

## 2020-06-23 DIAGNOSIS — I10 ESSENTIAL HYPERTENSION: ICD-10-CM

## 2020-06-23 DIAGNOSIS — E66.01 OBESITY, MORBID (HCC): ICD-10-CM

## 2020-06-23 DIAGNOSIS — Z00.00 MEDICARE ANNUAL WELLNESS VISIT, SUBSEQUENT: Primary | ICD-10-CM

## 2020-06-23 DIAGNOSIS — I25.10 CORONARY ARTERY DISEASE DUE TO LIPID RICH PLAQUE: ICD-10-CM

## 2020-06-23 DIAGNOSIS — Z86.718 HISTORY OF VENOUS THROMBOEMBOLISM: ICD-10-CM

## 2020-06-23 DIAGNOSIS — Z71.89 ADVANCED DIRECTIVES, COUNSELING/DISCUSSION: ICD-10-CM

## 2020-06-23 DIAGNOSIS — E11.9 TYPE 2 DIABETES MELLITUS WITHOUT COMPLICATION, WITH LONG-TERM CURRENT USE OF INSULIN (HCC): ICD-10-CM

## 2020-06-23 DIAGNOSIS — Z95.820 S/P ANGIOPLASTY WITH STENT: ICD-10-CM

## 2020-06-23 DIAGNOSIS — Z79.4 TYPE 2 DIABETES MELLITUS WITHOUT COMPLICATION, WITH LONG-TERM CURRENT USE OF INSULIN (HCC): ICD-10-CM

## 2020-06-23 RX ORDER — ASCORBIC ACID 500 MG
TABLET ORAL
COMMUNITY
End: 2021-07-12

## 2020-06-23 RX ORDER — SEMAGLUTIDE 1.34 MG/ML
0.75 INJECTION, SOLUTION SUBCUTANEOUS
Qty: 5 PEN | Refills: 0 | Status: SHIPPED | OUTPATIENT
Start: 2020-06-23 | End: 2020-07-08 | Stop reason: DRUGHIGH

## 2020-06-23 NOTE — PATIENT INSTRUCTIONS
Medicare Wellness Visit, Male The best way to live healthy is to have a lifestyle where you eat a well-balanced diet, exercise regularly, limit alcohol use, and quit all forms of tobacco/nicotine, if applicable. Regular preventive services are another way to keep healthy. Preventive services (vaccines, screening tests, monitoring & exams) can help personalize your care plan, which helps you manage your own care. Screening tests can find health problems at the earliest stages, when they are easiest to treat. [facility] follows the current, evidence-based guidelines published by the [facility] ([facility]) when recommending preventive services for our patients. Because we follow these guidelines, sometimes recommendations change over time as research supports it. (For example, a prostate screening blood test is no longer routinely recommended for men with no symptoms). Of course, you and your doctor may decide to screen more often for some diseases, based on your risk and co-morbidities (chronic disease you are already diagnosed with). Preventive services for you include: - Medicare offers their members a free annual wellness visit, which is time for you and your primary care provider to discuss and plan for your preventive service needs. Take advantage of this benefit every year! 
-All adults over age 72 should receive the recommended pneumonia vaccines. Current USPSTF guidelines recommend a series of two vaccines for the best pneumonia protection.  
-All adults should have a flu vaccine yearly and tetanus vaccine every 10 years. 
-All adults age 48 and older should receive the shingles vaccines (series of two vaccines).       
-All adults age 38-68 who are overweight should have a diabetes screening test once every three years.  
-Other screening tests & preventive services for persons with diabetes include: an eye exam to screen for diabetic retinopathy, a kidney function test, a foot exam, and stricter control over your cholesterol.  
-Cardiovascular screening for adults with routine risk involves an electrocardiogram (ECG) at intervals determined by the provider.  
-Colorectal cancer screening should be done for adults age 54-65 with no increased risk factors for colorectal cancer. There are a number of acceptable methods of screening for this type of cancer. Each test has its own benefits and drawbacks. Discuss with your provider what is most appropriate for you during your annual wellness visit. The different tests include: colonoscopy (considered the best screening method), a fecal occult blood test, a fecal DNA test, and sigmoidoscopy. 
-All adults born between [facility] should be screened once for Hepatitis C. 
-An Abdominal Aortic Aneurysm (AAA) Screening is recommended for men age 73-68 who has ever smoked in their lifetime. Here is a list of your current Health Maintenance items (your personalized list of preventive services) with a due date: 
Health Maintenance Due Topic Date Due  Shingles Vaccine (1 of 2) 08/15/1984  Pneumococcal Vaccine (1 of 1 - PPSV23) 08/15/1999 Joya Her Annual Well Visit  05/30/2020

## 2020-06-23 NOTE — PROGRESS NOTES
This is the Subsequent Medicare Annual Wellness Exam, performed 12 months or more after the Initial AWV or the last Subsequent AWV    Consent: Kareen Rooney, who was seen by synchronous (real-time) audio-video technology, and/or his healthcare decision maker, is aware that this patient-initiated, Telehealth encounter on 6/23/2020 is a billable service. While AWVs are fully covered by Medicare, any services rendered on this date that are not included in an AWV are subject to additional billing, with coverage as determined by his insurance carrier. He is aware that he may receive a bill for any such additional services and has provided verbal consent to proceed: Yes. I have reviewed the patient's medical history in detail and updated the computerized patient record.      History     Patient Active Problem List   Diagnosis Code    Diabetes mellitus, type II ([address] E11.9    Gastroesophageal reflux disease K21.9    History of venous thromboembolism Z86.718    Encounter for monitoring Coumadin therapy Z51.81, Z79.01    Essential hypertension I10    Obesity, morbid ([address] E66.01    Type 2 diabetes with nephropathy ([address] E11.21    NSTEMI (non-ST elevated myocardial infarction) ([address] I21.4     Past Medical History:   Diagnosis Date    Arthritis     Back injury 1994    CAD (coronary artery disease)     Stent replacement    Diabetes ([address]     GERD (gastroesophageal reflux disease)     Hypertension     Muscle cramps     PUD (peptic ulcer disease)     Reflux     Thromboembolus ([address]     L BLE      Past Surgical History:   Procedure Laterality Date    HX ORTHOPAEDIC Left     Crushed/left index finger amputee    HX ORTHOPAEDIC      heal spur removed left foot    HX OTHER SURGICAL      hand surgery, heal spur L    UPPER GI ENDOSCOPY,BIOPSY  5/16/2018         UPPER GI ENDOSCOPY,DILATN W GUIDE  5/16/2018          Current Outpatient Medications   Medication Sig Dispense Refill    ascorbic acid, vitamin C, (Vitamin C) 500 mg tablet Take  by mouth.  semaglutide (Ozempic) 0.25 mg/0.2 mL (2 mg/1.5 mL) sub-q pen 0.75 mg by SubCUTAneous route every seven (7) days for 90 days. 5 Pen 0    Insulin Needles, Disposable, (ALTAGRACIA PEN NEEDLE) 32 gauge x 5/32\" ndle USE 1 NEEDLE UNDER THE SKIN DAILY. Diagnosis E 11.65 300 Pen Needle 3    clopidogreL (PLAVIX) 75 mg tab Take 1 Tab by mouth daily. 30 Tab 11    rivaroxaban (XARELTO) 15 mg tab tablet Take 1 Tab by mouth daily. (Patient taking differently: Take 20 mg by mouth daily.) 30 Tab 0    dapagliflozin (FARXIGA) 5 mg tab tablet Take 1 Tab by mouth daily. 90 Tab 3    finasteride (PROSCAR) 5 mg tablet TAKE ONE TABLET DAILY 90 Tab 3    lansoprazole (PREVACID) 30 mg capsule TAKE 1 CAPSULE DAILY BEFORE BREAKFAST 90 Cap 3    insulin glargine (LANTUS SOLOSTAR U-100 INSULIN) 100 unit/mL (3 mL) inpn Indications: PATIENT TAKING 45 UNITS  Indications: PATIENT TAKING 45 UNITS (Patient taking differently: Indications: PATIENT TAKING 50 UNITS) 60 mL 3    metFORMIN (GLUCOPHAGE) 1,000 mg tablet TAKE 1 TABLET TWICE A  Tab 3    metoprolol succinate (TOPROL-XL) 25 mg XL tablet Take 1 Tab by mouth daily. 90 Tab 3    telmisartan (MICARDIS) 80 mg tablet Take 1 Tab by mouth daily. 90 Tab 3    SYMBICORT 160-4.5 mcg/actuation HFAA Take 2 Puffs by inhalation two (2) times a day.  magnesium oxide (MAG-OX) 400 mg tablet Take 800 mg by mouth two (2) times a day.  diclofenac (VOLTAREN) 1 % gel Apply  to affected area four (4) times daily. 100 g 5    cholecalciferol, VITAMIN D3, (VITAMIN D3) 5,000 unit tab tablet Take 1,000 Units by mouth daily. Nature Made       cyanocobalamin (VITAMIN B-12) 1,000 mcg tablet Take 1,000 mcg by mouth daily. Nature Made       cyclobenzaprine (FLEXERIL) 10 mg tablet Take 10 mg by mouth three (3) times daily as needed for Muscle Spasm(s).       nitroglycerin (NITROSTAT) 0.4 mg SL tablet place 1 tablet under the tongue AT FIRST SIGN OF ATTACK, MAY JOSE Haji .  (REFER TO PRESCRIPTION NOTES). 1    aspirin 81 mg chewable tablet Take 1 Tab by mouth daily. 90 Tab 1     Allergies   Allergen Reactions    Statins-Hmg-Coa Reductase Inhibitors Myalgia     Tried several statins. Family History   Problem Relation Age of Onset    Heart Disease Brother     Bleeding Prob Father     Tuberculosis Sister      Social History     Tobacco Use    Smoking status: Former Smoker     Packs/day: 1.00     Last attempt to quit: 1975     Years since quittin.5    Smokeless tobacco: Never Used   Substance Use Topics    Alcohol use: Yes     Comment: once in a while       Depression Risk Factor Screening:     3 most recent PHQ Screens 3/2/2020   Little interest or pleasure in doing things Not at all   Feeling down, depressed, irritable, or hopeless Not at all   Total Score PHQ 2 0       Alcohol Risk Factor Screening (MALE > 65): Do you average more 1 drink per night or more than 7 drinks a week: No    In the past three months have you have had more than 4 drinks containing alcohol on one occasion: No      Functional Ability and Level of Safety:   Hearing: Hearing is good. Activities of Daily Living: The home contains: no safety equipment. Patient does total self care     Ambulation: with no difficulty     Fall Risk:  Fall Risk Assessment, last 12 mths 3/2/2020   Able to walk? Yes   Fall in past 12 months? Yes   Fall with injury?  No   Number of falls in past 12 months 1   Fall Risk Score 1     Abuse Screen:  Patient is not abused       Cognitive Screening   Has your family/caregiver stated any concerns about your memory: no    Cognitive Screening: Normal - Verbal Fluency Test    Patient Care Team   Patient Care Team:  Ofelia Cabrera MD as PCP - General (Family Practice)  Ofelia Cabrera MD as PCP - [facility] Empaneled Provider  Chad Muir MD (Ophthalmology)  Traci Chaney MD as Consulting Provider (Endocrinology)  Brayden Machado MD (Orthopedic Surgery)  Jose G Hutchins MD (Otolaryngology)    Assessment/Plan   Education and counseling provided:  Are appropriate based on today's review and evaluation    Diagnoses and all orders for this visit:    1. Medicare annual wellness visit, subsequent  2. Advanced directives, counseling/discussion  -     ADVANCE CARE PLANNING FIRST 30 MINS    3. Type 2 diabetes mellitus without complication, with long-term current use of insulin (HCC) - controlled, will check labs, incr Ozempic  -     HEMOGLOBIN A1C WITH EAG  -     LIPID PANEL  -     METABOLIC PANEL, COMPREHENSIVE  -     TSH 3RD GENERATION  -     URINALYSIS W/ RFLX MICROSCOPIC  -     semaglutide (Ozempic) 0.25 mg/0.2 mL (2 mg/1.5 mL) sub-q pen; 0.75 mg by SubCUTAneous route every seven (7) days for 90 days. 4. Essential hypertension - controlled  -     METABOLIC PANEL, COMPREHENSIVE  -     URINALYSIS W/ RFLX MICROSCOPIC    5. Coronary artery disease due to lipid rich plaque  6. S/P angioplasty with stent  7. Hyperlipidemia LDL goal <100  - stable, following with cardiology  -     HEMOGLOBIN A1C WITH EAG  -     LIPID PANEL  -     METABOLIC PANEL, COMPREHENSIVE  -     TSH 3RD GENERATION    8. Chronic interstitial lung disease (HCC) - stable, following with Pulm. Discussed COVID risks. 9. History of venous thromboembolism    10. Obesity, morbid ([address] - encouraged weight loss  -     HEMOGLOBIN A1C WITH EAG  -     LIPID PANEL  -     TSH 3RD GENERATION    11.  Encounter for long-term (current) use of medications  -     HEMOGLOBIN A1C WITH EAG  -     LIPID PANEL  -     METABOLIC PANEL, COMPREHENSIVE  -     TSH 3RD GENERATION  -     CBC W/O DIFF  -     URINALYSIS W/ RFLX MICROSCOPIC  -     PSA, DIAGNOSTIC (PROSTATE SPECIFIC AG)      Health Maintenance Due   Topic Date Due    Shingrix Vaccine Age 49> (1 of 2) 08/15/1984    Pneumococcal 65+ years (1 of 1 - PPSV23) 08/15/1999    Medicare Yearly Exam  05/30/2020       Hao Sparrow is a 80 y.o. male who was evaluated by an audio-video encounter for concerns as above. Patient identification was verified prior to start of the visit. A caregiver was present when appropriate. Due to this being a TeleHealth encounter (During JBNLG-74 public health emergency), evaluation of the following organ systems was limited: Vitals/Constitutional/EENT/Resp/CV/GI//MS/Neuro/Skin/Heme-Lymph-Imm. Pursuant to the emergency declaration under the [address] [place] [address], [facility] waiver authority and the [employer] Act, this Virtual Visit was conducted, with patient's (and/or legal guardian's) consent, to reduce the patient's risk of exposure to COVID-19 and provide necessary medical care. Services were provided through a synchronous discussion virtually to substitute for in-person clinic visit. I was in the office. The patient was at home.       Roselyn Goldberg MD

## 2020-06-23 NOTE — ACP (ADVANCE CARE PLANNING)
Advance Care Planning       Advance Care Planning (ACP) Physician/NP/PA (Provider) Conversation        Date of ACP Conversation: 6/23/2020    Conversation Conducted with:   Patient with Decision Making [address]  [facility] Decision Maker: Proxy is pt's wife, Ann-Marie Garcia Preferences:  Pt has Adv directive on file and reviewed together with no changes. We also discussed concerns specific to COVID 19 risk given his age and comorbidities. - Pt only wants care if reasonable chance of complications. He would never want to be discharged to a nursing home and prefers to die at home if this is an option. Hospitalization: \"If your health worsens and it becomes clear that your chance of recovery is unlikely, what would your preference be regarding hospitalization? \"  If the patient would want hospitalization, answer \"yes\". If the patient would prefer comfort-focused treatment without hospitalization, answer \"no\". no      Ventilation: \"If you were in your present state of health and suddenly became very ill and were unable to breathe on your own, what would your preference be about the use of a ventilator (breathing machine) if it was available to you? \"    If patient would desire the use of a ventilator (breathing machine), answer \"yes\", if not answer \"no\":yes, unless related to COVID 19    \"If your health worsens and it becomes clear that your chance of recovery is unlikely, what would your preference be about the use of a ventilator (breathing machine) if it was available to you? \"   no      Resuscitation:  \"CPR works best to restart the heart when there is a sudden event, like a heart attack, in someone who is otherwise healthy. Unfortunately, CPR does not typically restart the heart for people who have serious health conditions or who are very sick. \"    \"In the event your heart stopped as a result of an underlying serious health condition, would you want attempts to be made to restart your heart (answer \"yes\" for attempt to resuscitate) or would you prefer a natural death (answer \"no\" for do not attempt to resuscitate)? \"   no      Conversation Outcomes / Follow-Up Plan: Update Advanced Directive    Length of Voluntary ACP Conversation in minutes:  16 minutes      Rhett Vargas MD

## 2020-07-01 LAB
ALBUMIN SERPL-MCNC: 4.3 G/DL (ref 3.6–4.6)
ALBUMIN/GLOB SERPL: 1.5 {RATIO} (ref 1.2–2.2)
ALP SERPL-CCNC: 77 IU/L (ref 39–117)
ALT SERPL-CCNC: 14 IU/L (ref 0–44)
APPEARANCE UR: CLEAR
AST SERPL-CCNC: 33 IU/L (ref 0–40)
BILIRUB SERPL-MCNC: 0.5 MG/DL (ref 0–1.2)
BILIRUB UR QL STRIP: NEGATIVE
BUN SERPL-MCNC: 17 MG/DL (ref 8–27)
BUN/CREAT SERPL: 15 (ref 10–24)
CALCIUM SERPL-MCNC: 9.8 MG/DL (ref 8.6–10.2)
CHLORIDE SERPL-SCNC: 97 MMOL/L (ref 96–106)
CHOLEST SERPL-MCNC: 200 MG/DL (ref 100–199)
CO2 SERPL-SCNC: 20 MMOL/L (ref 20–29)
COLOR UR: YELLOW
CREAT SERPL-MCNC: 1.14 MG/DL (ref 0.76–1.27)
ERYTHROCYTE [DISTWIDTH] IN BLOOD BY AUTOMATED COUNT: 13.7 % (ref 11.6–15.4)
EST. AVERAGE GLUCOSE BLD GHB EST-MCNC: 123 MG/DL
GLOBULIN SER CALC-MCNC: 2.8 G/DL (ref 1.5–4.5)
GLUCOSE SERPL-MCNC: 172 MG/DL (ref 65–99)
GLUCOSE UR QL: ABNORMAL
HBA1C MFR BLD: 5.9 % (ref 4.8–5.6)
HCT VFR BLD AUTO: 42.8 % (ref 37.5–51)
HDLC SERPL-MCNC: 41 MG/DL
HGB BLD-MCNC: 14.5 G/DL (ref 13–17.7)
HGB UR QL STRIP: NEGATIVE
INTERPRETATION: NORMAL
KETONES UR QL STRIP: NEGATIVE
LDLC SERPL CALC-MCNC: 122 MG/DL (ref 0–99)
LEUKOCYTE ESTERASE UR QL STRIP: NEGATIVE
Lab: NORMAL
MCH RBC QN AUTO: 31.6 PG (ref 26.6–33)
MCHC RBC AUTO-ENTMCNC: 33.9 G/DL (ref 31.5–35.7)
MCV RBC AUTO: 93 FL (ref 79–97)
MICRO URNS: ABNORMAL
NITRITE UR QL STRIP: NEGATIVE
PH UR STRIP: 5.5 [PH] (ref 5–7.5)
PLATELET # BLD AUTO: 335 X10E3/UL (ref 150–450)
POTASSIUM SERPL-SCNC: 4.7 MMOL/L (ref 3.5–5.2)
PROT SERPL-MCNC: 7.1 G/DL (ref 6–8.5)
PROT UR QL STRIP: NEGATIVE
PSA SERPL-MCNC: 0.5 NG/ML (ref 0–4)
RBC # BLD AUTO: 4.59 X10E6/UL (ref 4.14–5.8)
SODIUM SERPL-SCNC: 134 MMOL/L (ref 134–144)
SP GR UR: 1.02 (ref 1–1.03)
TRIGL SERPL-MCNC: 183 MG/DL (ref 0–149)
TSH SERPL DL<=0.005 MIU/L-ACNC: 2.79 UIU/ML (ref 0.45–4.5)
UROBILINOGEN UR STRIP-MCNC: 0.2 MG/DL (ref 0.2–1)
VLDLC SERPL CALC-MCNC: 37 MG/DL (ref 5–40)
WBC # BLD AUTO: 6.2 X10E3/UL (ref 3.4–10.8)

## 2020-07-07 NOTE — TELEPHONE ENCOUNTER
----- Message from Bennett Guy sent at 7/7/2020 11:18 AM EDT -----  Regarding: Dr. Cassidy Coello: [phone]  Caller (if not patient): Celestina Vogel  Relationship of caller (if not patient):  Wife  Best contact number(s): [phone]  Name of medication and dosage if known: \"ozimpia\" diabetic injection   Is patient out of this medication (yes/no): Yes  Pharmacy name: [address] listed in chart? (yes/no): N/A  Pharmacy phone number: N/A  Date of last visit: 6/23/2020  Details to clarify the request: Wants the nurse to give them a call, says that it's an emergency and that they have not heard from anyone since last week

## 2020-07-07 NOTE — TELEPHONE ENCOUNTER
Spoke with patient and advised [facility] last week and clarified Ozempic. Advised Dr Margaret Bell will resend a new prescription to pharmacy to increase dosage to 1 mg.

## 2020-07-21 NOTE — TELEPHONE ENCOUNTER
----- Message from Bob Carey sent at 7/21/2020  9:08 AM EDT -----  Regarding: Dr. Talib Lawler  Patient's wife states [facility] need the direction for the Ozempic Medication before it can be filled, she would like to speak with the nurse concerning instructions before she contact the pharmacy, pls contact pt at [phone].

## 2020-12-09 ENCOUNTER — VIRTUAL VISIT (OUTPATIENT)
Dept: FAMILY MEDICINE CLINIC | Age: 85
End: 2020-12-09
Payer: MEDICARE

## 2020-12-09 DIAGNOSIS — K21.9 GASTROESOPHAGEAL REFLUX DISEASE: ICD-10-CM

## 2020-12-09 DIAGNOSIS — I10 ESSENTIAL HYPERTENSION: ICD-10-CM

## 2020-12-09 DIAGNOSIS — N40.0 BENIGN PROSTATIC HYPERPLASIA, UNSPECIFIED WHETHER LOWER URINARY TRACT SYMPTOMS PRESENT: ICD-10-CM

## 2020-12-09 DIAGNOSIS — D68.59 HYPERCOAGULABLE STATE (HCC): ICD-10-CM

## 2020-12-09 DIAGNOSIS — E11.21 TYPE 2 DIABETES WITH NEPHROPATHY (HCC): ICD-10-CM

## 2020-12-09 DIAGNOSIS — J84.9 CHRONIC INTERSTITIAL LUNG DISEASE (HCC): ICD-10-CM

## 2020-12-09 DIAGNOSIS — I25.83 CORONARY ARTERY DISEASE DUE TO LIPID RICH PLAQUE: ICD-10-CM

## 2020-12-09 DIAGNOSIS — Z95.820 S/P ANGIOPLASTY WITH STENT: ICD-10-CM

## 2020-12-09 DIAGNOSIS — Z79.4 TYPE 2 DIABETES MELLITUS WITHOUT COMPLICATION, WITH LONG-TERM CURRENT USE OF INSULIN (HCC): Primary | ICD-10-CM

## 2020-12-09 DIAGNOSIS — R11.10 VOMITING, INTRACTABILITY OF VOMITING NOT SPECIFIED, PRESENCE OF NAUSEA NOT SPECIFIED, UNSPECIFIED VOMITING TYPE: ICD-10-CM

## 2020-12-09 DIAGNOSIS — E78.5 HYPERLIPIDEMIA LDL GOAL <100: ICD-10-CM

## 2020-12-09 DIAGNOSIS — I25.10 CORONARY ARTERY DISEASE DUE TO LIPID RICH PLAQUE: ICD-10-CM

## 2020-12-09 DIAGNOSIS — Z79.899 ENCOUNTER FOR LONG-TERM (CURRENT) USE OF MEDICATIONS: ICD-10-CM

## 2020-12-09 DIAGNOSIS — Z86.718 HISTORY OF VENOUS THROMBOEMBOLISM: ICD-10-CM

## 2020-12-09 DIAGNOSIS — E11.9 TYPE 2 DIABETES MELLITUS WITHOUT COMPLICATION, WITH LONG-TERM CURRENT USE OF INSULIN (HCC): Primary | ICD-10-CM

## 2020-12-09 PROCEDURE — 99442 PR PHYS/QHP TELEPHONE EVALUATION 11-20 MIN: CPT | Performed by: FAMILY MEDICINE

## 2020-12-09 RX ORDER — FINASTERIDE 5 MG/1
TABLET, FILM COATED ORAL
Qty: 90 TAB | Refills: 3 | Status: SHIPPED | OUTPATIENT
Start: 2020-12-09 | End: 2021-10-04 | Stop reason: SDUPTHER

## 2020-12-09 RX ORDER — INSULIN GLARGINE 100 [IU]/ML
INJECTION, SOLUTION SUBCUTANEOUS
Qty: 60 ML | Refills: 3 | Status: SHIPPED | OUTPATIENT
Start: 2020-12-09 | End: 2021-10-04 | Stop reason: SDUPTHER

## 2020-12-09 RX ORDER — METOPROLOL SUCCINATE 25 MG/1
25 TABLET, EXTENDED RELEASE ORAL DAILY
Qty: 90 TAB | Refills: 3 | Status: SHIPPED | OUTPATIENT
Start: 2020-12-09 | End: 2021-10-04 | Stop reason: SDUPTHER

## 2020-12-09 RX ORDER — TELMISARTAN 80 MG/1
80 TABLET ORAL DAILY
Qty: 90 TAB | Refills: 3 | Status: SHIPPED | OUTPATIENT
Start: 2020-12-09 | End: 2021-08-10 | Stop reason: SDUPTHER

## 2020-12-09 RX ORDER — LANSOPRAZOLE 30 MG/1
CAPSULE, DELAYED RELEASE ORAL
Qty: 90 CAP | Refills: 3 | Status: SHIPPED | OUTPATIENT
Start: 2020-12-09

## 2020-12-09 RX ORDER — FAMOTIDINE 20 MG/1
20 TABLET, FILM COATED ORAL 2 TIMES DAILY
Qty: 60 TAB | Refills: 0 | Status: SHIPPED | OUTPATIENT
Start: 2020-12-09 | End: 2020-12-11

## 2020-12-09 RX ORDER — METFORMIN HYDROCHLORIDE 1000 MG/1
TABLET ORAL
Qty: 180 TAB | Refills: 3 | Status: SHIPPED | OUTPATIENT
Start: 2020-12-09 | End: 2021-10-04 | Stop reason: SDUPTHER

## 2020-12-09 NOTE — PROGRESS NOTES
Chief Complaint   Patient presents with    Vomiting     weekly for 4weeks stopped 2 weeks ago   1. Have you been to the ER, urgent care clinic since your last visit? Hospitalized since your last visit? No    2. Have you seen or consulted any other health care providers outside of the [address] [place] [address] since your last visit? Include any pap smears or colon screening.  No

## 2020-12-09 NOTE — PROGRESS NOTES
Alejo Morley is a 80 y.o. male, evaluated via audio-only technology on 12/9/2020 for Vomiting (weekly for 4weeks stopped 2 weeks ago)  . Assessment & Plan:   Diagnoses and all orders for this visit:    1. Type 2 diabetes mellitus without complication, with long-term current use of insulin (HCC)  -     telmisartan (MICARDIS) 80 mg tablet; Take 1 Tab by mouth daily. -     metFORMIN (GLUCOPHAGE) 1,000 mg tablet; TAKE 1 TABLET TWICE A DAY  -     dapagliflozin (Farxiga) 5 mg tab tablet; Take 1 Tab by mouth daily. -     semaglutide (OZEMPIC) 1 mg/dose (2 mg/1.5 mL) sub-q pen; 1 mg by SubCUTAneous route every seven (7) days. To replaces previous order for Ozempic  -     insulin glargine (Lantus Solostar U-100 Insulin) 100 unit/mL (3 mL) inpn; Indications: PATIENT TAKING 50 UNITS  -     HEMOGLOBIN A1C WITH EAG  -     MICROALBUMIN, UR, RAND W/ MICROALB/CREAT RATIO  -     METABOLIC PANEL, COMPREHENSIVE  -     URINALYSIS W/ RFLX MICROSCOPIC  -     LIPID PANEL  -     [facility] 3RD GENERATION    2. Type 2 diabetes with nephropathy (HCC)  -     semaglutide (OZEMPIC) 1 mg/dose (2 mg/1.5 mL) sub-q pen; 1 mg by SubCUTAneous route every seven (7) days. To replaces previous order for Ozempic  -     HEMOGLOBIN A1C WITH EAG  -     MICROALBUMIN, UR, RAND W/ MICROALB/CREAT RATIO  -     METABOLIC PANEL, COMPREHENSIVE  -     URINALYSIS W/ RFLX MICROSCOPIC  -     LIPID PANEL  -     [facility] 3RD GENERATION    3. Essential hypertension  -     telmisartan (MICARDIS) 80 mg tablet; Take 1 Tab by mouth daily. -     metoprolol succinate (TOPROL-XL) 25 mg XL tablet; Take 1 Tab by mouth daily.  -     METABOLIC PANEL, COMPREHENSIVE  -     URINALYSIS W/ RFLX MICROSCOPIC    4.  Benign prostatic hyperplasia, unspecified whether lower urinary tract symptoms present  -     finasteride (PROSCAR) 5 mg tablet; TAKE ONE TABLET DAILY    5. Gastroesophageal reflux disease  -     lansoprazole (PREVACID) 30 mg capsule; TAKE 1 CAPSULE DAILY BEFORE BREAKFAST  - REFERRAL TO GASTROENTEROLOGY  -     famotidine (PEPCID) 20 mg tablet; Take 1 Tab by mouth two (2) times a day for 30 days. Take with Lansoprazole  -     XR BA SWALLOW ESOPHOGRAM; Future    6. Hypercoagulable state ([facility]  -     rivaroxaban (XARELTO) 20 mg tab tablet; Take 1 Tab by mouth daily. 7. Chronic interstitial lung disease ([facility]    8. Coronary artery disease due to lipid rich plaque  -     LIPID PANEL    9. S/P angioplasty with stent  -     LIPID PANEL    10. Hyperlipidemia LDL goal <100  -     HEMOGLOBIN A1C WITH EAG  -     METABOLIC PANEL, COMPREHENSIVE  -     LIPID PANEL  -     TSH 3RD GENERATION    11. History of venous thromboembolism  -     rivaroxaban (XARELTO) 20 mg tab tablet; Take 1 Tab by mouth daily. 12. Encounter for long-term (current) use of medications  -     HEMOGLOBIN A1C WITH EAG  -     MICROALBUMIN, UR, RAND W/ MICROALB/CREAT RATIO  -     CBC W/O DIFF  -     METABOLIC PANEL, COMPREHENSIVE  -     LIPASE  -     URINALYSIS W/ RFLX MICROSCOPIC  -     LIPID PANEL  -     TSH 3RD GENERATION    13. Vomiting, intractability of vomiting not specified, presence of nausea not specified, unspecified vomiting type  -     XR BA SWALLOW ESOPHOGRAM; Future        Follow-up and Dispositions    · Return in about 4 weeks (around 1/6/2021). 12  Subjective:     Starting 6 weeks ago, had vomiting episode 1x/week for 4 weeks and has since resolved. No specific circumstances, no hematochezia. Feeling well otherwise. Has had a long history of acid reflux and has been on Prevacid for a while. Has had issues where he has needed to be on additional agents which have helped. Schatzki's ring and hiatal hernia seen on EGD in 2018 by Dr. Cathy Vasquez. Describes symptoms more consistent with regurgitation at times. He is on Ozempic once weekly and Metformin but has been on these medicines for a while with no similar symptoms. Patient reports feeling well otherwise and has had no other new issues.   Denies any chest pain, shortness of breath beyond baseline, or palpitations. Has followed with specialists with virtual visits. Prior to Admission medications    Medication Sig Start Date End Date Taking? Authorizing Provider   telmisartan (MICARDIS) 80 mg tablet Take 1 Tab by mouth daily. 12/9/20  Yes Riley Tai MD   metFORMIN (GLUCOPHAGE) 1,000 mg tablet TAKE 1 TABLET TWICE A DAY 12/9/20  Yes Riley Tai MD   finasteride (PROSCAR) 5 mg tablet TAKE ONE TABLET DAILY 12/9/20  Yes Riley Tai MD   metoprolol succinate (TOPROL-XL) 25 mg XL tablet Take 1 Tab by mouth daily. 12/9/20  Yes Riley Tai MD   dapagliflozin Jazmyn Dandy) 5 mg tab tablet Take 1 Tab by mouth daily. 12/9/20  Yes Riley Tai MD   lansoprazole (PREVACID) 30 mg capsule TAKE 1 CAPSULE DAILY BEFORE BREAKFAST 12/9/20  Yes Riley Tai MD   semaglutide (OZEMPIC) 1 mg/dose (2 mg/1.5 mL) sub-q pen 1 mg by SubCUTAneous route every seven (7) days. To replaces previous order for Ozempic 12/9/20  Yes Riley Tai MD   rivaroxaban (XARELTO) 20 mg tab tablet Take 1 Tab by mouth daily. 12/9/20  Yes Riley Tai MD   insulin glargine (Lantus Solostar U-100 Insulin) 100 unit/mL (3 mL) inpn Indications: PATIENT TAKING 50 UNITS 12/9/20  Yes Riley Tai MD   famotidine (PEPCID) 20 mg tablet Take 1 Tab by mouth two (2) times a day for 30 days. Take with Lansoprazole 12/9/20 1/8/21 Yes Riley Tai MD   ascorbic acid, vitamin C, (Vitamin C) 500 mg tablet Take  by mouth. Yes Provider, Historical   Insulin Needles, Disposable, (ALTAGRACIA PEN NEEDLE) 32 gauge x 5/32\" ndle USE 1 NEEDLE UNDER THE SKIN DAILY. Diagnosis E 11.65 3/2/20  Yes Riley Tai MD   clopidogreL (PLAVIX) 75 mg tab Take 1 Tab by mouth daily. 1/29/20  Yes Adilene Hamilton III, DO   SYMBICORT 160-4.5 mcg/actuation HFAA Take 2 Puffs by inhalation two (2) times a day.  7/10/19  Yes Provider, Historical   nitroglycerin (NITROSTAT) 0.4 mg SL tablet place 1 tablet under the tongue AT [address], MAY REPE. ..  (REFER TO PRESCRIPTION NOTES). 9/5/18  Yes Provider, Historical   magnesium oxide (MAG-OX) 400 mg tablet Take 800 mg by mouth two (2) times a day. Yes Other, MD Maurice   diclofenac (VOLTAREN) 1 % gel Apply  to affected area four (4) times daily. 8/15/18  Yes Jett Mojica MD   cholecalciferol, VITAMIN D3, (VITAMIN D3) 5,000 unit tab tablet Take 1,000 Units by mouth daily. Nature Made    Yes Provider, Historical   cyanocobalamin (VITAMIN B-12) 1,000 mcg tablet Take 1,000 mcg by mouth daily. Nature Made    Yes Provider, Historical   cyclobenzaprine (FLEXERIL) 10 mg tablet Take 10 mg by mouth three (3) times daily as needed for Muscle Spasm(s). Yes Provider, Historical   semaglutide (OZEMPIC) 1 mg/dose (2 mg/1.5 mL) sub-q pen 1 mg by SubCUTAneous route every seven (7) days. To replaces previous order for Ozempic 7/21/20 12/9/20  Jett Mojica MD   rivaroxaban (XARELTO) 15 mg tab tablet Take 1 Tab by mouth daily. Patient taking differently: Take 20 mg by mouth daily. 1/29/20 12/9/20  Ewa Hamilton Oar III, DO   dapagliflozin (FARXIGA) 5 mg tab tablet Take 1 Tab by mouth daily. 12/2/19 12/9/20  Jett Mojica MD   finasteride (PROSCAR) 5 mg tablet TAKE ONE TABLET DAILY 12/2/19 12/9/20  Jett Mojica MD   lansoprazole (PREVACID) 30 mg capsule TAKE 1 CAPSULE DAILY BEFORE BREAKFAST 12/2/19 12/9/20  Jett Mojica MD   insulin glargine (LANTUS SOLOSTAR U-100 INSULIN) 100 unit/mL (3 mL) inpn Indications: PATIENT TAKING 45 UNITS  Indications: PATIENT TAKING 45 UNITS  Patient taking differently: Indications: PATIENT TAKING 50 UNITS 12/2/19 12/9/20  Jett Mojica MD   metFORMIN (GLUCOPHAGE) 1,000 mg tablet TAKE 1 TABLET TWICE A DAY 12/2/19 12/9/20  Jett Mojica MD   metoprolol succinate (TOPROL-XL) 25 mg XL tablet Take 1 Tab by mouth daily.  12/2/19 12/9/20  Jett Mojica MD   telmisartan (MICARDIS) 80 mg tablet Take 1 Tab by mouth daily. 12/2/19 12/9/20  Nba Cunningham MD   aspirin 81 mg chewable tablet Take 1 Tab by mouth daily. 8/18/18   Patricia Rollins MD       has Diabetes mellitus, type II ([facility] Gastroesophageal reflux disease, History of venous thromboembolism, Encounter for monitoring Coumadin therapy, Essential hypertension, Obesity, morbid ([address] Type 2 diabetes with nephropathy ([address] and NSTEMI (non-ST elevated myocardial infarction) ([address] on their problem list.     has a past medical history of Arthritis, Back injury (1994), CAD (coronary artery disease), Diabetes ([address] GERD (gastroesophageal reflux disease), Hypertension, Muscle cramps, PUD (peptic ulcer disease), Reflux, and Thromboembolus ([address] has a past surgical history that includes hx other surgical; hx orthopaedic (Left); hx orthopaedic; upper gi endoscopy,dilatn w guide (5/16/2018); and upper gi endoscopy,biopsy (5/16/2018). ROS  Gen - no fever/chills  Resp - no dyspnea or cough  CV - no chest pain or DIAZ  Rest per HPI    Patient-Reported Vitals 12/9/2020   Patient-Reported Pulse 70   Patient-Reported Systolic  473   Patient-Reported Diastolic 74        Wilmer Boothe, who was evaluated through a patient-initiated, synchronous (real-time) audio only encounter, and/or her healthcare decision maker, is aware that it is a billable service, with coverage as determined by his insurance carrier. He provided verbal consent to proceed: Yes. He has not had a related appointment within my department in the past 7 days or scheduled within the next 24 hours.       Total Time: minutes: 11-20 minutes    Anola Severance, MD

## 2020-12-23 ENCOUNTER — TRANSCRIBE ORDER (OUTPATIENT)
Dept: SCHEDULING | Age: 85
End: 2020-12-23

## 2020-12-23 DIAGNOSIS — J84.113 IDIOPATHIC NON-SPECIFIC INTERSTITIAL PNEUMONITIS (HCC): Primary | ICD-10-CM

## 2020-12-28 ENCOUNTER — HOSPITAL ENCOUNTER (OUTPATIENT)
Dept: GENERAL RADIOLOGY | Age: 85
Discharge: HOME | End: 2020-12-28
Attending: FAMILY MEDICINE
Payer: MEDICARE

## 2020-12-28 DIAGNOSIS — K21.9 GASTROESOPHAGEAL REFLUX DISEASE: ICD-10-CM

## 2020-12-28 DIAGNOSIS — R11.10 VOMITING, INTRACTABILITY OF VOMITING NOT SPECIFIED, PRESENCE OF NAUSEA NOT SPECIFIED, UNSPECIFIED VOMITING TYPE: ICD-10-CM

## 2020-12-28 PROCEDURE — 74220 X-RAY XM ESOPHAGUS 1CNTRST: CPT

## 2021-01-28 LAB
ALBUMIN SERPL-MCNC: 4.2 G/DL (ref 3.6–4.6)
ALBUMIN/CREAT UR: 13 MG/G CREAT (ref 0–29)
ALBUMIN/GLOB SERPL: 1.5 {RATIO} (ref 1.2–2.2)
ALP SERPL-CCNC: 88 IU/L (ref 39–117)
ALT SERPL-CCNC: 13 IU/L (ref 0–44)
APPEARANCE UR: CLEAR
AST SERPL-CCNC: 32 IU/L (ref 0–40)
BILIRUB SERPL-MCNC: 0.6 MG/DL (ref 0–1.2)
BILIRUB UR QL STRIP: NEGATIVE
BUN SERPL-MCNC: 11 MG/DL (ref 8–27)
BUN/CREAT SERPL: 10 (ref 10–24)
CALCIUM SERPL-MCNC: 9.5 MG/DL (ref 8.6–10.2)
CHLORIDE SERPL-SCNC: 104 MMOL/L (ref 96–106)
CHOLEST SERPL-MCNC: 195 MG/DL (ref 100–199)
CO2 SERPL-SCNC: 24 MMOL/L (ref 20–29)
COLOR UR: YELLOW
CREAT SERPL-MCNC: 1.14 MG/DL (ref 0.76–1.27)
CREAT UR-MCNC: 65.7 MG/DL
ERYTHROCYTE [DISTWIDTH] IN BLOOD BY AUTOMATED COUNT: 13.2 % (ref 11.6–15.4)
EST. AVERAGE GLUCOSE BLD GHB EST-MCNC: 114 MG/DL
GLOBULIN SER CALC-MCNC: 2.8 G/DL (ref 1.5–4.5)
GLUCOSE SERPL-MCNC: 168 MG/DL (ref 65–99)
GLUCOSE UR QL: ABNORMAL
HBA1C MFR BLD: 5.6 % (ref 4.8–5.6)
HCT VFR BLD AUTO: 45 % (ref 37.5–51)
HDLC SERPL-MCNC: 42 MG/DL
HGB BLD-MCNC: 15.1 G/DL (ref 13–17.7)
HGB UR QL STRIP: NEGATIVE
INTERPRETATION: NORMAL
KETONES UR QL STRIP: NEGATIVE
LDLC SERPL CALC-MCNC: 122 MG/DL (ref 0–99)
LEUKOCYTE ESTERASE UR QL STRIP: NEGATIVE
LIPASE SERPL-CCNC: 59 U/L (ref 13–78)
Lab: NORMAL
MCH RBC QN AUTO: 31.1 PG (ref 26.6–33)
MCHC RBC AUTO-ENTMCNC: 33.6 G/DL (ref 31.5–35.7)
MCV RBC AUTO: 93 FL (ref 79–97)
MICRO URNS: ABNORMAL
MICROALBUMIN UR-MCNC: 8.7 UG/ML
NITRITE UR QL STRIP: NEGATIVE
PH UR STRIP: 6.5 [PH] (ref 5–7.5)
PLATELET # BLD AUTO: 300 X10E3/UL (ref 150–450)
POTASSIUM SERPL-SCNC: 5 MMOL/L (ref 3.5–5.2)
PROT SERPL-MCNC: 7 G/DL (ref 6–8.5)
PROT UR QL STRIP: NEGATIVE
RBC # BLD AUTO: 4.86 X10E6/UL (ref 4.14–5.8)
SODIUM SERPL-SCNC: 143 MMOL/L (ref 134–144)
SP GR UR: 1.02 (ref 1–1.03)
TRIGL SERPL-MCNC: 173 MG/DL (ref 0–149)
TSH SERPL DL<=0.005 MIU/L-ACNC: 3.07 UIU/ML (ref 0.45–4.5)
UROBILINOGEN UR STRIP-MCNC: 0.2 MG/DL (ref 0.2–1)
VLDLC SERPL CALC-MCNC: 31 MG/DL (ref 5–40)
WBC # BLD AUTO: 6.7 X10E3/UL (ref 3.4–10.8)

## 2021-03-10 DIAGNOSIS — Z79.4 TYPE 2 DIABETES MELLITUS WITHOUT COMPLICATION, WITH LONG-TERM CURRENT USE OF INSULIN (HCC): ICD-10-CM

## 2021-03-10 DIAGNOSIS — E11.9 TYPE 2 DIABETES MELLITUS WITHOUT COMPLICATION, WITH LONG-TERM CURRENT USE OF INSULIN (HCC): ICD-10-CM

## 2021-03-10 RX ORDER — PEN NEEDLE, DIABETIC 31 GX3/16"
NEEDLE, DISPOSABLE MISCELLANEOUS
Qty: 200 PEN NEEDLE | Refills: 0 | Status: SHIPPED | OUTPATIENT
Start: 2021-03-10 | End: 2021-09-09

## 2021-03-25 ENCOUNTER — HOSPITAL ENCOUNTER (OUTPATIENT)
Dept: CT IMAGING | Age: 86
Discharge: HOME | End: 2021-03-25
Attending: INTERNAL MEDICINE
Payer: MEDICARE

## 2021-03-25 DIAGNOSIS — J84.113 IDIOPATHIC NON-SPECIFIC INTERSTITIAL PNEUMONITIS (HCC): ICD-10-CM

## 2021-03-25 PROCEDURE — 71250 CT THORAX DX C-: CPT

## 2021-04-08 ENCOUNTER — VIRTUAL VISIT (OUTPATIENT)
Dept: FAMILY MEDICINE CLINIC | Age: 86
End: 2021-04-08
Payer: MEDICARE

## 2021-04-08 DIAGNOSIS — J84.9 CHRONIC INTERSTITIAL LUNG DISEASE (HCC): Primary | ICD-10-CM

## 2021-04-08 DIAGNOSIS — Z86.718 HISTORY OF VENOUS THROMBOEMBOLISM: ICD-10-CM

## 2021-04-08 DIAGNOSIS — E66.01 OBESITY, MORBID (HCC): ICD-10-CM

## 2021-04-08 DIAGNOSIS — I25.83 CORONARY ARTERY DISEASE DUE TO LIPID RICH PLAQUE: ICD-10-CM

## 2021-04-08 DIAGNOSIS — E11.21 TYPE 2 DIABETES WITH NEPHROPATHY (HCC): ICD-10-CM

## 2021-04-08 DIAGNOSIS — E78.5 HYPERLIPIDEMIA LDL GOAL <100: ICD-10-CM

## 2021-04-08 DIAGNOSIS — I25.10 CORONARY ARTERY DISEASE DUE TO LIPID RICH PLAQUE: ICD-10-CM

## 2021-04-08 DIAGNOSIS — D68.59 HYPERCOAGULABLE STATE (HCC): ICD-10-CM

## 2021-04-08 DIAGNOSIS — Z95.820 S/P ANGIOPLASTY WITH STENT: ICD-10-CM

## 2021-04-08 PROCEDURE — 99214 OFFICE O/P EST MOD 30 MIN: CPT | Performed by: FAMILY MEDICINE

## 2021-04-08 PROCEDURE — G8428 CUR MEDS NOT DOCUMENT: HCPCS | Performed by: FAMILY MEDICINE

## 2021-04-08 PROCEDURE — 1101F PT FALLS ASSESS-DOCD LE1/YR: CPT | Performed by: FAMILY MEDICINE

## 2021-04-08 PROCEDURE — G8432 DEP SCR NOT DOC, RNG: HCPCS | Performed by: FAMILY MEDICINE

## 2021-04-08 RX ORDER — BENZONATATE 200 MG/1
CAPSULE ORAL
COMMUNITY
Start: 2021-04-01

## 2021-04-08 RX ORDER — NINTEDANIB 150 MG/1
CAPSULE ORAL
COMMUNITY
Start: 2021-04-07 | End: 2021-07-12

## 2021-04-08 RX ORDER — ALBUTEROL SULFATE 90 UG/1
AEROSOL, METERED RESPIRATORY (INHALATION)
COMMUNITY
Start: 2021-01-05

## 2021-04-08 NOTE — PROGRESS NOTES
Chief Complaint   Patient presents with    Follow-up     6 month   1. Have you been to the ER, urgent care clinic since your last visit? Hospitalized since your last visit? No    2. Have you seen or consulted any other health care providers outside of the [address] [place] [address] since your last visit? Include any pap smears or colon screening.  No     First COVID Vaccine 3/16/21

## 2021-04-08 NOTE — PROGRESS NOTES
Delano Lee (: 1934) is a 80 y.o. male, established patient, here for evaluation of the following chief complaint(s):   Follow-up (6 month)       ASSESSMENT/PLAN:  Continues working with Dr. Enrike Kuo on chronic ILD causing worsening symptoms dyspnea and cough. Planning to start new biologic type medication for this. Diabetes remains well controlled. Encouraged to work on diet and exercise. Patient would like to plan to have labs checked in 3 months given his current issues ILD and awaiting his second Covid vaccine. 1. Chronic interstitial lung disease ([address]  2. Type 2 diabetes with nephropathy (HCC)  3. Obesity, morbid ([address]  4. Hypercoagulable state ([address]  5. Coronary artery disease due to lipid rich plaque  6. S/P angioplasty with stent  7. Hyperlipidemia LDL goal <100  8. History of venous thromboembolism      Return in about 3 months (around 2021), or if symptoms worsen or fail to improve. SUBJECTIVE/OBJECTIVE:    Has been able to get his first [employer] Covid vaccine and planning to get his second Covid vaccination next week. Chronic ILD - Working with Dr. Enrike Kuo and planning to start on new medication to help with this. Worsening on CT as below. CT Chest 3/25/2021:  \"IMPRESSION  1. Persistent bilateral fibrotic abnormality involving all lobes. This has progressed in the interval. The progression is best visualized laterally in each upper lobe right greater than left with increasing areas of bronchiolectasis and honeycombing. There is also regression seen in the periphery of the lower lobes  with increased reticulation. There is probably still air trapping present though this cannot be evaluated on today's examination as described above. These findings could be related to chronic hypersensitivity pneumonitis. Exact etiology is uncertain\"    GERD - ct to struggle with sx. Dealing with belching, regurgitation. Worse with coughing from ILD.   Has had a long history of acid reflux and has been on Prevacid for a while. Has had issues where he has needed to be on additional agents which have helped. Schatzki's ring and hiatal hernia seen on EGD in 2018 by Dr. Donte Vargas. Had no significant improvement with changes to Ozempic and Metformin in the past.    Diabetes still well controlled with last A1c of 5.6%. Patient reports feeling well otherwise and has had no other new issues. Denies any chest pain, shortness of breath beyond baseline, or palpitations. Has followed with specialists with virtual visits. ROS  Gen - no fever/chills  Resp -per hpi  CV - per hpi  Rest per HPI    Patient-Reported Vitals 4/8/2021   Patient-Reported Weight 247lbs   Patient-Reported Pulse 71   Patient-Reported Temperature 96.8   Patient-Reported Systolic  189   Patient-Reported Diastolic 76     Physical exam:  General appearance - alert, well appearing, and in no distress  Eyes -sclera anicteric, no discharge  HEENT normocephalic, atraumatic, moist mucous membranes, no visualized neck mass  Chest -normal respiratory effort, no visualized signs of respiratory distress  Neurological - alert, awake, normal speech, no focal findings or movement disorder noted  Psych - normal mood and affect  Skin no apparent lesions    On this date 04/08/2021 I have spent 30 minutes reviewing previous notes, test results and face to face (virtual) with the patient discussing the diagnosis and importance of compliance with the treatment plan as well as documenting on the day of the visit. Adrienne Cevallos, was evaluated through a synchronous (real-time) audio-video encounter. The patient (or guardian if applicable) is aware that this is a billable service. Verbal consent to proceed has been obtained within the past 12 months.  The visit was conducted pursuant to the emergency declaration under the [facility], [address] [place] [address] authority and the [employer] and [facility] Act.  Patient identification was verified, and a caregiver was present when appropriate. The patient was located in a state where the provider was credentialed to provide care. An electronic signature was used to authenticate this note.   -- Laura Messina MD

## 2021-06-23 DIAGNOSIS — E11.21 TYPE 2 DIABETES WITH NEPHROPATHY (HCC): ICD-10-CM

## 2021-06-23 DIAGNOSIS — Z79.4 TYPE 2 DIABETES MELLITUS WITHOUT COMPLICATION, WITH LONG-TERM CURRENT USE OF INSULIN (HCC): ICD-10-CM

## 2021-06-23 DIAGNOSIS — E11.9 TYPE 2 DIABETES MELLITUS WITHOUT COMPLICATION, WITH LONG-TERM CURRENT USE OF INSULIN (HCC): ICD-10-CM

## 2021-06-23 RX ORDER — SEMAGLUTIDE 1.34 MG/ML
INJECTION, SOLUTION SUBCUTANEOUS
Qty: 9 ML | Refills: 3 | Status: SHIPPED | OUTPATIENT
Start: 2021-06-23 | End: 2021-10-04 | Stop reason: SDUPTHER

## 2021-07-08 ENCOUNTER — TRANSCRIBE ORDER (OUTPATIENT)
Dept: SCHEDULING | Age: 86
End: 2021-07-08

## 2021-07-13 ENCOUNTER — HOSPITAL ENCOUNTER (OUTPATIENT)
Age: 86
Setting detail: OUTPATIENT SURGERY
Discharge: HOME | End: 2021-07-13
Attending: INTERNAL MEDICINE | Admitting: INTERNAL MEDICINE
Payer: MEDICARE

## 2021-07-13 ENCOUNTER — ANESTHESIA EVENT (OUTPATIENT)
Dept: ENDOSCOPY | Age: 86
End: 2021-07-13
Payer: MEDICARE

## 2021-07-13 ENCOUNTER — ANESTHESIA (OUTPATIENT)
Dept: ENDOSCOPY | Age: 86
End: 2021-07-13
Payer: MEDICARE

## 2021-07-13 VITALS
OXYGEN SATURATION: 100 % | HEIGHT: 71 IN | BODY MASS INDEX: 33.1 KG/M2 | TEMPERATURE: 98 F | HEART RATE: 67 BPM | SYSTOLIC BLOOD PRESSURE: 114 MMHG | DIASTOLIC BLOOD PRESSURE: 58 MMHG | WEIGHT: 236.4 LBS | RESPIRATION RATE: 25 BRPM

## 2021-07-13 LAB
GLUCOSE BLD STRIP.AUTO-MCNC: 98 MG/DL (ref 65–117)
SERVICE CMNT-IMP: NORMAL

## 2021-07-13 PROCEDURE — 2709999900 HC NON-CHARGEABLE SUPPLY: Performed by: INTERNAL MEDICINE

## 2021-07-13 PROCEDURE — 74011250636 HC RX REV CODE- 250/636: Performed by: NURSE ANESTHETIST, CERTIFIED REGISTERED

## 2021-07-13 PROCEDURE — 74011250636 HC RX REV CODE- 250/636: Performed by: INTERNAL MEDICINE

## 2021-07-13 PROCEDURE — 82962 GLUCOSE BLOOD TEST: CPT

## 2021-07-13 PROCEDURE — 76060000031 HC ANESTHESIA FIRST 0.5 HR: Performed by: INTERNAL MEDICINE

## 2021-07-13 PROCEDURE — 74011000250 HC RX REV CODE- 250: Performed by: NURSE ANESTHETIST, CERTIFIED REGISTERED

## 2021-07-13 PROCEDURE — 77030039825 HC MSK NSL PAP SUPERNO2VA VYRM -B: Performed by: ANESTHESIOLOGY

## 2021-07-13 PROCEDURE — 76040000019: Performed by: INTERNAL MEDICINE

## 2021-07-13 RX ORDER — SODIUM CHLORIDE 9 MG/ML
25 INJECTION, SOLUTION INTRAVENOUS CONTINUOUS
Status: DISCONTINUED | OUTPATIENT
Start: 2021-07-13 | End: 2021-07-13 | Stop reason: HOSPADM

## 2021-07-13 RX ORDER — FLUMAZENIL 0.1 MG/ML
0.2 INJECTION INTRAVENOUS
Status: DISCONTINUED | OUTPATIENT
Start: 2021-07-13 | End: 2021-07-13 | Stop reason: HOSPADM

## 2021-07-13 RX ORDER — SODIUM CHLORIDE 0.9 % (FLUSH) 0.9 %
5-40 SYRINGE (ML) INJECTION EVERY 8 HOURS
Status: DISCONTINUED | OUTPATIENT
Start: 2021-07-13 | End: 2021-07-13 | Stop reason: HOSPADM

## 2021-07-13 RX ORDER — EPINEPHRINE 0.1 MG/ML
1 INJECTION INTRACARDIAC; INTRAVENOUS
Status: DISCONTINUED | OUTPATIENT
Start: 2021-07-13 | End: 2021-07-13 | Stop reason: HOSPADM

## 2021-07-13 RX ORDER — LIDOCAINE HYDROCHLORIDE 20 MG/ML
INJECTION, SOLUTION EPIDURAL; INFILTRATION; INTRACAUDAL; PERINEURAL AS NEEDED
Status: DISCONTINUED | OUTPATIENT
Start: 2021-07-13 | End: 2021-07-13 | Stop reason: HOSPADM

## 2021-07-13 RX ORDER — NALOXONE HYDROCHLORIDE 0.4 MG/ML
0.4 INJECTION, SOLUTION INTRAMUSCULAR; INTRAVENOUS; SUBCUTANEOUS
Status: DISCONTINUED | OUTPATIENT
Start: 2021-07-13 | End: 2021-07-13 | Stop reason: HOSPADM

## 2021-07-13 RX ORDER — ATROPINE SULFATE 0.1 MG/ML
0.5 INJECTION INTRAVENOUS
Status: DISCONTINUED | OUTPATIENT
Start: 2021-07-13 | End: 2021-07-13 | Stop reason: HOSPADM

## 2021-07-13 RX ORDER — SODIUM CHLORIDE 0.9 % (FLUSH) 0.9 %
5-40 SYRINGE (ML) INJECTION AS NEEDED
Status: DISCONTINUED | OUTPATIENT
Start: 2021-07-13 | End: 2021-07-13 | Stop reason: HOSPADM

## 2021-07-13 RX ORDER — DEXTROMETHORPHAN/PSEUDOEPHED 2.5-7.5/.8
1.2 DROPS ORAL
Status: DISCONTINUED | OUTPATIENT
Start: 2021-07-13 | End: 2021-07-13 | Stop reason: HOSPADM

## 2021-07-13 RX ORDER — PROPOFOL 10 MG/ML
INJECTION, EMULSION INTRAVENOUS AS NEEDED
Status: DISCONTINUED | OUTPATIENT
Start: 2021-07-13 | End: 2021-07-13 | Stop reason: HOSPADM

## 2021-07-13 RX ADMIN — PROPOFOL 50 MG: 10 INJECTION, EMULSION INTRAVENOUS at 07:58

## 2021-07-13 RX ADMIN — PROPOFOL 50 MG: 10 INJECTION, EMULSION INTRAVENOUS at 08:00

## 2021-07-13 RX ADMIN — LIDOCAINE HYDROCHLORIDE 100 MG: 20 INJECTION, SOLUTION EPIDURAL; INFILTRATION; INTRACAUDAL; PERINEURAL at 07:57

## 2021-07-13 RX ADMIN — SODIUM CHLORIDE 25 ML/HR: 9 INJECTION, SOLUTION INTRAVENOUS at 07:46

## 2021-07-13 RX ADMIN — PROPOFOL 50 MG: 10 INJECTION, EMULSION INTRAVENOUS at 07:57

## 2021-07-13 NOTE — PROCEDURES
NAME:  Cody Chan   :   1934   MRN:   [id number]     Date/Time:  2021 7:53 AM    Esophagogastroduodenoscopy (EGD) Procedure Note    Procedure: Esophagogastroduodenoscopy --diagnostic    Indication: GERD  Pre-operative Diagnosis: see indication above  Post-operative Diagnosis: see findings below  :  Calderon Mendoza MD  Referring Provider:   Kelsi James MD    Exam:  Airway: clear, no airway problems anticipated  Heart: RRR, without gallops or rubs  Lungs: clear bilaterally without wheezes, crackles, or rhonchi  Abdomen: soft, nontender, nondistended, bowel sounds present  Mental Status: awake, alert and oriented to person, place and time     Anethesia/Sedation:  MAC anesthesia  Procedure Details   After informed consent was obtained for the procedure, with all risks and benefits of procedure explained the patient was taken to the endoscopy suite and placed in the left lateral decubitus position. Following sequential administration of sedation as per above, the [id number] gastroscope was inserted into the mouth and advanced under direct vision to third portion of the duodenum. A careful inspection was made as the gastroscope was withdrawn, including a retroflexed view of the proximal stomach; findings and interventions are described below. Findings:   OROPHARYNX: Cords and pyriform recesses normal.   ESOPHAGUS:   -- mildly tortuous esophagus  -- distal esophageal ring, non-obstructing  -- intact Z-line   STOMACH: The fundus on antegrade and retroflex views reveals a small sliding 1 cm hiatal hernia. The body, antrum, and pylorus have patchy erythema, not biopsies as still on xarelto until yesterday. DUODENUM: The bulb, second and third portions are normal.    Therapies:   none    Specimens: none    EBL:  None. Complications:   None; patient tolerated the procedure well.            Impression:  -- small sliding hiatal hernia and associated non-obstructing Schatzki's ring on esophageal side  -- reflux esophagitis, mild  -- moderate gastritis, particularly in antrum and body (not biopsied b/c he took xarelto yesterday)    Recommendations:  -- PPI daily, lifelong  -- follow up in office    Discharge disposition:  Home in the company of  when able to ambulate    Garett Doll MD

## 2021-07-13 NOTE — ANESTHESIA POSTPROCEDURE EVALUATION
Procedure(s):  ESOPHAGOGASTRODUODENOSCOPY (EGD). total IV anesthesia    Anesthesia Post Evaluation        Patient location during evaluation: PACU  Note status: Adequate. Level of consciousness: responsive to verbal stimuli and sleepy but conscious  Pain management: satisfactory to patient  Airway patency: patent  Anesthetic complications: no  Cardiovascular status: acceptable  Respiratory status: acceptable  Hydration status: acceptable  Comments: +Post-Anesthesia Evaluation and Assessment    Patient: Jacob Beckett MRN: [id number]  SSN: [id number]   YOB: 1934  Age: 80 y.o. Sex: male      Cardiovascular Function/Vital Signs    BP (!) 95/47   Pulse 67   Temp 36.7 °C (98 °F)   Resp 24   Ht 5' 11\" (1.803 m)   Wt 107.2 kg (236 lb 6.4 oz)   SpO2 97%   BMI 32.97 kg/m²     Patient is status post Procedure(s):  ESOPHAGOGASTRODUODENOSCOPY (EGD). Nausea/Vomiting: Controlled. Postoperative hydration reviewed and adequate. Pain:  Pain Scale 1: Numeric (0 - 10) (07/13/21 0740)  Pain Intensity 1: 0 (07/13/21 0740)   Managed. Neurological Status: At baseline. Mental Status and Level of Consciousness: Arousable. Pulmonary Status:   O2 Device: None (07/13/21 0808)   Adequate oxygenation and airway patent. Complications related to anesthesia: None    Post-anesthesia assessment completed. No concerns.     Signed By: Fabienne Alfaro MD    7/13/2021  Post anesthesia nausea and vomiting:  controlled      INITIAL Post-op Vital signs:   Vitals Value Taken Time   BP 95/47 07/13/21 0808   Temp 36.7 °C (98 °F) 07/13/21 0808   Pulse 67 07/13/21 0808   Resp 24 07/13/21 0808   SpO2 97 % 07/13/21 0808

## 2021-07-13 NOTE — ADDENDUM NOTE
Addendum  created 07/13/21 0855 by Russell Dias CRNA    Flowsheet accepted, Intraprocedure Flowsheets edited

## 2021-07-13 NOTE — DISCHARGE INSTRUCTIONS
Sylvia White  [id number]  1934    EGD DISCHARGE INSTRUCTIONS  Discomfort:  Redness at IV site- apply warm compress to area; if redness or soreness persist- contact your physician  There may be a slight amount of blood passed from the rectum  Gaseous discomfort- walking, belching will help relieve any discomfort  You may not operate a vehicle for 12 hours  You may not engage in an occupation involving machinery or appliances for rest of today  You may not drink alcoholic beverages for at least 12 hours  Avoid making any critical decisions for at least 24 hour  DIET:   Regular diet. - however -  remember your colon is empty and a heavy meal will produce gas. Avoid these foods:  vegetables, fried / greasy foods, carbonated drinks for today  MEDICATION:  (See attached)     ACTIVITY:  You may not resume your normal daily activities until tomorrow AM; it is recommended that you spend the remainder of the day resting -  avoid any strenuous activity. CALL M.D. ANY SIGN OF:   Increasing pain, nausea, vomiting  Abdominal distension (swelling)  New increased bleeding (oral or rectal)  Fever (chills)  Pain in chest area  Bloody discharge from nose or mouth  Shortness of breath      IMPRESSION:  -- some irritation in the stomach and in the lower esophagus from reflux, not too bad. -- also, a small hiatal hernia was seen, which is when the very top of the stomach can slide up an inch or two into the lower chest. This is a common finding in patients with reflux.      Follow-up Instructions:   Call Dr. Maribel Lucio for questions  Appointment in 2-3 months in office  Telephone # [phone]    Aliyah Haynes MD

## 2021-07-13 NOTE — ANESTHESIA PREPROCEDURE EVALUATION
Anesthetic History   No history of anesthetic complications            Review of Systems / Medical History  Patient summary reviewed, nursing notes reviewed and pertinent labs reviewed    Pulmonary          Smoker      Comments: Former smoker   Neuro/Psych   Within defined limits           Cardiovascular    Hypertension: well controlled          CAD    Exercise tolerance: >4 METS  Comments: Coronary stent   GI/Hepatic/Renal     GERD: poorly controlled      PUD     Endo/Other    Diabetes: well controlled, type 2, using insulin    Morbid obesity and arthritis     Other Findings              Physical Exam    Airway  Mallampati: III  TM Distance: 4 - 6 cm  Neck ROM: normal range of motion   Mouth opening: Normal     Cardiovascular    Rhythm: regular  Rate: normal         Dental    Dentition: Full lower dentures and Full upper dentures     Pulmonary  Breath sounds clear to auscultation               Abdominal  GI exam deferred       Other Findings            Anesthetic Plan    ASA: 3  Anesthesia type: total IV anesthesia          Induction: Intravenous  Anesthetic plan and risks discussed with: Patient

## 2021-07-13 NOTE — H&P
Gastroenterology Outpatient History and Physical    Patient: Teresita Maynard    Physician: Sariah Grullon MD    Chief Complaint: GERD  History of Present Illness: 81 yo M with GERD, pulmonary fibrosis, question if related. History:  Past Medical History:   Diagnosis Date    Arthritis     Back injury 12    CAD (coronary artery disease)     Stent replacement    Diabetes ([address]     GERD (gastroesophageal reflux disease)     Hypertension     Muscle cramps     PUD (peptic ulcer disease)     Reflux     Thromboembolus (HCC)     L BLE      Past Surgical History:   Procedure Laterality Date    HX CORONARY STENT PLACEMENT      x 2    HX ORTHOPAEDIC Left     Crushed/left index finger amputee    HX ORTHOPAEDIC      heal spur removed left foot    HX OTHER SURGICAL      hand surgery, heal spur L    UPPER GI ENDOSCOPY,BIOPSY  2018         UPPER GI ENDOSCOPY,DILATN W GUIDE  2018           Social History     Socioeconomic History    Marital status:      Spouse name: Not on file    Number of children: Not on file    Years of education: Not on file    Highest education level: Not on file   Tobacco Use    Smoking status: Former Smoker     Packs/day: 1.00     Quit date: 1975     Years since quittin.5    Smokeless tobacco: Never Used   Vaping Use    Vaping Use: Never used   Substance and Sexual Activity    Alcohol use: Not Currently    Drug use: No    Sexual activity: Yes     Partners: Female     Social Determinants of Health     Financial Resource Strain:     Difficulty of Paying Living Expenses:    Food Insecurity:     Worried About Running Out of Food in the Last Year:     Ran Out of Food in the Last Year:    Transportation Needs:     Lack of Transportation (Medical):      Lack of Transportation (Non-Medical):    Physical Activity:     Days of Exercise per Week:     Minutes of Exercise per Session:    Stress:     Feeling of Stress :    Social Connections:     Frequency of Communication with Friends and Family:     Frequency of Social Gatherings with Friends and Family:     Attends Hoah[employer]ism Services:     Active Member of Clubs or Organizations:     Attends Club or Organization Meetings:     Marital Status:       Family History   Problem Relation Age of Onset    Heart Disease Brother     Heart Disease Mother     Bleeding Prob Father     Tuberculosis Sister       Patient Active Problem List   Diagnosis Code    Diabetes mellitus, type II ([facility] E11.9    Gastroesophageal reflux disease K21.9    History of venous thromboembolism Z86.718    Encounter for monitoring Coumadin therapy Z51.81, Z79.01    Essential hypertension I10    Obesity, morbid ([facility] E66.01    Type 2 diabetes with nephropathy ([facility] E11.21    NSTEMI (non-ST elevated myocardial infarction) ([facility] I21.4       Allergies: Allergies   Allergen Reactions    Ofev [Nintedanib] Diarrhea and Nausea and Vomiting    Statins-Hmg-Coa Reductase Inhibitors Myalgia     Tried several statins. Medications:   Prior to Admission medications    Medication Sig Start Date End Date Taking? Authorizing Provider   Ozempic 1 mg/dose (2 mg/1.5 mL) sub-q pen INJECT 1 MG UNDER THE SKIN EVERY 7 DAYS (TO REPLACE PREVIOUS ORDER FOR OZEMPIC) 6/23/21  Yes Fernie Franklin MD   benzonatate (TESSALON) 200 mg capsule TAKE 1 CAPSULE BY MOUTH THREE TIMES DAILY 4/1/21  Yes Provider, Historical   ProAir HFA 90 mcg/actuation inhaler  1/5/21  Yes Provider, Historical   Insulin Needles, Disposable, (Linda Pen Needle) 32 gauge x 5/32\" ndle USE 1 NEEDLE UNDER THE SKIN DAILY 3/10/21  Yes Fernie Franklin MD   telmisartan (MICARDIS) 80 mg tablet Take 1 Tab by mouth daily.  12/9/20  Yes Fernie Franklin MD   metFORMIN (GLUCOPHAGE) 1,000 mg tablet TAKE 1 TABLET TWICE A DAY 12/9/20  Yes Fernie Franklin MD   finasteride (PROSCAR) 5 mg tablet TAKE ONE TABLET DAILY 12/9/20  Yes Fernie Franklin MD   metoprolol succinate (TOPROL-XL) 25 mg XL tablet Take 1 Tab by mouth daily. 12/9/20  Yes Dinorah Ellis MD   dapagliflozin Shanta Tolliver) 5 mg tab tablet Take 1 Tab by mouth daily. 12/9/20  Yes Dinorah Ellis MD   lansoprazole (PREVACID) 30 mg capsule TAKE 1 CAPSULE DAILY BEFORE BREAKFAST 12/9/20  Yes Dinorah Ellis MD   rivaroxaban (XARELTO) 20 mg tab tablet Take 1 Tab by mouth daily. 12/9/20  Yes Dinorah Ellis MD   insulin glargine (Lantus Solostar U-100 Insulin) 100 unit/mL (3 mL) inpn Indications: PATIENT TAKING 50 UNITS 12/9/20  Yes Dinorah Ellis MD   SYMBICORT 160-4.5 mcg/actuation HFAA Take 2 Puffs by inhalation two (2) times a day. 7/10/19  Yes Provider, Historical   aspirin 81 mg chewable tablet Take 1 Tab by mouth daily. 8/18/18  Yes Loco Coronado MD   magnesium oxide (MAG-OX) 400 mg tablet Take 800 mg by mouth two (2) times a day. Yes Other, MD Maurice   cholecalciferol, VITAMIN D3, (VITAMIN D3) 5,000 unit tab tablet Take 1,000 Units by mouth daily. Nature [employer]    Yes Provider, Historical   cyanocobalamin (VITAMIN B-12) 1,000 mcg tablet Take 1,000 mcg by mouth daily. Nature [employer]    Yes Provider, Historical   nitroglycerin (NITROSTAT) 0.4 mg SL tablet place 1 tablet under the tongue AT FIRST SIGN OF ATTACK, MAY REPE. ..  (REFER TO PRESCRIPTION NOTES). Patient not taking: Reported on 7/12/2021 9/5/18   Provider, Historical   diclofenac (VOLTAREN) 1 % gel Apply  to affected area four (4) times daily. 8/15/18   Dinorah Ellis MD   cyclobenzaprine (FLEXERIL) 10 mg tablet Take 10 mg by mouth three (3) times daily as needed for Muscle Spasm(s). Provider, Historical     Physical Exam:   Vital Signs: Blood pressure (!) 118/54, pulse 75, temperature 98.1 °F (36.7 °C), resp. rate 30, height 5' 11\" (1.803 m), weight 107.2 kg (236 lb 6.4 oz), SpO2 97 %.   General: well developed, well nourished   HEENT: unremarkable   Heart: regular rhythm no mumur    Lungs: clear   Abdominal:  benign   Neurological: unremarkable   Extremities: no edema     Findings/Diagnosis: GERD  Plan of Care/Planned Procedure: EGD with conscious/deep sedation    Signed:  Aliyah Haynes MD 7/13/2021

## 2021-07-13 NOTE — ROUTINE PROCESS
Kiley Diaz  1934  [id number]    Situation:  Verbal report received from: [facility]  Procedure: Procedure(s):  ESOPHAGOGASTRODUODENOSCOPY (EGD)    Background:    Preoperative diagnosis: REFLUX  Postoperative diagnosis: Reflux esophagitis, gastritis, hiatal hernia    :  Dr. Pierre Keenan  Assistant(s): Endoscopy Technician-1: Barb Jean  Endoscopy RN-1: Antoinette Rosales    Specimens: * No specimens in log *  H. Pylori  no    Assessment:  Intra-procedure medications     Anesthesia gave intra-procedure sedation and medications, see anesthesia flow sheet yes    Intravenous fluids: NS@ KVO     Vital signs stable yes    Abdominal assessment: round and soft yes    Recommendation:      Permission to share finding with family or friend yes

## 2021-08-02 NOTE — TELEPHONE ENCOUNTER
----- Message from Ayaka Sotelo sent at 9/25/2018 12:47 PM EDT -----  Regarding: Dr. Aura Dwyer  Pt's wife stated pt has contacted [place] [address] at  [phone] to order diabetic supplies and was advised to call and let the doctor know, and  [employer] will be contacting the doctor. Best contact number [phone] or [phone].
9/25/2018  1:17 PM          Please see message below from answering service.           Thanks
Called and spoke to Mr Meggan Jones to make him aware that we have not yet received his refill order for his diabetic supplies from [address] [place] [address]; but once the request is received, we will fax the forms back once they've been completed by the physician.
Arata from [facility]

## 2021-08-09 DIAGNOSIS — I10 ESSENTIAL HYPERTENSION: ICD-10-CM

## 2021-08-09 DIAGNOSIS — E11.9 TYPE 2 DIABETES MELLITUS WITHOUT COMPLICATION, WITH LONG-TERM CURRENT USE OF INSULIN (HCC): ICD-10-CM

## 2021-08-09 DIAGNOSIS — Z79.4 TYPE 2 DIABETES MELLITUS WITHOUT COMPLICATION, WITH LONG-TERM CURRENT USE OF INSULIN (HCC): ICD-10-CM

## 2021-08-09 NOTE — TELEPHONE ENCOUNTER
----- Message from Nessa Acevedo sent at 8/9/2021 10:22 AM EDT -----  Regarding: Dr. Lizette Chaparro Message/Vendor Calls    Caller's first and last name: wife      Reason for call:      Callback required yes/no and why:      Best contact number(s):      Details to clarify the request:      Nessa Acevedo

## 2021-08-10 RX ORDER — TELMISARTAN 80 MG/1
TABLET ORAL
Qty: 14 TABLET | Refills: 0 | Status: SHIPPED | OUTPATIENT
Start: 2021-08-10 | End: 2021-10-04 | Stop reason: SDUPTHER

## 2021-09-08 DIAGNOSIS — Z79.4 TYPE 2 DIABETES MELLITUS WITHOUT COMPLICATION, WITH LONG-TERM CURRENT USE OF INSULIN (HCC): ICD-10-CM

## 2021-09-08 DIAGNOSIS — E11.9 TYPE 2 DIABETES MELLITUS WITHOUT COMPLICATION, WITH LONG-TERM CURRENT USE OF INSULIN (HCC): ICD-10-CM

## 2021-09-09 RX ORDER — PEN NEEDLE, DIABETIC 31 GX3/16"
NEEDLE, DISPOSABLE MISCELLANEOUS
Qty: 180 PEN NEEDLE | Refills: 3 | Status: SHIPPED | OUTPATIENT
Start: 2021-09-09

## 2021-10-04 ENCOUNTER — OFFICE VISIT (OUTPATIENT)
Dept: FAMILY MEDICINE CLINIC | Age: 86
End: 2021-10-04
Payer: MEDICARE

## 2021-10-04 VITALS
BODY MASS INDEX: 32.11 KG/M2 | HEIGHT: 71 IN | WEIGHT: 229.4 LBS | OXYGEN SATURATION: 96 % | RESPIRATION RATE: 24 BRPM | SYSTOLIC BLOOD PRESSURE: 105 MMHG | TEMPERATURE: 97.8 F | HEART RATE: 95 BPM | DIASTOLIC BLOOD PRESSURE: 62 MMHG

## 2021-10-04 DIAGNOSIS — D68.59 HYPERCOAGULABLE STATE (HCC): ICD-10-CM

## 2021-10-04 DIAGNOSIS — Z86.718 HISTORY OF VENOUS THROMBOEMBOLISM: ICD-10-CM

## 2021-10-04 DIAGNOSIS — Z79.4 TYPE 2 DIABETES MELLITUS WITHOUT COMPLICATION, WITH LONG-TERM CURRENT USE OF INSULIN (HCC): ICD-10-CM

## 2021-10-04 DIAGNOSIS — Z23 NEEDS FLU SHOT: ICD-10-CM

## 2021-10-04 DIAGNOSIS — E11.9 TYPE 2 DIABETES MELLITUS WITHOUT COMPLICATION, WITH LONG-TERM CURRENT USE OF INSULIN (HCC): ICD-10-CM

## 2021-10-04 DIAGNOSIS — I10 ESSENTIAL HYPERTENSION: ICD-10-CM

## 2021-10-04 DIAGNOSIS — N40.0 BENIGN PROSTATIC HYPERPLASIA, UNSPECIFIED WHETHER LOWER URINARY TRACT SYMPTOMS PRESENT: ICD-10-CM

## 2021-10-04 DIAGNOSIS — E11.21 TYPE 2 DIABETES WITH NEPHROPATHY (HCC): ICD-10-CM

## 2021-10-04 DIAGNOSIS — Z00.00 MEDICARE ANNUAL WELLNESS VISIT, SUBSEQUENT: Primary | ICD-10-CM

## 2021-10-04 DIAGNOSIS — J84.9 CHRONIC INTERSTITIAL LUNG DISEASE (HCC): ICD-10-CM

## 2021-10-04 DIAGNOSIS — Z71.89 ADVANCED DIRECTIVES, COUNSELING/DISCUSSION: ICD-10-CM

## 2021-10-04 DIAGNOSIS — R63.4 UNINTENTIONAL WEIGHT LOSS: ICD-10-CM

## 2021-10-04 LAB — HBA1C MFR BLD HPLC: 4.8 %

## 2021-10-04 PROCEDURE — 1101F PT FALLS ASSESS-DOCD LE1/YR: CPT | Performed by: FAMILY MEDICINE

## 2021-10-04 PROCEDURE — 83036 HEMOGLOBIN GLYCOSYLATED A1C: CPT | Performed by: FAMILY MEDICINE

## 2021-10-04 PROCEDURE — 90694 VACC AIIV4 NO PRSRV 0.5ML IM: CPT | Performed by: FAMILY MEDICINE

## 2021-10-04 PROCEDURE — G8536 NO DOC ELDER MAL SCRN: HCPCS | Performed by: FAMILY MEDICINE

## 2021-10-04 PROCEDURE — 99214 OFFICE O/P EST MOD 30 MIN: CPT | Performed by: FAMILY MEDICINE

## 2021-10-04 PROCEDURE — G8510 SCR DEP NEG, NO PLAN REQD: HCPCS | Performed by: FAMILY MEDICINE

## 2021-10-04 PROCEDURE — G8417 CALC BMI ABV UP PARAM F/U: HCPCS | Performed by: FAMILY MEDICINE

## 2021-10-04 PROCEDURE — G0008 ADMIN INFLUENZA VIRUS VAC: HCPCS | Performed by: FAMILY MEDICINE

## 2021-10-04 PROCEDURE — G8427 DOCREV CUR MEDS BY ELIG CLIN: HCPCS | Performed by: FAMILY MEDICINE

## 2021-10-04 PROCEDURE — G0439 PPPS, SUBSEQ VISIT: HCPCS | Performed by: FAMILY MEDICINE

## 2021-10-04 RX ORDER — FINASTERIDE 5 MG/1
TABLET, FILM COATED ORAL
Qty: 90 TABLET | Refills: 3 | Status: SHIPPED | OUTPATIENT
Start: 2021-10-04

## 2021-10-04 RX ORDER — PROMETHAZINE HYDROCHLORIDE AND CODEINE PHOSPHATE 6.25; 1 MG/5ML; MG/5ML
SOLUTION ORAL
COMMUNITY
Start: 2021-09-01

## 2021-10-04 RX ORDER — SEMAGLUTIDE 1.34 MG/ML
0.5 INJECTION, SOLUTION SUBCUTANEOUS
Qty: 1 BOX | Refills: 2 | Status: SHIPPED | OUTPATIENT
Start: 2021-10-04

## 2021-10-04 RX ORDER — METOPROLOL SUCCINATE 25 MG/1
25 TABLET, EXTENDED RELEASE ORAL DAILY
Qty: 90 TABLET | Refills: 3 | Status: SHIPPED | OUTPATIENT
Start: 2021-10-04

## 2021-10-04 RX ORDER — INSULIN GLARGINE 100 [IU]/ML
45 INJECTION, SOLUTION SUBCUTANEOUS DAILY
Qty: 40.5 ML | Refills: 1 | Status: SHIPPED | OUTPATIENT
Start: 2021-10-04

## 2021-10-04 RX ORDER — SEMAGLUTIDE 1.34 MG/ML
0.5 INJECTION, SOLUTION SUBCUTANEOUS
COMMUNITY
Start: 2021-09-15 | End: 2021-10-04 | Stop reason: DRUGHIGH

## 2021-10-04 RX ORDER — METFORMIN HYDROCHLORIDE 1000 MG/1
TABLET ORAL
Qty: 180 TABLET | Refills: 3 | Status: SHIPPED | OUTPATIENT
Start: 2021-10-04

## 2021-10-04 RX ORDER — TELMISARTAN 80 MG/1
TABLET ORAL
Qty: 90 TABLET | Refills: 1 | Status: SHIPPED | OUTPATIENT
Start: 2021-10-04

## 2021-10-04 NOTE — PROGRESS NOTES
Chief Complaint   Patient presents with   Skye See Annual Wellness Visit     Medicare   1. Have you been to the ER, urgent care clinic since your last visit? Hospitalized since your last visit? No    2. Have you seen or consulted any other health care providers outside of the [address] [place] [address] since your last visit? Include any pap smears or colon screening. Yes Where: Pulmonary        He denies any symptoms , reactions or allergies that would exclude them from being immunized today. Risks and adverse reactions were discussed and the VIS was given to them. All questions were addressed. He was observed for 15  min post injection. There were no reactions observed.     Gabriella Crockett LPN

## 2021-10-04 NOTE — PROGRESS NOTES
Cecy Rice (: 1934) is a 80 y.o. male, established patient, here for evaluation of the following chief complaint(s): Annual Wellness Visit (Medicare)       ASSESSMENT/PLAN: Ongoing issues with ILD slowly progressing. Working with Dr. Pat Lerma. HTN controlled. DM controlled. BP stable. Weight sig dropping and most likely related to ILD, GI SE of Ofev, and use of GLP 1. Discussed working this up further and pt declines additional testing at this time. Would like to order additional labs soon so will f/u in 4 weeks. Below is the assessment and plan developed based on review of pertinent history, physical exam, labs, studies, and medications. 1. Medicare annual wellness visit, subsequent  2. Advanced directives, counseling/discussion  -     ADVANCE CARE PLANNING FIRST 30 MINS  3. Needs flu shot  -     FLU (FLUAD QUAD INFLUENZA VACCINE,QUAD,ADJUVANTED)  4. Chronic interstitial lung disease ([facility] [address]  5. Essential hypertension  -     telmisartan (MICARDIS) 80 mg tablet; Take 1 Tab by mouth daily. , Normal, Disp-90 Tablet, R-1  -     metoprolol succinate (TOPROL-XL) 25 mg XL tablet; Take 1 Tablet by mouth daily. , Normal, Disp-90 Tablet, R-3  6. Type 2 diabetes with nephropathy (HCC)  -     semaglutide (Ozempic) 0.25 mg or 0.5 mg/dose (2 mg/1.5 ml) subq pen; 0.5 mg by SubCUTAneous route every seven (7) days. , Normal, Disp-1 Box, R-2  -     metFORMIN (GLUCOPHAGE) 1,000 mg tablet; TAKE 1 TABLET TWICE A DAY, Normal, Disp-180 Tablet, R-3  -     AMB POC HEMOGLOBIN A1C  7. Type 2 diabetes mellitus without complication, with long-term current use of insulin (HCC)  -     semaglutide (Ozempic) 0.25 mg or 0.5 mg/dose (2 mg/1.5 ml) subq pen; 0.5 mg by SubCUTAneous route every seven (7) days. , Normal, Disp-1 Box, R-2  -     telmisartan (MICARDIS) 80 mg tablet; Take 1 Tab by mouth daily. , Normal, Disp-90 Tablet, R-1  -     metFORMIN (GLUCOPHAGE) 1,000 mg tablet; TAKE 1 TABLET TWICE A DAY, Normal, Disp-180 Tablet, R-3  -     dapagliflozin (Farxiga) 5 mg tab tablet; Take 1 Tablet by mouth daily. , Normal, Disp-90 Tablet, R-3  -     insulin glargine (Lantus Solostar U-100 Insulin) 100 unit/mL (3 mL) inpn; 45 Units by SubCUTAneous route daily. , Normal, Disp-40.5 mL, R-1  -     AMB POC HEMOGLOBIN A1C  8. Benign prostatic hyperplasia, unspecified whether lower urinary tract symptoms present  -     finasteride (PROSCAR) 5 mg tablet; TAKE ONE TABLET DAILY, Normal, Disp-90 Tablet, R-3  9. Hypercoagulable state ([facility] [address]  -     rivaroxaban (XARELTO) 20 mg tab tablet; Take 1 Tablet by mouth daily. , Normal, Disp-90 Tablet, R-3  10. History of venous thromboembolism  -     rivaroxaban (XARELTO) 20 mg tab tablet; Take 1 Tablet by mouth daily. , Normal, Disp-90 Tablet, R-3  11. Unintentional weight loss      Return in about 4 weeks (around 11/1/2021), or if symptoms worsen or fail to improve. SUBJECTIVE/OBJECTIVE:  Vaccinated with Sujit Carrasquillo for [employer]. Chronic ILD - Working with Dr. Starla Ledesma and started new medication but unable to adriano SEs. Sx seem to be progressing. Using O2 mostly at night. CT Chest 3/25/2021:  \"IMPRESSION  1. Persistent bilateral fibrotic abnormality involving all lobes. This has progressed in the interval. The progression is best visualized laterally in each upper lobe right greater than left with increasing areas of bronchiolectasis and honeycombing. There is also regression seen in the periphery of the lower lobes  with increased reticulation. There is probably still air trapping present though this cannot be evaluated on today's examination as described above. These findings could be related to chronic hypersensitivity pneumonitis. Exact etiology is uncertain\"    GERD - ct to struggle with sx. Dealing with belching, regurgitation. Worse with coughing from ILD. Has had a long history of acid reflux and has been on Prevacid for a while.   Has had issues where he has needed to be on additional agents which have helped. Had no significant improvement with changes to Ozempic and Metformin in the past.  Recent  EGD with Dr. Pastor Soto    Diabetes still well controlled with last A1c down to 4.8% today. ROS  Gen - no fever/chills  Resp -per hpi  CV - per hpi  Rest per HPI    Blood pressure 105/62, pulse 95, temperature 97.8 °F (36.6 °C), temperature source Oral, resp. rate 24, height 5' 11\" (1.803 m), weight 229 lb 6.4 oz (104.1 kg), SpO2 96 %. Physical Exam  General appearance - alert, well appearing, and in no distress  Eyes -sclera anicteric  Neck - supple, no significant adenopathy, no thyromegaly  Chest - clear to auscultation, no wheezes, rales or rhonchi, symmetric air entry  Heart - normal rate, regular rhythm, normal S1, S2, no murmurs, rubs, clicks or gallops  Neurological - alert, oriented, normal speech, no focal findings or movement disorder noted  Extr - no edema  Psych - normal mood and affect    On this date 10/04/2021 I have spent 30 minutes reviewing previous notes, test results and face to face with the patient discussing the diagnosis and importance of compliance with the treatment plan as well as documenting on the day of the visit. An electronic signature was used to authenticate this note. -- Mukesh Batista MD     This is the Subsequent Medicare Annual Wellness Exam, performed 12 months or more after the Initial AWV or the last Subsequent AWV    I have reviewed the patient's medical history in detail and updated the computerized patient record. Assessment/Plan   Education and counseling provided:  Are appropriate based on today's review and evaluation    1. Medicare annual wellness visit, subsequent  2. Advanced directives, counseling/discussion  -     ADVANCE CARE PLANNING FIRST 30 MINS  3. Needs flu shot  -     FLU (FLUAD QUAD INFLUENZA VACCINE,QUAD,ADJUVANTED)  4. Chronic interstitial lung disease ([address]  5. Essential hypertension  -     telmisartan (MICARDIS) 80 mg tablet;  Take 1 Tab by mouth daily. , Normal, Disp-90 Tablet, R-1  -     metoprolol succinate (TOPROL-XL) 25 mg XL tablet; Take 1 Tablet by mouth daily. , Normal, Disp-90 Tablet, R-3  6. Type 2 diabetes with nephropathy (HCC)  -     semaglutide (Ozempic) 0.25 mg or 0.5 mg/dose (2 mg/1.5 ml) subq pen; 0.5 mg by SubCUTAneous route every seven (7) days. , Normal, Disp-1 Box, R-2  -     metFORMIN (GLUCOPHAGE) 1,000 mg tablet; TAKE 1 TABLET TWICE A DAY, Normal, Disp-180 Tablet, R-3  -     AMB POC HEMOGLOBIN A1C  7. Type 2 diabetes mellitus without complication, with long-term current use of insulin (HCC)  -     semaglutide (Ozempic) 0.25 mg or 0.5 mg/dose (2 mg/1.5 ml) subq pen; 0.5 mg by SubCUTAneous route every seven (7) days. , Normal, Disp-1 Box, R-2  -     telmisartan (MICARDIS) 80 mg tablet; Take 1 Tab by mouth daily. , Normal, Disp-90 Tablet, R-1  -     metFORMIN (GLUCOPHAGE) 1,000 mg tablet; TAKE 1 TABLET TWICE A DAY, Normal, Disp-180 Tablet, R-3  -     dapagliflozin (Farxiga) 5 mg tab tablet; Take 1 Tablet by mouth daily. , Normal, Disp-90 Tablet, R-3  -     insulin glargine (Lantus Solostar U-100 Insulin) 100 unit/mL (3 mL) inpn; 45 Units by SubCUTAneous route daily. , Normal, Disp-40.5 mL, R-1  -     AMB POC HEMOGLOBIN A1C  8. Benign prostatic hyperplasia, unspecified whether lower urinary tract symptoms present  -     finasteride (PROSCAR) 5 mg tablet; TAKE ONE TABLET DAILY, Normal, Disp-90 Tablet, R-3  9. Hypercoagulable state ([address]  -     rivaroxaban (XARELTO) 20 mg tab tablet; Take 1 Tablet by mouth daily. , Normal, Disp-90 Tablet, R-3  10. History of venous thromboembolism  -     rivaroxaban (XARELTO) 20 mg tab tablet; Take 1 Tablet by mouth daily. , Normal, Disp-90 Tablet, R-3  11.  Unintentional weight loss       Depression Risk Factor Screening     3 most recent PHQ Screens 10/4/2021   Little interest or pleasure in doing things Not at all   Feeling down, depressed, irritable, or hopeless Not at all   Total Score PHQ 2 0 Alcohol Risk Screen    Do you average more than 1 drink per night or more than 7 drinks a week: No    In the past three months have you have had more than 4 drinks containing alcohol on one occasion: No        Functional Ability and Level of Safety    Hearing: Hearing is good. Activities of Daily Living: The home contains: no safety equipment. Patient does total self care      Ambulation: with no difficulty     Fall Risk:  Fall Risk Assessment, last 12 mths 10/4/2021   Able to walk? Yes   Fall in past 12 months? 0   Do you feel unsteady? 0   Are you worried about falling 0   Number of falls in past 12 months -   Fall with injury?  -      Abuse Screen:  Patient is not abused       Cognitive Screening    Has your family/caregiver stated any concerns about your memory: no     Cognitive Screening: Normal - Verbal Fluency Test    Health Maintenance Due     Health Maintenance Due   Topic Date Due    Shingrix Vaccine Age 49> (1 of 2) Never done    Pneumococcal 65+ years (1 of 1 - PPSV23) Never done    Foot Exam Q1  08/28/2020    COVID-19 Vaccine (3 - Pfizer booster) 10/13/2021       Patient Care Team   Patient Care Team:  Ofelia Cabrera MD as PCP - General (Family Medicine)  Ofelia Cabrera MD as PCP - [address] [place] [address] [facility]  Traci Chaney MD as Consulting Provider (Endocrinology)  Brayden Machado MD (Orthopedic Surgery)  Sheryl Byrnes MD (Otolaryngology)    History     Patient Active Problem List   Diagnosis Code    Diabetes mellitus, type II ([facility] [address] E11.9    Gastroesophageal reflux disease K21.9    History of venous thromboembolism Z86.718    Encounter for monitoring Coumadin therapy Z51.81, Z79.01    Essential hypertension I10    Obesity, morbid ([facility] [address] E66.01    Type 2 diabetes with nephropathy ([facility] [address] E11.21    NSTEMI (non-ST elevated myocardial infarction) ([facility] [address] I21.4     Past Medical History:   Diagnosis Date    Arthritis     Back injury 1994    CAD (coronary artery disease) Stent replacement    Diabetes ([facility] [address]     GERD (gastroesophageal reflux disease)     Hypertension     Muscle cramps     PUD (peptic ulcer disease)     Reflux     Thromboembolus (HCC)     L BLE      Past Surgical History:   Procedure Laterality Date    HX CORONARY STENT PLACEMENT      x 2    HX ORTHOPAEDIC Left     Crushed/left index finger amputee    HX ORTHOPAEDIC      heal spur removed left foot    HX OTHER SURGICAL      hand surgery, heal spur L    UPPER GI ENDOSCOPY,BIOPSY  5/16/2018         UPPER GI ENDOSCOPY,DIAGNOSIS  7/13/2021         UPPER GI ENDOSCOPY,DILATN W GUIDE  5/16/2018          Current Outpatient Medications   Medication Sig Dispense Refill    promethazine-codeine (PHENERGAN with CODEINE) 6.25-10 mg/5 mL syrup TAKE 5 ML BY MOUTH THREE TIMES DAILY AS NEEDED . THIS IS A 30 DAYS SUPPLY      semaglutide (Ozempic) 0.25 mg or 0.5 mg/dose (2 mg/1.5 ml) subq pen 0.5 mg by SubCUTAneous route every seven (7) days. 1 Box 2    telmisartan (MICARDIS) 80 mg tablet Take 1 Tab by mouth daily. 90 Tablet 1    metFORMIN (GLUCOPHAGE) 1,000 mg tablet TAKE 1 TABLET TWICE A  Tablet 3    finasteride (PROSCAR) 5 mg tablet TAKE ONE TABLET DAILY 90 Tablet 3    metoprolol succinate (TOPROL-XL) 25 mg XL tablet Take 1 Tablet by mouth daily. 90 Tablet 3    dapagliflozin (Farxiga) 5 mg tab tablet Take 1 Tablet by mouth daily. 90 Tablet 3    rivaroxaban (XARELTO) 20 mg tab tablet Take 1 Tablet by mouth daily. 90 Tablet 3    insulin glargine (Lantus Solostar U-100 Insulin) 100 unit/mL (3 mL) inpn 45 Units by SubCUTAneous route daily.  40.5 mL 1    Insulin Needles, Disposable, (Linda Pen Needle) 32 gauge x 5/32\" ndle USE 1 NEEDLE UNDER THE SKIN DAILY 180 Pen Needle 3    benzonatate (TESSALON) 200 mg capsule TAKE 1 CAPSULE BY MOUTH THREE TIMES DAILY      ProAir HFA 90 mcg/actuation inhaler       lansoprazole (PREVACID) 30 mg capsule TAKE 1 CAPSULE DAILY BEFORE BREAKFAST (Patient taking differently: two (2) times a day.) 90 Cap 3    SYMBICORT 160-4.5 mcg/actuation HFAA Take 2 Puffs by inhalation two (2) times a day.  aspirin 81 mg chewable tablet Take 1 Tab by mouth daily. 90 Tab 1    magnesium oxide (MAG-OX) 400 mg tablet Take 800 mg by mouth two (2) times a day.  diclofenac (VOLTAREN) 1 % gel Apply  to affected area four (4) times daily. 100 g 5    cholecalciferol, VITAMIN D3, (VITAMIN D3) 5,000 unit tab tablet Take 1,000 Units by mouth daily. Nature Made       cyanocobalamin (VITAMIN B-12) 1,000 mcg tablet Take 1,000 mcg by mouth daily. Nature Made       cyclobenzaprine (FLEXERIL) 10 mg tablet Take 10 mg by mouth three (3) times daily as needed for Muscle Spasm(s).  nitroglycerin (NITROSTAT) 0.4 mg SL tablet place 1 tablet under the tongue AT FIRST SIGN OF ATTACK, MAY REPE. ..  (REFER TO PRESCRIPTION NOTES). (Patient not taking: Reported on 2021)  1     Allergies   Allergen Reactions    Ofev [Nintedanib] Diarrhea and Nausea and Vomiting    Statins-Hmg-Coa Reductase Inhibitors Myalgia     Tried several statins.         Family History   Problem Relation Age of Onset    Heart Disease Brother     Heart Disease Mother     Bleeding Prob Father     Tuberculosis Sister      Social History     Tobacco Use    Smoking status: Former Smoker     Packs/day: 1.00     Quit date: 1975     Years since quittin.8    Smokeless tobacco: Never Used   Substance Use Topics    Alcohol use: Not Currently         Jess Juarez MD

## 2021-10-04 NOTE — PATIENT INSTRUCTIONS
Vaccine Information Statement    Influenza (Flu) Vaccine (Inactivated or Recombinant): What You Need to Know    Many vaccine information statements are available in French and other languages. See www.immunize.org/vis. Hojas de información sobre vacunas están disponibles en español y en muchos otros idiomas. Visite www.immunize.org/vis. 1. Why get vaccinated? Influenza vaccine can prevent influenza (flu). Flu is a contagious disease that spreads around the [facility] every year, usually between October and May. Anyone can get the flu, but it is more dangerous for some people. Infants and young children, people 72 years and older, pregnant people, and people with certain health conditions or a weakened immune system are at greatest risk of flu complications. Pneumonia, bronchitis, sinus infections, and ear infections are examples of flu-related complications. If you have a medical condition, such as heart disease, cancer, or diabetes, flu can make it worse. Flu can cause fever and chills, sore throat, muscle aches, fatigue, cough, headache, and runny or stuffy nose. Some people may have vomiting and diarrhea, though this is more common in children than adults. In an average year, thousands of people in the [facility] die from flu, and many more are hospitalized. Flu vaccine prevents millions of illnesses and flu-related visits to the doctor each year. 2. Influenza vaccines     CDC recommends everyone 6 months and older get vaccinated every flu season. Children 6 months through 6years of age may need 2 doses during a single flu season. Everyone else needs only 1 dose each flu season. It takes about 2 weeks for protection to develop after vaccination. There are many flu viruses, and they are always changing. Each year a new flu vaccine is made to protect against the influenza viruses believed to be likely to cause disease in the upcoming flu season.  Even when the vaccine doesnt exactly match these viruses, it may still provide some protection. Influenza vaccine does not cause flu. Influenza vaccine may be given at the same time as other vaccines. 3. Talk with your health care provider    Tell your vaccination provider if the person getting the vaccine:   Has had an allergic reaction after a previous dose of influenza vaccine, or has any severe, life-threatening allergies    Has ever had Guillain-Barré Syndrome (also called GBS)    In some cases, your health care provider may decide to postpone influenza vaccination until a future visit. Influenza vaccine can be administered at any time during pregnancy. People who are or will be pregnant during influenza season should receive inactivated influenza vaccine. People with minor illnesses, such as a cold, may be vaccinated. People who are moderately or severely ill should usually wait until they recover before getting influenza vaccine. Your health care provider can give you more information. 4. Risks of a vaccine reaction     Soreness, redness, and swelling where the shot is given, fever, muscle aches, and headache can happen after influenza vaccination.  There may be a very small increased risk of Guillain-Barré Syndrome (GBS) after inactivated influenza vaccine (the flu shot). Carson Carey children who get the flu shot along with pneumococcal vaccine (PCV13) and/or DTaP vaccine at the same time might be slightly more likely to have a seizure caused by fever. Tell your health care provider if a child who is getting flu vaccine has ever had a seizure. People sometimes faint after medical procedures, including vaccination. Tell your provider if you feel dizzy or have vision changes or ringing in the ears. As with any medicine, there is a very remote chance of a vaccine causing a severe allergic reaction, other serious injury, or death. 5. What if there is a serious problem?     An allergic reaction could occur after the vaccinated person leaves the clinic. If you see signs of a severe allergic reaction (hives, swelling of the face and throat, difficulty breathing, a fast heartbeat, dizziness, or weakness), call 9-1-1 and get the person to the nearest hospital.    For other signs that concern you, call your health care provider. Adverse reactions should be reported to the Vaccine Adverse Event Reporting System (VAERS). Your health care provider will usually file this report, or you can do it yourself. Visit the VAERS website at www.vaers. [facility].gov or call 9-221.931.9722. VAERS is only for reporting reactions, and VAERS staff members do not give medical advice. 6. The National Vaccine Injury Compensation Program    The [facility] Vaccine Injury Compensation Program (VICP) is a federal program that was created to compensate people who may have been injured by certain vaccines. Claims regarding alleged injury or death due to vaccination have a time limit for filing, which may be as short as two years. Visit the VICP website at www.[facility].gov/vaccinecompensation or call 5-782.267.9730 to learn about the program and about filing a claim. 7. How can I learn more?  Ask your health care provider.  Call your local or state health department.  Visit the website of the Food and Drug Administration (FDA) for vaccine package inserts and additional information at www.fda.gov/vaccines-blood-biologics/vaccines.  Contact the Centers for Disease Control and Prevention (CDC):  - Call 4-787.841.9628 (1-800-CDC-INFO) or  - Visit CDCs influenza website at www.cdc.gov/flu. Vaccine Information Statement   Inactivated Influenza Vaccine   8/6/2021  [address] [id number]   Department of Health and Human Services  Centers for Disease Control and Prevention    Office Use Only

## 2021-10-04 NOTE — ACP (ADVANCE CARE PLANNING)
Advance Care Planning     Advance Care Planning (ACP) Physician/NP/PA Conversation      Date of Conversation: 10/4/2021  Conducted with: Patient with Decision Making Capacity    Healthcare Decision Maker:   No healthcare decision makers have been documented. Click here to complete [address] including selection of the Healthcare Decision Maker Relationship (ie \"Primary\")      Today we documented Decision Maker(s) consistent with Legal Next of Kin hierarchy. Primary:  Caity Paiz - wife    Primary Phone:    Home Phone: [phone]       Secondary  Abilio Gilliland  (Daughter who lives in [place]) - not sure of phone - pt to update  Jace Morales  (Daughter who lives in [place]) - not sure of phone - pt to update      Care Preferences:    Hospitalization: \"If your health worsens and it becomes clear that your chance of recovery is unlikely, what would be your preference regarding hospitalization? \"  The patient would prefer comfort-focused treatment without hospitalization. - specifically, pt would prefer to die at home when the time comes    Ventilation: \"If you were unable to breathe on your own and your chance of recovery was unlikely, what would be your preference about the use of a ventilator (breathing machine) if it was available to you? \"   The patient would NOT desire the use of a ventilator. Resuscitation: \"In the event your heart stopped as a result of an underlying serious health condition, would you want attempts to be made to restart your heart, or would you prefer a natural death? \"   No, do NOT attempt to resuscitate.     Additional topics discussed: treatment goals, ventilation preferences, hospitalization preferences and resuscitation preferences    Conversation Outcomes / Follow-Up Plan:   ACP complete - no further action today  Reviewed DNR/DNI and patient elects Full Code (Attempt Resuscitation)     Length of Voluntary ACP Conversation in minutes:  16 minutes    Zohra Cordero MD

## 2021-11-01 ENCOUNTER — VIRTUAL VISIT (OUTPATIENT)
Dept: FAMILY MEDICINE CLINIC | Age: 86
End: 2021-11-01
Payer: MEDICARE

## 2021-11-01 DIAGNOSIS — J84.9 CHRONIC INTERSTITIAL LUNG DISEASE (HCC): Primary | ICD-10-CM

## 2021-11-01 DIAGNOSIS — E11.21 TYPE 2 DIABETES WITH NEPHROPATHY (HCC): ICD-10-CM

## 2021-11-01 PROCEDURE — G8427 DOCREV CUR MEDS BY ELIG CLIN: HCPCS | Performed by: FAMILY MEDICINE

## 2021-11-01 PROCEDURE — G8510 SCR DEP NEG, NO PLAN REQD: HCPCS | Performed by: FAMILY MEDICINE

## 2021-11-01 PROCEDURE — 1101F PT FALLS ASSESS-DOCD LE1/YR: CPT | Performed by: FAMILY MEDICINE

## 2021-11-01 PROCEDURE — 99213 OFFICE O/P EST LOW 20 MIN: CPT | Performed by: FAMILY MEDICINE

## 2021-11-01 NOTE — PROGRESS NOTES
Chief Complaint   Patient presents with    Follow-up     4week   1. Have you been to the ER, urgent care clinic since your last visit? Hospitalized since your last visit? No    2. Have you seen or consulted any other health care providers outside of the [address] [place] [address] since your last visit? Include any pap smears or colon screening.  No

## 2021-11-01 NOTE — PROGRESS NOTES
Ruchi Mccormick (: 1934) is a 80 y.o. male, established patient, here for evaluation of the following chief complaint(s):  Follow-up (4week) and Annual Wellness Visit       ASSESSMENT/PLAN: To keep working with Dr. Mirian Mckeon on ILD. Encouraged oxygen. DM well controlled. N/V better off ozempic. Below is the assessment and plan developed based on review of pertinent history, physical exam, labs, studies, and medications. 1. Chronic interstitial lung disease ([facility] [address]  2. Type 2 diabetes with nephropathy ([facility] [address]      Return in about 6 months (around 2022), or if symptoms worsen or fail to improve. SUBJECTIVE/OBJECTIVE:  Had COVID vaccine and will get booster soon. Had PNA vaccine. Getting worse with ILD and dyspnea with coughing spells. To see Dr. Mirian Mckeon soon. ROS  Gen - no fever/chills  Resp - per HPI  CV - no chest pain  Rest per HPI    There were no vitals taken for this visit. Physical Exam  General appearance - alert, well appearing, and in no distress  Eyes -sclera anicteric  Neck - supple, no significant adenopathy, no thyromegaly  Chest - clear to auscultation, no wheezes, rales or rhonchi, symmetric air entry  Heart - normal rate, regular rhythm, normal S1, S2, no murmurs, rubs, clicks or gallops  Neurological - alert, oriented, normal speech, no focal findings or movement disorder noted  Extr - no edema  Psych - normal mood and affect    On this date 2021 I have spent 20 minutes reviewing previous notes, test results and face to face with the patient discussing the diagnosis and importance of compliance with the treatment plan as well as documenting on the day of the visit. An electronic signature was used to authenticate this note.   -- Rani Waterman MD

## 2022-01-26 ENCOUNTER — TELEPHONE (OUTPATIENT)
Dept: FAMILY MEDICINE CLINIC | Age: 87
End: 2022-01-26

## 2022-01-26 NOTE — TELEPHONE ENCOUNTER
Pls call Tieshateri Barbosaan, wife     Pls call in ref to Hospice   Dr Amanda Valentine suggested it to them       Best number to reach her is [phone]

## 2022-01-28 NOTE — TELEPHONE ENCOUNTER
Spoke with wife and caregiver regarding increasing oxygen concentrator and explained what Hospice Care was. She would like to not do Hospice at this time and will discuss with Dr Shadi Hernandes office regarding getting test for increase in oxygen concentrator. Explained that to get increase patient would have to have office visit to do test for order.

## 2022-02-07 ENCOUNTER — HOSPICE ADMISSION (OUTPATIENT)
Dept: HOSPICE | Facility: HOSPICE | Age: 87
End: 2022-02-07

## 2022-03-18 PROBLEM — K21.9 GASTROESOPHAGEAL REFLUX DISEASE: Status: ACTIVE | Noted: 2017-03-20

## 2022-03-19 PROBLEM — E66.01 OBESITY, MORBID (HCC): Status: ACTIVE | Noted: 2018-04-18

## 2022-03-19 PROBLEM — Z86.718 HISTORY OF VENOUS THROMBOEMBOLISM: Status: ACTIVE | Noted: 2017-03-20

## 2022-03-19 PROBLEM — Z51.81 ENCOUNTER FOR MONITORING COUMADIN THERAPY: Status: ACTIVE | Noted: 2017-03-20

## 2022-03-19 PROBLEM — E11.21 TYPE 2 DIABETES WITH NEPHROPATHY (HCC): Status: ACTIVE | Noted: 2018-05-23

## 2022-03-19 PROBLEM — Z79.01 ENCOUNTER FOR MONITORING COUMADIN THERAPY: Status: ACTIVE | Noted: 2017-03-20

## 2022-03-19 PROBLEM — I10 ESSENTIAL HYPERTENSION: Status: ACTIVE | Noted: 2017-07-12

## 2022-03-20 PROBLEM — I21.4 NSTEMI (NON-ST ELEVATED MYOCARDIAL INFARCTION) (HCC): Status: ACTIVE | Noted: 2018-08-16

## 2022-12-08 NOTE — PROGRESS NOTES
Chief Complaint   Patient presents with    Diabetes     3 month follow-up   1. Have you been to the ER, urgent care clinic since your last visit? Hospitalized since your last visit? No    2. Have you seen or consulted any other health care providers outside of the [address] [place] [address] since your last visit? Include any pap smears or colon screening.  Yes Where: [facility]   Discuss reducing Warfarin due to bruising  Room 8 Quality 226: Preventive Care And Screening: Tobacco Use: Screening And Cessation Intervention: Patient screened for tobacco use and is an ex/non-smoker Quality 110: Preventive Care And Screening: Influenza Immunization: Influenza Immunization previously received during influenza season Additional Notes: Has received the Covid-19 vaccine. Detail Level: Detailed Quality 431: Preventive Care And Screening: Unhealthy Alcohol Use - Screening: Patient screened for unhealthy alcohol use using a single question and scores less than 2 times per year Quality 111:Pneumonia Vaccination Status For Older Adults: Pneumococcal Vaccination not Administered or Previously Received, Reason not Otherwise Specified Quality 130: Documentation Of Current Medications In The Medical Record: Current Medications Documented Quality 431: Preventive Care And Screening: Unhealthy Alcohol Use - Screening: Patient not identified as an unhealthy alcohol user when screened for unhealthy alcohol use using a systematic screening method

## 2024-12-06 NOTE — PROGRESS NOTES
Chief Complaint   Patient presents with   [facility] Follow Up     Discharged 6/16/18   1. Have you been to the ER, urgent care clinic since your last visit? Hospitalized since your last visit? Yes Where: [facility]    2. Have you seen or consulted any other health care providers outside of the [address] [place] [address] since your last visit? Include any pap smears or colon screening.  No   Room 9 Medication: Myrtle passed protocol.   Last office visit date: 8/22/24  Next appointment scheduled?: Yes   Number of refills given: QTY  180

## (undated) DEVICE — TOWEL 4 PLY TISS 19X30 SUE WHT

## (undated) DEVICE — Device

## (undated) DEVICE — MEDI-TRACE CADENCE ADULT, DEFIBRILLATION ELECTRODE -RTS  (10 PR/PK) - PHILIPS: Brand: MEDI-TRACE CADENCE

## (undated) DEVICE — SPLINT WR POS F/ARTERIAL ACC -- BX/10

## (undated) DEVICE — Z DISCONTINUED PER MEDLINE LINE GAS SAMPLING O2/CO2 LNG AD 13 FT NSL W/ TBNG FILTERLINE

## (undated) DEVICE — 3M™ TEGADERM™ TRANSPARENT FILM DRESSING FRAME STYLE, 1626W, 4 IN X 4-3/4 IN (10 CM X 12 CM), 50/CT 4CT/CASE: Brand: 3M™ TEGADERM™

## (undated) DEVICE — CATH IV AUTOGRD BC PNK 20GA 25 -- INSYTE

## (undated) DEVICE — NEEDLE HYPO 18GA L1.5IN PNK S STL HUB POLYPR SHLD REG BVL

## (undated) DEVICE — SET ADMIN 16ML TBNG L100IN 2 Y INJ SITE IV PIGGY BK DISP

## (undated) DEVICE — SOLIDIFIER MEDC 1200ML -- CONVERT TO 356117

## (undated) DEVICE — NEONATAL-ADULT SPO2 SENSOR: Brand: NELLCOR

## (undated) DEVICE — BASIN EMSIS 16OZ GRAPHITE PLAS KID SHP MOLD GRAD FOR ORAL

## (undated) DEVICE — Device: Brand: FIELDER XT

## (undated) DEVICE — ELECTRODE,RADIOTRANSLUCENT,FOAM,5PK: Brand: MEDLINE

## (undated) DEVICE — TUBING PRSS MON L6IN PVC M FEM CONN

## (undated) DEVICE — GLIDESHEATH SLENDER ACCESS KIT: Brand: GLIDESHEATH SLENDER

## (undated) DEVICE — PACK PROCEDURE SURG HRT CATH

## (undated) DEVICE — SYR 10ML LUER LOK 1/5ML GRAD --

## (undated) DEVICE — CATHETER PTCA EUPHORA L142CM BLLN L15MM DIA2MM 0.022IN 14ATM

## (undated) DEVICE — SYR 3ML LL TIP 1/10ML GRAD --

## (undated) DEVICE — BLOCK BITE ENDOSCP AD 21 MM W/ DIL BLU LF DISP

## (undated) DEVICE — SYR ASSEMB INFL BLLN 60ML --

## (undated) DEVICE — ROSEN CURVED WIRE GUIDE: Brand: ROSEN

## (undated) DEVICE — RUNTHROUGH NS EXTRA FLOPPY PTCA GUIDEWIRE: Brand: RUNTHROUGH

## (undated) DEVICE — 1200 GUARD II KIT W/5MM TUBE W/O VAC TUBE: Brand: GUARDIAN

## (undated) DEVICE — FORCEPS BX L160CM DIA8MM GRSP DISECT CUP TIP NONLOCKING ROT

## (undated) DEVICE — TR BAND RADIAL ARTERY COMPRESSION DEVICE: Brand: TR BAND

## (undated) DEVICE — CUSTOM KT PTCA INFL DEV K05 00053H

## (undated) DEVICE — ANGIOGRAPHY KIT CUST [K0910930B] [MERIT MEDICAL SYSTEMS INC]

## (undated) DEVICE — BAG SPEC BIOHZRD 10 X 10 IN --

## (undated) DEVICE — KENDALL RADIOLUCENT FOAM MONITORING ELECTRODE RECTANGULAR SHAPE: Brand: KENDALL

## (undated) DEVICE — CONTAINER SPEC 20 ML LID NEUT BUFF FORMALIN 10 % POLYPR STS

## (undated) DEVICE — YANKAUER,TAPERED BULBOUS TIP,W/O VENT: Brand: MEDLINE

## (undated) DEVICE — SYRINGE ANGIO 10 CC BRL STD PRNT POLYCARB LT BLU MEDALLION

## (undated) DEVICE — NC TREK CORONARY DILATATION CATHETER 2.5 MM X 15 MM / RAPID-EXCHANGE: Brand: NC TREK

## (undated) DEVICE — SYR POWER 150ML 8IN FILL TUBE --

## (undated) DEVICE — MEDI-VAC YANK SUCT HNDL W/TPRD BULBOUS TIP: Brand: CARDINAL HEALTH

## (undated) DEVICE — CATH GUID COR EB40 6FR 100CM -- LAUNCHER

## (undated) DEVICE — SOLIDIFIER FLD 2OZ 1500CC N DISINF IN BTL DISP SAFESORB

## (undated) DEVICE — CATHETER ANGIO JR4 0.054 INX6 FRX100 CM 1.9 CM PERFORMA